# Patient Record
Sex: FEMALE | Race: WHITE | NOT HISPANIC OR LATINO | Employment: UNEMPLOYED | URBAN - METROPOLITAN AREA
[De-identification: names, ages, dates, MRNs, and addresses within clinical notes are randomized per-mention and may not be internally consistent; named-entity substitution may affect disease eponyms.]

---

## 2018-01-15 ENCOUNTER — OFFICE VISIT (OUTPATIENT)
Dept: URGENT CARE | Facility: MEDICAL CENTER | Age: 28
End: 2018-01-15
Payer: COMMERCIAL

## 2018-01-15 PROCEDURE — 99203 OFFICE O/P NEW LOW 30 MIN: CPT

## 2018-01-15 PROCEDURE — 96372 THER/PROPH/DIAG INJ SC/IM: CPT

## 2018-01-16 NOTE — PROGRESS NOTES
Assessment   1  Tension-type headache, not intractable, unspecified chronicity pattern (339 10) (G44 209)    Plan   Tension-type headache, not intractable, unspecified chronicity pattern    · Cyclobenzaprine HCl - 10 MG Oral Tablet; TAKE 1 TABLET AT BEDTIME AS    NEEDED   · Ketorolac Tromethamine 30 MG/ML Injection Solution    Discussion/Summary   Discussion Summary: You were given a Toradol injection 60 mg IM in the office today Flexeril 1/2 to 1 tablet at bedtime as needed  versus heat to the neck area as needed  Mucinex over-the-counter for ear cold symptoms  with Tylenol versus Motrin for headache control as needed  up with your family doctor symptoms persist or worsen  Medication Side Effects Reviewed: Possible side effects of new medications were reviewed with the patient/guardian today  Understands and agrees with treatment plan: The treatment plan was reviewed with the patient/guardian  The patient/guardian understands and agrees with the treatment plan      Chief Complaint   1  Headache  Chief Complaint Free Text Note Form: c/o occipital pain/headache (occurred spontaneously) x 1 week  History of Present Illness   HPI: Patient presents today with complaints of a posterior occipital headache  She states symptom on for approximately a week  She has been using Aleve Advil Tylenol with only slight relief of her symptoms  She denies any head trauma  She denies any associated nausea or vomiting  She also has some slight cold symptoms with nasal congestion  She denies any fever or chills  She rates her pain as 6 to 7/10 on the pain scale  She denies any history of migraines  Denies any radiating pain  patient also complains of cold symptoms with slight nasal discharge  She denies any significant cough fever chills    Hospital Based Practices Required Assessment:      Pain Assessment      the patient states they have pain  The pain is located in the headache   The patient describes the pain as comes and goes  (on a scale of 0 to 10, the patient rates the pain at 8 )      Abuse And Domestic Violence Screen       Yes, the patient is safe at home  -- The patient states no one is hurting them  Depression And Suicide Screen  No, the patient has not had thoughts of hurting themself  No, the patient has not felt depressed in the past 7 days  Review of Systems   Focused-Female:      Constitutional: No fever, no chills, feels well, no tiredness, no recent weight gain or loss  ENT: nasal discharge  Cardiovascular: no complaints of slow or fast heart rate, no chest pain, no palpitations, no leg claudication or lower extremity edema  Respiratory: no complaints of shortness of breath, no wheezing, no dyspnea on exertion, no orthopnea or PND  Neurological: headache, but-- no numbness-- and-- no dizziness  Active Problems   1  Acute bronchitis (466 0) (J20 9)   2  Acute upper respiratory infection (465 9) (J06 9)   3  Breech presentation (652 20) (O32 1XX0)   4  Costochondritis (733 6) (M94 0)   5  Encounter for fetal anatomic survey (V28 81) (Z36 89)   6  Encounter for observation of suspected problem with fetal growth not found (V89 04)     (Z03 74)   7  Episodic tension-type headache (339 11) (G44 219)   8  Intrauterine growth restriction affecting antepartum care of mother (656 53) (O36 5990)   9  Pregnancy (V22 2) (Z34 90)   10  Pregnancy resulting from in vitro fertilization (V23 85) (O09 819)   11  Stye, right (373 11) (H00 013)    Past Medical History   1  History of acute sinusitis (V12 69) (Z87 09)   2  History of acute sinusitis (V12 69) (Z87 09)  Active Problems And Past Medical History Reviewed: The active problems and past medical history were reviewed and updated today  Family History   Mother    1  No pertinent family history  Father    2  No pertinent family history  Family History Reviewed: The family history was reviewed and updated today         Social History    · Never A Smoker  Social History Reviewed: The social history was reviewed and updated today  Surgical History   Surgical History Reviewed: The surgical history was reviewed and updated today  Current Meds   Medication List Reviewed: The medication list was reviewed and updated today  Allergies   1  No Known Drug Allergies  2  Seasonal    Vitals   Signs   Recorded: [id number] 02:36PM   Temperature: 99 4 F  Heart Rate: 98  Respiration: 18  Systolic: 145  Diastolic: 81  BP Cuff Size: Large  Height: 5 ft 8 in  Weight: 185 lb   BMI Calculated: 28 13  BSA Calculated: 1 98  O2 Saturation: 98  Pain Scale: 8    Physical Exam        Constitutional      General appearance: No acute distress, well appearing and well nourished  Eyes      Conjunctiva and lids: No swelling, erythema or discharge  Pupils and irises: Equal, round and reactive to light  Ears, Nose, Mouth, and Throat      External inspection of ears and nose: Normal        Otoscopic examination: Tympanic membranes translucent with normal light reflex  Canals patent without erythema  Nasal mucosa, septum, and turbinates: Abnormal  -- mild nasal congestion  Pulmonary      Respiratory effort: No increased work of breathing or signs of respiratory distress  Auscultation of lungs: Clear to auscultation  Cardiovascular      Palpation of heart: Normal PMI, no thrills  Auscultation of heart: Normal rate and rhythm, normal S1 and S2, without murmurs  Musculoskeletal      Inspection/palpation of joints, bones, and muscles: Abnormal  -- Tenderness along the posterior occipital muscles  Full range of motion of the cervical spine  No palpable deformity  Neurologic      Cranial nerves: Cranial nerves 2-12 intact  Reflexes: 2+ and symmetric         Signatures    Electronically signed by : Cosme Mcconnell DO; Bud 15 2018  3:09PM EST                       (Author)

## 2018-01-23 VITALS
HEIGHT: 68 IN | RESPIRATION RATE: 18 BRPM | OXYGEN SATURATION: 98 % | BODY MASS INDEX: 28.04 KG/M2 | SYSTOLIC BLOOD PRESSURE: 110 MMHG | TEMPERATURE: 99.4 F | DIASTOLIC BLOOD PRESSURE: 81 MMHG | HEART RATE: 98 BPM | WEIGHT: 185 LBS

## 2018-03-06 ENCOUNTER — OFFICE VISIT (OUTPATIENT)
Dept: FAMILY MEDICINE CLINIC | Facility: CLINIC | Age: 28
End: 2018-03-06
Payer: COMMERCIAL

## 2018-03-06 VITALS
DIASTOLIC BLOOD PRESSURE: 78 MMHG | BODY MASS INDEX: 29.19 KG/M2 | WEIGHT: 192 LBS | SYSTOLIC BLOOD PRESSURE: 122 MMHG | TEMPERATURE: 98.1 F

## 2018-03-06 DIAGNOSIS — L20.9 ATOPIC DERMATITIS, UNSPECIFIED TYPE: ICD-10-CM

## 2018-03-06 DIAGNOSIS — J01.00 ACUTE NON-RECURRENT MAXILLARY SINUSITIS: Primary | ICD-10-CM

## 2018-03-06 PROCEDURE — 99213 OFFICE O/P EST LOW 20 MIN: CPT | Performed by: FAMILY MEDICINE

## 2018-03-06 RX ORDER — AZITHROMYCIN 250 MG/1
TABLET, FILM COATED ORAL
Qty: 6 TABLET | Refills: 0 | Status: SHIPPED | OUTPATIENT
Start: 2018-03-06 | End: 2018-03-13

## 2018-03-06 RX ORDER — MOMETASONE FUROATE 1 MG/G
CREAM TOPICAL DAILY
Qty: 45 G | Refills: 0 | Status: SHIPPED | OUTPATIENT
Start: 2018-03-06 | End: 2018-07-10 | Stop reason: ALTCHOICE

## 2018-03-07 NOTE — PROGRESS NOTES
Assessment/Plan:  Side effect profile medication discussed  Recommend return to office for re-evaluation if no improvement or worsening symptoms  No problem-specific Assessment & Plan notes found for this encounter  Diagnoses and all orders for this visit:    Acute non-recurrent maxillary sinusitis  -     azithromycin (ZITHROMAX) 250 mg tablet; Take 2 tablets today then 1 tablet daily x 4 days    Atopic dermatitis, unspecified type  -     mometasone (ELOCON) 0 1 % cream; Apply topically daily          Subjective:      Patient ID: Joanna Garrett is a 32 y o  female  Patient with sinus pressure and congestion over the last 1-2 weeks  She has been using over-the-counter decongestants with limited improvement  Occasional dry cough  No fever sweats or chills  Denies any GI complaints  Sore Throat    Associated symptoms include coughing and ear pain  Cough   Associated symptoms include ear pain and a sore throat  Earache    Associated symptoms include coughing and a sore throat  The following portions of the patient's history were reviewed and updated as appropriate: allergies, current medications, past family history, past medical history, past social history, past surgical history and problem list     Review of Systems   Constitutional: Negative  HENT: Positive for ear pain and sore throat  Eyes: Negative  Respiratory: Positive for cough  Cardiovascular: Negative  Gastrointestinal: Negative  Endocrine: Negative  Genitourinary: Negative  Musculoskeletal: Negative  Skin: Negative  Allergic/Immunologic: Negative  Neurological: Negative  Hematological: Negative  Psychiatric/Behavioral: Negative  Objective:      /78   Temp 98 1 °F (36 7 °C)   Wt 87 1 kg (192 lb)   BMI 29 19 kg/m²          Physical Exam   Constitutional: She is oriented to person, place, and time  She appears well-developed and well-nourished     HENT:   Head: Normocephalic and atraumatic  Right Ear: External ear normal  Tympanic membrane is not erythematous and not bulging  Left Ear: External ear normal  Tympanic membrane is not erythematous and not bulging  Nose: Nose normal    Mouth/Throat: Oropharynx is clear and moist and mucous membranes are normal  No oral lesions  No oropharyngeal exudate  Eyes: Conjunctivae and EOM are normal  Right eye exhibits no discharge  Left eye exhibits no discharge  No scleral icterus  Neck: Normal range of motion  Neck supple  No thyromegaly present  Cardiovascular: Normal rate, regular rhythm and normal heart sounds  Exam reveals no gallop and no friction rub  No murmur heard  Pulmonary/Chest: Effort normal  No respiratory distress  She has no wheezes  She has no rales  She exhibits no tenderness  Abdominal: Soft  Bowel sounds are normal  She exhibits no distension and no mass  There is no tenderness  There is no rebound and no guarding  Musculoskeletal: Normal range of motion  She exhibits no edema, tenderness or deformity  Lymphadenopathy:     She has no cervical adenopathy  Neurological: She is alert and oriented to person, place, and time  She has normal reflexes  No cranial nerve deficit  She exhibits normal muscle tone  Coordination normal    Skin: Skin is warm and dry  No rash noted  No erythema  No pallor  Psychiatric: She has a normal mood and affect  Her behavior is normal    Vitals reviewed

## 2018-05-07 ENCOUNTER — OFFICE VISIT (OUTPATIENT)
Dept: FAMILY MEDICINE CLINIC | Facility: CLINIC | Age: 28
End: 2018-05-07
Payer: COMMERCIAL

## 2018-05-07 VITALS
SYSTOLIC BLOOD PRESSURE: 110 MMHG | DIASTOLIC BLOOD PRESSURE: 70 MMHG | TEMPERATURE: 98.4 F | HEIGHT: 69 IN | OXYGEN SATURATION: 98 % | HEART RATE: 107 BPM | BODY MASS INDEX: 27.99 KG/M2 | WEIGHT: 189 LBS

## 2018-05-07 DIAGNOSIS — S39.012A STRAIN OF LUMBAR REGION, INITIAL ENCOUNTER: Primary | ICD-10-CM

## 2018-05-07 PROCEDURE — 99213 OFFICE O/P EST LOW 20 MIN: CPT | Performed by: FAMILY MEDICINE

## 2018-05-07 RX ORDER — MELOXICAM 15 MG/1
15 TABLET ORAL DAILY
Qty: 30 TABLET | Refills: 0 | Status: SHIPPED | OUTPATIENT
Start: 2018-05-07 | End: 2018-07-10 | Stop reason: ALTCHOICE

## 2018-05-07 NOTE — PROGRESS NOTES
Assessment/Plan:  No significant findings on physical exam today  Recommend physical therapy  Side effect profile medication reviewed  Return to office for recheck in 1 year  Sooner if needed  No problem-specific Assessment & Plan notes found for this encounter  Diagnoses and all orders for this visit:    Strain of lumbar region, initial encounter  -     Ambulatory referral to Physical Therapy; Future  -     meloxicam (MOBIC) 15 mg tablet; Take 1 tablet (15 mg total) by mouth daily          Subjective:      Patient ID: Alton Rivera is a 32 y o  female  Patient with bilateral SI joint pain over the last 1-2 weeks  Denies any trauma  No radiculopathy symptoms  No bowel or bladder incontinence symptoms  No dysuria  The following portions of the patient's history were reviewed and updated as appropriate: allergies, current medications, past family history, past medical history, past social history, past surgical history and problem list     Review of Systems   Constitutional: Negative  HENT: Negative  Eyes: Negative  Respiratory: Negative  Cardiovascular: Negative  Gastrointestinal: Negative  Endocrine: Negative  Genitourinary: Negative  Musculoskeletal: Positive for back pain  Skin: Negative  Allergic/Immunologic: Negative  Neurological: Negative  Hematological: Negative  Psychiatric/Behavioral: Negative  Objective:      /70 (BP Location: Left arm, Patient Position: Sitting, Cuff Size: Adult)   Pulse (!) 107   Temp 98 4 °F (36 9 °C)   Ht 5' 8 5" (1 74 m)   Wt 85 7 kg (189 lb)   SpO2 98%   BMI 28 32 kg/m²          Physical Exam   Constitutional: She is oriented to person, place, and time  She appears well-developed and well-nourished  HENT:   Head: Normocephalic and atraumatic  Right Ear: External ear normal  Tympanic membrane is not erythematous and not bulging     Left Ear: External ear normal  Tympanic membrane is not erythematous and not bulging  Nose: Nose normal    Mouth/Throat: Oropharynx is clear and moist and mucous membranes are normal  No oral lesions  No oropharyngeal exudate  Eyes: Conjunctivae and EOM are normal  Right eye exhibits no discharge  Left eye exhibits no discharge  No scleral icterus  Neck: Normal range of motion  Neck supple  No thyromegaly present  Cardiovascular: Normal rate, regular rhythm and normal heart sounds  Exam reveals no gallop and no friction rub  No murmur heard  Pulmonary/Chest: Effort normal  No respiratory distress  She has no wheezes  She has no rales  She exhibits no tenderness  Abdominal: Soft  Bowel sounds are normal  She exhibits no distension and no mass  There is no tenderness  There is no rebound and no guarding  Musculoskeletal: Normal range of motion  She exhibits no edema, tenderness or deformity  Lymphadenopathy:     She has no cervical adenopathy  Neurological: She is alert and oriented to person, place, and time  She has normal reflexes  No cranial nerve deficit  She exhibits normal muscle tone  Coordination normal    Skin: Skin is warm and dry  No rash noted  No erythema  No pallor  Psychiatric: She has a normal mood and affect  Her behavior is normal    Vitals reviewed

## 2018-05-07 NOTE — PROGRESS NOTES
Assessment/Plan:    No problem-specific Assessment & Plan notes found for this encounter  {Assess/PlanSmartLinks:02402}      Subjective:      Patient ID: Manas Hull is a 32 y o  female      HPI    {Common ambulatory SmartLinks:56832}    Review of Systems      Objective:      /70 (BP Location: Left arm, Patient Position: Sitting, Cuff Size: Adult)   Pulse (!) 107   Temp 98 4 °F (36 9 °C)   Ht 5' 8 5" (1 74 m)   Wt 85 7 kg (189 lb)   SpO2 98%   BMI 28 32 kg/m²          Physical Exam

## 2018-05-14 ENCOUNTER — EVALUATION (OUTPATIENT)
Dept: PHYSICAL THERAPY | Facility: CLINIC | Age: 28
End: 2018-05-14
Payer: COMMERCIAL

## 2018-05-14 DIAGNOSIS — S39.012S LUMBAR STRAIN, SEQUELA: Primary | ICD-10-CM

## 2018-05-14 PROCEDURE — G8991 OTHER PT/OT GOAL STATUS: HCPCS | Performed by: PHYSICAL THERAPIST

## 2018-05-14 PROCEDURE — 97161 PT EVAL LOW COMPLEX 20 MIN: CPT | Performed by: PHYSICAL THERAPIST

## 2018-05-14 PROCEDURE — G8990 OTHER PT/OT CURRENT STATUS: HCPCS | Performed by: PHYSICAL THERAPIST

## 2018-05-14 NOTE — PROGRESS NOTES
PT Evaluation     Today's date: 2018  Patient name: Katy Emerson  : 1990  MRN: [id number]  Referring provider: Kevin Ley DO  Dx:   Encounter Diagnosis     ICD-10-CM    1  Lumbar strain, sequela S39 012S                   Assessment  Impairments: abnormal muscle firing, activity intolerance, impaired physical strength, lacks appropriate home exercise program and pain with function    Assessment details: Pt is a 32year old female presenting to PT with 3 month history of central lumbar pain  Pt presents with deficits in posture, bilateral hip strength, and core stabilization/strength  Pt with mild right rotation at L4-L5 and sacrum which appears to be contributing to inhibition of multifidus activation  Pt is currently limited in her ability to perform dynamic and functional activities at this time secondary to pain and weakness  Pt is a good candidate for skilled PT intervention and will benefit from treatment in order to address their limitations and to restore maximal function  Understanding of Dx/Px/POC: good   Prognosis: good    Goals  Short Term Goals: To be achieved by Discharge    1) FOTO equal to or greater than 71   2) Patient to be independent with comprehensive HEP  3) Decrease pain to 4/10 at worst  for improved quality of life  4) Increase strength to 5/5 MMT grade in all deficient planes to improve a/iadls  Plan  Patient would benefit from: skilled PT  Planned therapy interventions: therapeutic exercise, strengthening, neuromuscular re-education and manual therapy  Frequency: 1x week  Duration in visits: 4  Duration in weeks: 4  Treatment plan discussed with: patient        Subjective Evaluation    History of Present Illness  Onset date: several months  Mechanism of injury: Pt is a 32year old female presenting to PT with 3 month history of central lumbar pain  Pt denies an injury, trauma or preceding event   She states the only activity that has changed was that she moved into a new home in March and was lifting boxes and moving furniture  She noted gradual onset and increase in her pain beginning in March  Pt reports the pain is intermittent in nature  Pt denies numbness/tingling and bowel/bladder changes  She went to PCP last week for further evaluation - no imaging performed - pt prescribed Meloxicam, however, states she hasn't taken it yet  Pt referred to OPPT  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  Pt reports increased pain and/or difficulty with: bending, heavy lifting, rotating, after a strenuous day  Pt denies sleep disturbance secondary to pain  Pt reports decreased pain with: Advil, rest, changing position  Not a recurrent problem   Quality of life: good    Pain  Current pain ratin  At best pain ratin  At worst pain ratin  Quality: pressure, dull ache and burning      Diagnostic Tests  No diagnostic tests performed  Patient Goals  Patient goals for therapy: decreased pain, increased motion, return to sport/leisure activities, independence with ADLs/IADLs and increased strength          Objective     Postural Observations    Additional Postural Observation Details  Pt with mild forward head, rounded shoulders and increased lumbar lordosis  Tenderness     Lumbar Spine  Tenderness in the spinous process, facet joint, left transverse process and right transverse process       Additional Tenderness Details  TTP and mild right rotation of  L4-L5 and right sacrum region    Neurological Testing     Sensation     Lumbar   Left   Intact: light touch    Right   Intact: light touch    Active Range of Motion     Lumbar   Flexion: WFL and with pain  Extension: WFL and with pain  Left lateral flexion: WFL  Right lateral flexion: WFL  Left rotation: WFL  Right rotation: [facility]    Strength/Myotome Testing     Left Hip   Planes of Motion   Flexion: 4+  Extension: 4+  Abduction: 4+  Adduction: 4+  External rotation: 4  Internal rotation: 4    Right Hip   Planes of Motion Flexion: 4+  Extension: 4+  Abduction: 5  Adduction: 5  External rotation: 4+  Internal rotation: 4+    Left Knee   Flexion: 5  Extension: 5    Right Knee   Flexion: 5  Extension: 5    Left Ankle/Foot   Dorsiflexion: 5  Plantar flexion: 5  Eversion: 5    Right Ankle/Foot   Dorsiflexion: 5  Plantar flexion: 5  Inversion: 5  Eversion: 5    Muscle Activation   Patient able to activate left multifidus and right multifidus  Additional Muscle Activation Details  Core Strength/Stabilization: Fair -    Tests       Thoracic   Negative slump  Lumbar   Negative slump  Left   Negative passive SLR  Left Pelvic Girdle/Sacrum   Negative: sacrum compression and gapping  Right Pelvic Girdle/Sacrum   Negative: sacrum compression and gapping  Left Hip   Negative Gillet's  Right Hip   Negative Gillet's       Additional Tests Details  Neural tension and SIJ testing negative       Precautions: none    Daily Treatment Diary       Manuals 5/14            L4-L5 and sacrum  R rotation correction MD            Psoas STM                                       Exercise Diary              PPT             PPT w/iso ADD             PPT w/iso hip flexion             PPT w/low bridge             Clamshells             PPT + reach             Side planks: knees bent             Front planks: knees bent             Prone over pillows iso hip extension             SLR ABD + EXT w/iso hold                                                                                                                                                                         Modalities             MHP L-spine 90/90  Pre-tx

## 2018-05-22 ENCOUNTER — OFFICE VISIT (OUTPATIENT)
Dept: PHYSICAL THERAPY | Facility: CLINIC | Age: 28
End: 2018-05-22
Payer: COMMERCIAL

## 2018-05-22 DIAGNOSIS — S39.012S LUMBAR STRAIN, SEQUELA: Primary | ICD-10-CM

## 2018-05-22 PROCEDURE — 97140 MANUAL THERAPY 1/> REGIONS: CPT | Performed by: PHYSICAL THERAPIST

## 2018-05-22 PROCEDURE — 97110 THERAPEUTIC EXERCISES: CPT | Performed by: PHYSICAL THERAPIST

## 2018-05-22 NOTE — PROGRESS NOTES
Daily Note     Today's date: 2018  Patient name: Vesta Chavez  : 1990  MRN: [id number]  Referring provider: Freya Dunn DO  Dx:   Encounter Diagnosis     ICD-10-CM    1  Lumbar strain, sequela S39 012S                   Subjective: Pt reports 1/10 central lumbar "tightness" prior to session  She notes mild soreness with performance of HEP; states "I may be pushing too much with my low back "       Objective: See treatment diary below      Manuals            L4-L5 and sacrum  R rotation correction MD 10'           Psoas STM  5'                                     Exercise Diary              PPT HEP 10"x10           PPT w/iso ADD HEP 10"x10           PPT w/iso hip flexion  10"x10           PPT w/low bridge HEP 10"x10           Clamshells w/ TB  b/l No TB  HEP Green  10"x10             PPT + reach  10"x10           Side planks: knees bent  NV           Front planks: knees bent  NV           Prone over pillows iso hip extension             SLR ABD + EXT w/iso hold             Modalities             MHP L-spine 90/90  Pre-tx 10'  10'                              Assessment: Pt demonstrated good tolerance to progression of dynamic stabilization/strengthening program reporting no increase in pain  She required minimal verbal and manual cues to ensure proper performance of all DLS exercises  She noted mild fatigue with completion of session  Pt will benefit from continued skilled PT intervention in order to address their remaining limitations and to restore maximal function  Plan: Continue per plan of care  Progress treatment as tolerated

## 2018-05-29 ENCOUNTER — OFFICE VISIT (OUTPATIENT)
Dept: PHYSICAL THERAPY | Facility: CLINIC | Age: 28
End: 2018-05-29
Payer: COMMERCIAL

## 2018-05-29 DIAGNOSIS — S39.012S LUMBAR STRAIN, SEQUELA: Primary | ICD-10-CM

## 2018-05-29 PROCEDURE — 97110 THERAPEUTIC EXERCISES: CPT | Performed by: PHYSICAL THERAPIST

## 2018-05-29 PROCEDURE — 97140 MANUAL THERAPY 1/> REGIONS: CPT | Performed by: PHYSICAL THERAPIST

## 2018-05-29 NOTE — PROGRESS NOTES
Daily Note     Today's date: 2018  Patient name: Jose Vang  : 1990  MRN: [id number]  Referring provider: Carolyn Shea DO  Dx:   Encounter Diagnosis     ICD-10-CM    1  Lumbar strain, sequela S39 012S                   Subjective: Pt reports "I feel pretty good, barely any pain " She rates her pain at 0-1/10 prior to session  She reports compliance with HEP, and is without questions/concerns  Objective: See treatment diary below      Manuals           L4-L5 and sacrum  R rotation correction MD 10' '          Psoas STM-  b/l  5' 10                                    Exercise Diary              PPT HEP 10"x10 10"x10          PPT w/iso ADD HEP 10"x10 10"x10          PPT w/iso hip flexion  10"x10 10"x10          PPT w/low bridge HEP 10"x10 10"x10          Clamshells w/ TB  b/l No TB  HEP Green  10"x10   Blue  10"x10          PPT + reach  10"x10 5"x20          Side planks: knees bent  NV 10"x5  b/l          Front planks: knees bent  NV 10"x5          Prone over pillows iso hip extension             SLR ABD + EXT w/iso hold             Modalities             MHP L-spine 90/90  Pre-tx 10'  10'                              Assessment: Pt demonstrated good overall knowledge, tolerance and performance with current program as well as progressions of dynamic stabilization/strengthening program reporting no increase in pain  She required minimal verbal and manual cues to ensure proper performance of planks  Pt with minimal psoas myofascial restriction and sacral rotation today, both improved following manual techniques  Plan: Pt requests transition to  HEP at this time and is without questions/concerns  Pt advised to contact PT staff with questions/concerns  Updated HEP issued

## 2018-07-02 ENCOUNTER — OFFICE VISIT (OUTPATIENT)
Dept: URGENT CARE | Facility: MEDICAL CENTER | Age: 28
End: 2018-07-02
Payer: COMMERCIAL

## 2018-07-02 ENCOUNTER — APPOINTMENT (OUTPATIENT)
Dept: RADIOLOGY | Facility: MEDICAL CENTER | Age: 28
End: 2018-07-02
Payer: COMMERCIAL

## 2018-07-02 VITALS
OXYGEN SATURATION: 99 % | RESPIRATION RATE: 16 BRPM | HEIGHT: 69 IN | WEIGHT: 189 LBS | DIASTOLIC BLOOD PRESSURE: 64 MMHG | TEMPERATURE: 98.7 F | HEART RATE: 85 BPM | BODY MASS INDEX: 27.99 KG/M2 | SYSTOLIC BLOOD PRESSURE: 108 MMHG

## 2018-07-02 DIAGNOSIS — R10.9 ACUTE RIGHT FLANK PAIN: Primary | ICD-10-CM

## 2018-07-02 DIAGNOSIS — R30.0 DYSURIA: ICD-10-CM

## 2018-07-02 DIAGNOSIS — R10.9 ACUTE RIGHT FLANK PAIN: ICD-10-CM

## 2018-07-02 LAB
SL AMB  POCT GLUCOSE, UA: ABNORMAL
SL AMB LEUKOCYTE ESTERASE,UA: ABNORMAL
SL AMB POCT BILIRUBIN,UA: ABNORMAL
SL AMB POCT BLOOD,UA: ABNORMAL
SL AMB POCT CLARITY,UA: ABNORMAL
SL AMB POCT COLOR,UA: ABNORMAL
SL AMB POCT KETONES,UA: ABNORMAL
SL AMB POCT NITRITE,UA: ABNORMAL
SL AMB POCT PH,UA: 6
SL AMB POCT SPECIFIC GRAVITY,UA: 1.01
SL AMB POCT URINE PROTEIN: ABNORMAL
SL AMB POCT UROBILINOGEN: 0.2

## 2018-07-02 PROCEDURE — 81002 URINALYSIS NONAUTO W/O SCOPE: CPT | Performed by: NURSE PRACTITIONER

## 2018-07-02 PROCEDURE — 99213 OFFICE O/P EST LOW 20 MIN: CPT | Performed by: NURSE PRACTITIONER

## 2018-07-02 PROCEDURE — 74018 RADEX ABDOMEN 1 VIEW: CPT

## 2018-07-02 NOTE — PATIENT INSTRUCTIONS
Flank Pain, Ambulatory Care   GENERAL INFORMATION:   Flank pain  is felt in the area below your ribcage and above your hip bones, often in the lower back  Your pain may be dull or so severe that you cannot get comfortable  The pain may stay in one area or radiate to another area  It may worsen and lighten in waves  Flank pain is often a sign of problems with your urinary tract, such as a kidney stone or infection  Seek immediate care for the following symptoms:   · Fever    · Fluttering or jumping heart    · Blood in your urine    · Pain that radiates into your lower abdomen and genital area    · Severe pain in your low back next to your spine    · Fatigue and no desire to eat    · A headache and muscle spasms  Treatment for flank pain  may include medicine to decrease pain or treat a bacterial infection  Follow up with your healthcare provider as directed:  Write down your questions so you remember to ask them during your visits  CARE AGREEMENT:   You have the right to help plan your care  Learn about your health condition and how it may be treated  Discuss treatment options with your caregivers to decide what care you want to receive  You always have the right to refuse treatment  The above information is an  only  It is not intended as medical advice for individual conditions or treatments  Talk to your doctor, nurse or pharmacist before following any medical regimen to see if it is safe and effective for you  © 2014 [address] is for End User's use only and may not be sold, redistributed or otherwise used for commercial purposes  All illustrations and images included in CareNotes® are the copyrighted property of [employer]  or Karan [employer]

## 2018-07-02 NOTE — PROGRESS NOTES
[address] [facility]        NAME: Jose Vang is a 25 y o  female  : 1990    MRN: [id number]  DATE: 2018  TIME: 2:14 PM    Assessment and Plan   Acute right flank pain [R10 9]  1  Acute right flank pain  XR abdomen 1 view kub   2  Dysuria  POCT urine dip         Patient Instructions       Follow up with PCP in 3-5 days  Proceed to  ER if symptoms worsen  Chief Complaint     Chief Complaint   Patient presents with    Flank Pain     started last pm; Right-sided; urine dark         History of Present Illness       17-year-old female presenting today with c/o right flank pain starting yesterday, improving today  However, today she started with dark colored urine  No f/c  No dysuria, increased urinary frequency, or increased urinary urgency  No personal history of kidney stones, but + family history  Father's stones examined were calcium  She has been taking ibuprofen for discomfort which has been effective  LMP 6 15 18  Flank Pain   Pertinent negatives include no abdominal pain, chest pain, dysuria, fever or pelvic pain  Review of Systems   Review of Systems   Constitutional: Negative for chills, fatigue and fever  Respiratory: Negative for shortness of breath  Cardiovascular: Negative for chest pain  Gastrointestinal: Negative for abdominal pain, constipation, diarrhea and nausea  Genitourinary: Positive for flank pain and hematuria  Negative for dysuria, frequency, pelvic pain, urgency and vaginal bleeding  Neurological: Negative for dizziness           Current Medications       Current Outpatient Prescriptions:     meloxicam (MOBIC) 15 mg tablet, Take 1 tablet (15 mg total) by mouth daily, Disp: 30 tablet, Rfl: 0    mometasone (ELOCON) 0 1 % cream, Apply topically daily, Disp: 45 g, Rfl: 0    Current Allergies     Allergies as of 2018 - Reviewed 2018   Allergen Reaction Noted    Pollen extract  2015            The following portions of the patient's history were reviewed and updated as appropriate: allergies, current medications, past family history, past medical history, past social history, past surgical history and problem list      No past medical history on file  No past surgical history on file  No family history on file  Medications have been verified  Objective   /64   Pulse 85   Temp 98 7 °F (37 1 °C)   Resp 16   Ht 5' 9" (1 753 m)   Wt 85 7 kg (189 lb)   SpO2 99%   BMI 27 91 kg/m²        Physical Exam     Physical Exam   Constitutional: She is oriented to person, place, and time  She appears well-developed and well-nourished  Cardiovascular: Normal rate, regular rhythm and normal heart sounds  Pulmonary/Chest: Effort normal and breath sounds normal    Abdominal: Soft  Bowel sounds are normal  There is no tenderness  There is no CVA tenderness  Musculoskeletal: Normal range of motion  Neurological: She is alert and oriented to person, place, and time  Skin: Skin is warm and dry

## 2018-07-06 ENCOUNTER — OFFICE VISIT (OUTPATIENT)
Dept: FAMILY MEDICINE CLINIC | Facility: CLINIC | Age: 28
End: 2018-07-06
Payer: COMMERCIAL

## 2018-07-06 ENCOUNTER — HOSPITAL ENCOUNTER (OUTPATIENT)
Dept: CT IMAGING | Facility: HOSPITAL | Age: 28
Discharge: HOME | End: 2018-07-06
Payer: COMMERCIAL

## 2018-07-06 VITALS
HEART RATE: 80 BPM | TEMPERATURE: 98.6 F | DIASTOLIC BLOOD PRESSURE: 80 MMHG | WEIGHT: 192 LBS | SYSTOLIC BLOOD PRESSURE: 120 MMHG | HEIGHT: 68 IN | BODY MASS INDEX: 29.1 KG/M2

## 2018-07-06 DIAGNOSIS — R31.9 HEMATURIA, UNSPECIFIED TYPE: Primary | ICD-10-CM

## 2018-07-06 DIAGNOSIS — R31.9 HEMATURIA, UNSPECIFIED TYPE: ICD-10-CM

## 2018-07-06 PROBLEM — Z87.42 HISTORY OF FEMALE INFERTILITY: Status: ACTIVE | Noted: 2018-07-06

## 2018-07-06 LAB
SL AMB  POCT GLUCOSE, UA: NORMAL
SL AMB LEUKOCYTE ESTERASE,UA: ABNORMAL
SL AMB POCT BILIRUBIN,UA: NEGATIVE
SL AMB POCT BLOOD,UA: 250
SL AMB POCT CLARITY,UA: ABNORMAL
SL AMB POCT COLOR,UA: YELLOW
SL AMB POCT KETONES,UA: NEGATIVE
SL AMB POCT NITRITE,UA: NEGATIVE
SL AMB POCT PH,UA: 5
SL AMB POCT SPECIFIC GRAVITY,UA: 1.01
SL AMB POCT URINE PROTEIN: NEGATIVE
SL AMB POCT UROBILINOGEN: NORMAL

## 2018-07-06 PROCEDURE — 87086 URINE CULTURE/COLONY COUNT: CPT | Performed by: FAMILY MEDICINE

## 2018-07-06 PROCEDURE — 74176 CT ABD & PELVIS W/O CONTRAST: CPT

## 2018-07-06 PROCEDURE — 81002 URINALYSIS NONAUTO W/O SCOPE: CPT | Performed by: FAMILY MEDICINE

## 2018-07-06 PROCEDURE — 99213 OFFICE O/P EST LOW 20 MIN: CPT | Performed by: FAMILY MEDICINE

## 2018-07-06 PROCEDURE — 3008F BODY MASS INDEX DOCD: CPT | Performed by: FAMILY MEDICINE

## 2018-07-06 NOTE — PROGRESS NOTES
Assessment/Plan:  No significant findings on physical exam today  Recommended urine culture and CT to rule out urinary calculi  Await results  ER evaluation if symptoms persist or worsen  No problem-specific Assessment & Plan notes found for this encounter  Diagnoses and all orders for this visit:    Hematuria, unspecified type  -     POCT urine dip  -     Urine culture; Future  -     CT renal stone study abdomen pelvis wo contrast; Future  -     CBC and differential  -     Comprehensive metabolic panel  -     Lipid Panel with Direct LDL reflex    Other orders  -     progesterone (ENDOMETRIN) 100 MG vaginal insert; progesterone          Subjective:      Patient ID: Guilherme Mancia is a 25 y o  female  Patient with recent history of gross hematuria over the last several days  She has a family history of kidney stones  She has some mild to moderate right flank pain with occasional radiation to the right groin  Onset 2-3 days ago  No bowel symptoms  Blood in Urine         The following portions of the patient's history were reviewed and updated as appropriate: allergies, current medications, past medical history, past social history, past surgical history and problem list     Review of Systems   Constitutional: Negative  HENT: Negative  Eyes: Negative  Respiratory: Negative  Cardiovascular: Negative  Gastrointestinal: Negative  Endocrine: Negative  Genitourinary: Positive for hematuria  Musculoskeletal: Negative  Skin: Negative  Allergic/Immunologic: Negative  Neurological: Negative  Hematological: Negative  Psychiatric/Behavioral: Negative  Objective:      /80 (BP Location: Left arm, Patient Position: Sitting, Cuff Size: Adult)   Pulse 80   Temp 98 6 °F (37 °C)   Ht 5' 8 11" (1 73 m)   Wt 87 1 kg (192 lb)   BMI 29 10 kg/m²          Physical Exam   Constitutional: She is oriented to person, place, and time   She appears well-developed and well-nourished  HENT:   Head: Normocephalic and atraumatic  Right Ear: External ear normal  Tympanic membrane is not erythematous and not bulging  Left Ear: External ear normal  Tympanic membrane is not erythematous and not bulging  Nose: Nose normal    Mouth/Throat: Oropharynx is clear and moist and mucous membranes are normal  No oral lesions  No oropharyngeal exudate  Eyes: Conjunctivae and EOM are normal  Right eye exhibits no discharge  Left eye exhibits no discharge  No scleral icterus  Neck: Normal range of motion  Neck supple  No thyromegaly present  Cardiovascular: Normal rate, regular rhythm and normal heart sounds  Exam reveals no gallop and no friction rub  No murmur heard  Pulmonary/Chest: Effort normal  No respiratory distress  She has no wheezes  She has no rales  She exhibits no tenderness  Abdominal: Soft  Bowel sounds are normal  She exhibits no distension and no mass  There is no tenderness  There is no rebound and no guarding  Musculoskeletal: Normal range of motion  She exhibits no edema, tenderness or deformity  Lymphadenopathy:     She has no cervical adenopathy  Neurological: She is alert and oriented to person, place, and time  She has normal reflexes  No cranial nerve deficit  She exhibits normal muscle tone  Coordination normal    Skin: Skin is warm and dry  No rash noted  No erythema  No pallor  Psychiatric: She has a normal mood and affect  Her behavior is normal    Vitals reviewed

## 2018-07-07 LAB — BACTERIA UR CULT: NORMAL

## 2018-07-09 ENCOUNTER — HOSPITAL ENCOUNTER (EMERGENCY)
Facility: HOSPITAL | Age: 28
Discharge: HOME | End: 2018-07-09
Attending: EMERGENCY MEDICINE | Admitting: EMERGENCY MEDICINE
Payer: COMMERCIAL

## 2018-07-09 VITALS
OXYGEN SATURATION: 99 % | RESPIRATION RATE: 18 BRPM | SYSTOLIC BLOOD PRESSURE: 136 MMHG | DIASTOLIC BLOOD PRESSURE: 76 MMHG | HEART RATE: 75 BPM | TEMPERATURE: 98 F

## 2018-07-09 DIAGNOSIS — R11.2 NAUSEA & VOMITING: ICD-10-CM

## 2018-07-09 DIAGNOSIS — N23 RENAL COLIC ON RIGHT SIDE: Primary | ICD-10-CM

## 2018-07-09 LAB
AMORPH URATE CRY URNS QL MICRO: ABNORMAL /HPF
BACTERIA UR QL AUTO: ABNORMAL /HPF
BILIRUB UR QL STRIP: NEGATIVE
CLARITY UR: CLEAR
COLOR UR: YELLOW
EXT PREG TEST URINE: NEGATIVE
GLUCOSE UR STRIP-MCNC: NEGATIVE MG/DL
HGB UR QL STRIP.AUTO: ABNORMAL
KETONES UR STRIP-MCNC: ABNORMAL MG/DL
LEUKOCYTE ESTERASE UR QL STRIP: NEGATIVE
NITRITE UR QL STRIP: NEGATIVE
NON-SQ EPI CELLS URNS QL MICRO: ABNORMAL /HPF
PH UR STRIP.AUTO: 7 [PH] (ref 4.5–8)
PROT UR STRIP-MCNC: ABNORMAL MG/DL
RBC #/AREA URNS AUTO: ABNORMAL /HPF
SP GR UR STRIP.AUTO: 1.01 (ref 1–1.03)
UROBILINOGEN UR QL STRIP.AUTO: 0.2 E.U./DL
WBC #/AREA URNS AUTO: ABNORMAL /HPF

## 2018-07-09 PROCEDURE — 99284 EMERGENCY DEPT VISIT MOD MDM: CPT

## 2018-07-09 PROCEDURE — 81025 URINE PREGNANCY TEST: CPT | Performed by: EMERGENCY MEDICINE

## 2018-07-09 PROCEDURE — 96372 THER/PROPH/DIAG INJ SC/IM: CPT

## 2018-07-09 PROCEDURE — 81001 URINALYSIS AUTO W/SCOPE: CPT

## 2018-07-09 RX ORDER — KETOROLAC TROMETHAMINE 30 MG/ML
30 INJECTION, SOLUTION INTRAMUSCULAR; INTRAVENOUS ONCE
Status: COMPLETED | OUTPATIENT
Start: 2018-07-09 | End: 2018-07-09

## 2018-07-09 RX ORDER — OXYCODONE HYDROCHLORIDE AND ACETAMINOPHEN 5; 325 MG/1; MG/1
1 TABLET ORAL EVERY 8 HOURS PRN
Qty: 10 TABLET | Refills: 0 | Status: SHIPPED | OUTPATIENT
Start: 2018-07-09 | End: 2018-07-14

## 2018-07-09 RX ORDER — IBUPROFEN 800 MG/1
800 TABLET ORAL EVERY 8 HOURS PRN
Qty: 30 TABLET | Refills: 0 | Status: SHIPPED | OUTPATIENT
Start: 2018-07-09 | End: 2018-07-10 | Stop reason: ALTCHOICE

## 2018-07-09 RX ORDER — ONDANSETRON 4 MG/1
4 TABLET, ORALLY DISINTEGRATING ORAL EVERY 8 HOURS PRN
Qty: 20 TABLET | Refills: 0 | Status: SHIPPED | OUTPATIENT
Start: 2018-07-09 | End: 2018-12-13

## 2018-07-09 RX ORDER — ONDANSETRON 4 MG/1
4 TABLET, ORALLY DISINTEGRATING ORAL ONCE
Status: COMPLETED | OUTPATIENT
Start: 2018-07-09 | End: 2018-07-09

## 2018-07-09 RX ADMIN — KETOROLAC TROMETHAMINE 30 MG: 30 INJECTION, SOLUTION INTRAMUSCULAR at 21:14

## 2018-07-09 RX ADMIN — ONDANSETRON 4 MG: 4 TABLET, ORALLY DISINTEGRATING ORAL at 21:14

## 2018-07-10 ENCOUNTER — ANESTHESIA EVENT (OUTPATIENT)
Dept: PERIOP | Facility: HOSPITAL | Age: 28
End: 2018-07-10
Payer: COMMERCIAL

## 2018-07-10 ENCOUNTER — HOSPITAL ENCOUNTER (OUTPATIENT)
Facility: HOSPITAL | Age: 28
Setting detail: OBSERVATION
Discharge: HOME | End: 2018-07-11
Attending: EMERGENCY MEDICINE | Admitting: INTERNAL MEDICINE
Payer: COMMERCIAL

## 2018-07-10 DIAGNOSIS — N23 URETERIC COLIC: ICD-10-CM

## 2018-07-10 DIAGNOSIS — N20.0 NEPHROLITHIASIS: ICD-10-CM

## 2018-07-10 DIAGNOSIS — R10.9 RIGHT FLANK PAIN: Primary | ICD-10-CM

## 2018-07-10 DIAGNOSIS — R10.9 FLANK PAIN: ICD-10-CM

## 2018-07-10 LAB
ALBUMIN SERPL BCP-MCNC: 4.4 G/DL (ref 3.5–5)
ALP SERPL-CCNC: 64 U/L (ref 46–116)
ALT SERPL W P-5'-P-CCNC: 36 U/L (ref 12–78)
ANION GAP SERPL CALCULATED.3IONS-SCNC: 9 MMOL/L (ref 4–13)
AST SERPL W P-5'-P-CCNC: 24 U/L (ref 5–45)
BACTERIA UR QL AUTO: ABNORMAL /HPF
BASOPHILS # BLD AUTO: 0.02 THOUSANDS/ΜL (ref 0–0.1)
BASOPHILS NFR BLD AUTO: 0 % (ref 0–1)
BILIRUB SERPL-MCNC: 0.43 MG/DL (ref 0.2–1)
BILIRUB UR QL STRIP: ABNORMAL
BUN SERPL-MCNC: 15 MG/DL (ref 5–25)
CALCIUM SERPL-MCNC: 9 MG/DL (ref 8.3–10.1)
CHLORIDE SERPL-SCNC: 101 MMOL/L (ref 100–108)
CLARITY UR: ABNORMAL
CO2 SERPL-SCNC: 27 MMOL/L (ref 21–32)
COLOR UR: YELLOW
CREAT SERPL-MCNC: 1.08 MG/DL (ref 0.6–1.3)
EOSINOPHIL # BLD AUTO: 0.01 THOUSAND/ΜL (ref 0–0.61)
EOSINOPHIL NFR BLD AUTO: 0 % (ref 0–6)
ERYTHROCYTE [DISTWIDTH] IN BLOOD BY AUTOMATED COUNT: 13.7 % (ref 11.6–15.1)
EXT PREG TEST URINE: NORMAL
GFR SERPL CREATININE-BSD FRML MDRD: 69 ML/MIN/1.73SQ M
GLUCOSE SERPL-MCNC: 101 MG/DL (ref 65–140)
GLUCOSE UR STRIP-MCNC: NEGATIVE MG/DL
HCT VFR BLD AUTO: 39.8 % (ref 34.8–46.1)
HGB BLD-MCNC: 12.8 G/DL (ref 11.5–15.4)
HGB UR QL STRIP.AUTO: ABNORMAL
KETONES UR STRIP-MCNC: ABNORMAL MG/DL
LEUKOCYTE ESTERASE UR QL STRIP: ABNORMAL
LYMPHOCYTES # BLD AUTO: 1.48 THOUSANDS/ΜL (ref 0.6–4.47)
LYMPHOCYTES NFR BLD AUTO: 14 % (ref 14–44)
MCH RBC QN AUTO: 28.9 PG (ref 26.8–34.3)
MCHC RBC AUTO-ENTMCNC: 32.2 G/DL (ref 31.4–37.4)
MCV RBC AUTO: 90 FL (ref 82–98)
MONOCYTES # BLD AUTO: 0.7 THOUSAND/ΜL (ref 0.17–1.22)
MONOCYTES NFR BLD AUTO: 6 % (ref 4–12)
MUCOUS THREADS UR QL AUTO: ABNORMAL
NEUTROPHILS # BLD AUTO: 8.71 THOUSANDS/ΜL (ref 1.85–7.62)
NEUTS SEG NFR BLD AUTO: 80 % (ref 43–75)
NITRITE UR QL STRIP: NEGATIVE
NON-SQ EPI CELLS URNS QL MICRO: ABNORMAL /HPF
NRBC BLD AUTO-RTO: 0 /100 WBCS
PH UR STRIP.AUTO: 7.5 [PH] (ref 4.5–8)
PLATELET # BLD AUTO: 206 THOUSANDS/UL (ref 149–390)
PMV BLD AUTO: 11.3 FL (ref 8.9–12.7)
POTASSIUM SERPL-SCNC: 3.7 MMOL/L (ref 3.5–5.3)
PROT SERPL-MCNC: 8.1 G/DL (ref 6.4–8.2)
PROT UR STRIP-MCNC: ABNORMAL MG/DL
RBC # BLD AUTO: 4.43 MILLION/UL (ref 3.81–5.12)
RBC #/AREA URNS AUTO: ABNORMAL /HPF
SODIUM SERPL-SCNC: 137 MMOL/L (ref 136–145)
SP GR UR STRIP.AUTO: 1.02 (ref 1–1.03)
UROBILINOGEN UR QL STRIP.AUTO: 0.2 E.U./DL
WBC # BLD AUTO: 10.92 THOUSAND/UL (ref 4.31–10.16)
WBC #/AREA URNS AUTO: ABNORMAL /HPF

## 2018-07-10 PROCEDURE — 99220 PR INITIAL OBSERVATION CARE/DAY 70 MINUTES: CPT | Performed by: INTERNAL MEDICINE

## 2018-07-10 PROCEDURE — 96361 HYDRATE IV INFUSION ADD-ON: CPT

## 2018-07-10 PROCEDURE — 99285 EMERGENCY DEPT VISIT HI MDM: CPT

## 2018-07-10 PROCEDURE — 80053 COMPREHEN METABOLIC PANEL: CPT | Performed by: EMERGENCY MEDICINE

## 2018-07-10 PROCEDURE — 96374 THER/PROPH/DIAG INJ IV PUSH: CPT

## 2018-07-10 PROCEDURE — 85025 COMPLETE CBC W/AUTO DIFF WBC: CPT | Performed by: EMERGENCY MEDICINE

## 2018-07-10 PROCEDURE — 99244 OFF/OP CNSLTJ NEW/EST MOD 40: CPT | Performed by: UROLOGY

## 2018-07-10 PROCEDURE — 81025 URINE PREGNANCY TEST: CPT | Performed by: EMERGENCY MEDICINE

## 2018-07-10 PROCEDURE — 96376 TX/PRO/DX INJ SAME DRUG ADON: CPT

## 2018-07-10 PROCEDURE — 36415 COLL VENOUS BLD VENIPUNCTURE: CPT | Performed by: EMERGENCY MEDICINE

## 2018-07-10 PROCEDURE — 81001 URINALYSIS AUTO W/SCOPE: CPT

## 2018-07-10 RX ORDER — ONDANSETRON 2 MG/ML
4 INJECTION INTRAMUSCULAR; INTRAVENOUS EVERY 6 HOURS PRN
Status: DISCONTINUED | OUTPATIENT
Start: 2018-07-10 | End: 2018-07-11 | Stop reason: HOSPADM

## 2018-07-10 RX ORDER — OXYCODONE HYDROCHLORIDE AND ACETAMINOPHEN 5; 325 MG/1; MG/1
1 TABLET ORAL EVERY 8 HOURS PRN
Status: DISCONTINUED | OUTPATIENT
Start: 2018-07-10 | End: 2018-07-10

## 2018-07-10 RX ORDER — OXYCODONE HYDROCHLORIDE AND ACETAMINOPHEN 5; 325 MG/1; MG/1
2 TABLET ORAL EVERY 6 HOURS PRN
Status: DISCONTINUED | OUTPATIENT
Start: 2018-07-10 | End: 2018-07-11 | Stop reason: HOSPADM

## 2018-07-10 RX ORDER — ACETAMINOPHEN 325 MG/1
650 TABLET ORAL EVERY 6 HOURS PRN
Status: DISCONTINUED | OUTPATIENT
Start: 2018-07-10 | End: 2018-07-11 | Stop reason: HOSPADM

## 2018-07-10 RX ORDER — TAMSULOSIN HYDROCHLORIDE 0.4 MG/1
0.4 CAPSULE ORAL ONCE
Status: COMPLETED | OUTPATIENT
Start: 2018-07-10 | End: 2018-07-10

## 2018-07-10 RX ORDER — SODIUM CHLORIDE 9 MG/ML
125 INJECTION, SOLUTION INTRAVENOUS CONTINUOUS
Status: DISCONTINUED | OUTPATIENT
Start: 2018-07-10 | End: 2018-07-10 | Stop reason: SDUPTHER

## 2018-07-10 RX ORDER — SODIUM CHLORIDE 9 MG/ML
125 INJECTION, SOLUTION INTRAVENOUS CONTINUOUS
Status: DISCONTINUED | OUTPATIENT
Start: 2018-07-10 | End: 2018-07-11 | Stop reason: HOSPADM

## 2018-07-10 RX ORDER — ONDANSETRON 4 MG/1
4 TABLET, ORALLY DISINTEGRATING ORAL EVERY 8 HOURS PRN
Status: DISCONTINUED | OUTPATIENT
Start: 2018-07-10 | End: 2018-07-10

## 2018-07-10 RX ADMIN — OXYCODONE HYDROCHLORIDE AND ACETAMINOPHEN 2 TABLET: 5; 325 TABLET ORAL at 14:12

## 2018-07-10 RX ADMIN — OXYCODONE HYDROCHLORIDE AND ACETAMINOPHEN 2 TABLET: 5; 325 TABLET ORAL at 20:24

## 2018-07-10 RX ADMIN — TAMSULOSIN HYDROCHLORIDE 0.4 MG: 0.4 CAPSULE ORAL at 13:14

## 2018-07-10 RX ADMIN — HYDROMORPHONE HYDROCHLORIDE 1 MG: 1 INJECTION, SOLUTION INTRAMUSCULAR; INTRAVENOUS; SUBCUTANEOUS at 10:23

## 2018-07-10 RX ADMIN — HYDROMORPHONE HYDROCHLORIDE 0.5 MG: 1 INJECTION, SOLUTION INTRAMUSCULAR; INTRAVENOUS; SUBCUTANEOUS at 15:54

## 2018-07-10 RX ADMIN — SODIUM CHLORIDE 125 ML/HR: 0.9 INJECTION, SOLUTION INTRAVENOUS at 22:07

## 2018-07-10 RX ADMIN — ONDANSETRON 4 MG: 2 INJECTION INTRAMUSCULAR; INTRAVENOUS at 14:13

## 2018-07-10 RX ADMIN — SODIUM CHLORIDE 125 ML/HR: 0.9 INJECTION, SOLUTION INTRAVENOUS at 14:00

## 2018-07-10 RX ADMIN — HYDROMORPHONE HYDROCHLORIDE 1 MG: 1 INJECTION, SOLUTION INTRAMUSCULAR; INTRAVENOUS; SUBCUTANEOUS at 11:25

## 2018-07-10 RX ADMIN — SODIUM CHLORIDE 1000 ML: 0.9 INJECTION, SOLUTION INTRAVENOUS at 10:16

## 2018-07-10 RX ADMIN — ONDANSETRON 4 MG: 2 INJECTION INTRAMUSCULAR; INTRAVENOUS at 20:25

## 2018-07-10 NOTE — ED PROVIDER NOTES
History  Chief Complaint   Patient presents with    Flank Pain     Pt states she was dx with a kidney stoned on friday via out pt kidney stone  per pt pain increased in the past two hours and has caused her to vomit  Pt deneis fevers/dizzines/blood in urine     26 yo healthy female with hx of kidney stones who began last week with gross hematuria and R flank pain  Seen by PCP Friday and had urine dipped - large blood, sent for CT which showed mild right-sided hydronephrosis  Small right 2 mm distal ureteral calculus in the region of the ureterovesical junction  Pt has been doing well until about 1915 when she had sudden onset of severe R flank pain, sweating, nausea and vomiting  Pain and nausea have improved but she remains with mild R flank "aching" and mild nausea  History provided by:  Patient   used: No    Flank Pain   Pain location:  R flank  Pain quality: aching    Pain radiates to:  R flank  Pain severity:  Severe  Onset quality:  Sudden  Duration:  1 hour  Timing:  Constant  Progression:  Improving  Chronicity:  New  Relieved by:  Nothing  Worsened by:  Nothing  Ineffective treatments:  None tried  Associated symptoms: anorexia, hematuria, nausea and vomiting    Associated symptoms: no chest pain, no chills, no diarrhea, no dysuria, no fatigue, no fever, no shortness of breath, no sore throat, no vaginal bleeding and no vaginal discharge        Prior to Admission Medications   Prescriptions Last Dose Informant Patient Reported? Taking?   meloxicam (MOBIC) 15 mg tablet   No No   Sig: Take 1 tablet (15 mg total) by mouth daily   mometasone (ELOCON) 0 1 % cream   No No   Sig: Apply topically daily   progesterone (ENDOMETRIN) 100 MG vaginal insert   Yes No   Sig: progesterone      Facility-Administered Medications: None       History reviewed  No pertinent past medical history  History reviewed  No pertinent surgical history  History reviewed   No pertinent family history  I have reviewed and agree with the history as documented  Social History   Substance Use Topics    Smoking status: Never Smoker    Smokeless tobacco: Never Used    Alcohol use No        Review of Systems   Constitutional: Negative for appetite change, chills, fatigue and fever  HENT: Negative for congestion and sore throat  Eyes: Negative for visual disturbance  Respiratory: Negative for shortness of breath  Cardiovascular: Negative for chest pain  Gastrointestinal: Positive for anorexia, nausea and vomiting  Negative for diarrhea  Genitourinary: Positive for flank pain (R sided) and hematuria  Negative for dysuria, frequency, vaginal bleeding and vaginal discharge  Musculoskeletal: Negative for back pain, neck pain and neck stiffness  Skin: Negative for pallor and rash  Allergic/Immunologic: Negative for immunocompromised state  Neurological: Negative for light-headedness and headaches  Psychiatric/Behavioral: Negative for confusion  All other systems reviewed and are negative  Physical Exam  Physical Exam   Constitutional: She is oriented to person, place, and time  She appears well-developed and well-nourished  No distress  HENT:   Head: Normocephalic and atraumatic  Mouth/Throat: Oropharynx is clear and moist    Eyes: EOM are normal  Pupils are equal, round, and reactive to light  Neck: Normal range of motion  Neck supple  Cardiovascular: Normal rate and regular rhythm  No murmur heard  Pulmonary/Chest: Effort normal and breath sounds normal  No respiratory distress  Abdominal: Soft  Bowel sounds are normal  There is no tenderness  Musculoskeletal: Normal range of motion  Mild R CVA tenderness   Neurological: She is alert and oriented to person, place, and time  Skin: Skin is warm  Capillary refill takes less than 2 seconds  No rash noted  No pallor  Psychiatric: She has a normal mood and affect   Her behavior is normal    Nursing note and vitals reviewed        Vital Signs  ED Triage Vitals [07/09/18 2019]   Temperature Pulse Respirations Blood Pressure SpO2   98 °F (36 7 °C) 75 18 136/76 99 %      Temp src Heart Rate Source Patient Position - Orthostatic VS BP Location FiO2 (%)   -- Monitor Sitting Right arm --      Pain Score       Worst Possible Pain           Vitals:    07/09/18 2019   BP: 136/76   Pulse: 75   Patient Position - Orthostatic VS: Sitting       Visual Acuity      ED Medications  Medications   ondansetron (ZOFRAN-ODT) dispersible tablet 4 mg (4 mg Oral Given 7/9/18 2114)   ketorolac (TORADOL) injection 30 mg (30 mg Intramuscular Given 7/9/18 2114)       Diagnostic Studies  Results Reviewed     Procedure Component Value Units Date/Time    Urine Microscopic [[id number]]  (Abnormal) Collected:  07/09/18 2048    Lab Status:  Final result Specimen:  Urine from Urine, Clean Catch Updated:  07/09/18 2141     RBC, UA 4-10 (A) /hpf      WBC, UA None Seen /hpf      Epithelial Cells Occasional /hpf      Bacteria, UA Occasional /hpf      AMORPH URATES Moderate /hpf     POCT pregnancy, urine [[id number]]  (Normal) Resulted:  07/09/18 2044    Lab Status:  Final result Updated:  07/09/18 2044     EXT PREG TEST UR (Ref: Negative) negative    POCT urinalysis dipstick [[id number]]  (Abnormal) Resulted:  07/09/18 2044    Lab Status:  Final result Updated:  07/09/18 2044    ED Urine Macroscopic [[id number]]  (Abnormal) Collected:  07/09/18 2048    Lab Status:  Final result Specimen:  Urine Updated:  07/09/18 2042     Color, UA Yellow     Clarity, UA Clear     pH, UA 7 0     Leukocytes, UA Negative     Nitrite, UA Negative     Protein, UA Trace (A) mg/dl      Glucose, UA Negative mg/dl      Ketones, UA 15 (1+) (A) mg/dl      Urobilinogen, UA 0 2 E U /dl      Bilirubin, UA Negative     Blood, UA Moderate (A)     Specific [place], UA 1 015    Narrative:       CLINITEK RESULT                 No orders to display              Procedures  Procedures       Phone Contacts  ED Phone Contact    ED Course  ED Course as of Jul 10 0014   Mon Jul 09, 2018   2102 Pt seen and examined   Pt is a 26 yo healthy female with hx of kidney stones who began last week with gross hematuria and R flank pain  Seen by PCP Friday and had urine dipped - large blood, sent for CT which showed mild right-sided hydronephrosis  Small right 2 mm distal ureteral calculus in the region of the ureterovesical junction  Pt has been doing well until about 1915 when she had sudden onset of severe R flank pain, sweating, nausea and vomiting  Pain and nausea have improved but she remains with mild R flank "aching" and mild nausea  Will give ODT zofran, toradol IM and d/c home on NSAIDS, zofran prn and percocet prn and f/u with urology  Pt to continue straining urine  MDM  CritCare Time    Disposition  Final diagnoses:   Renal colic on right side   Nausea & vomiting     Time reflects when diagnosis was documented in both MDM as applicable and the Disposition within this note     Time User Action Codes Description Comment    7/9/2018  9:09 PM Madeline KENNEDY Add [G59] Renal colic on right side     7/9/2018  9:10 PM Madeline KENNEDY Add [R11 2] Nausea & vomiting       ED Disposition     ED Disposition Condition Comment    Discharge  Dock Halt discharge to home/self care      Condition at discharge: Good        Follow-up Information     Follow up With Specialties Details Why Kimber[facility]  51  For Urology [facility] Schedule an appointment as soon as possible for a visit  [address] [place] [address]  [phone]  [phone]          Discharge Medication List as of 7/9/2018  9:10 PM      START taking these medications    Details   ibuprofen (MOTRIN) 800 mg tablet Take 1 tablet (800 mg total) by mouth every 8 (eight) hours as needed for mild pain or moderate pain for up to 10 days, Starting Mon 7/9/2018, Until Thu 7/19/2018, Print      ondansetron (ZOFRAN-ODT) 4 mg disintegrating tablet Take 1 tablet (4 mg total) by mouth every 8 (eight) hours as needed for nausea or vomiting for up to 7 days, Starting Mon 7/9/2018, Until Mon 7/16/2018, Print      oxyCODONE-acetaminophen (PERCOCET) 5-325 mg per tablet Take 1 tablet by mouth every 8 (eight) hours as needed for moderate pain or severe pain for up to 5 days Max Daily Amount: 3 tablets, Starting Mon 7/9/2018, Until Sat 7/14/2018, Print         CONTINUE these medications which have NOT CHANGED    Details   meloxicam (MOBIC) 15 mg tablet Take 1 tablet (15 mg total) by mouth daily, Starting Mon 5/7/2018, Normal      mometasone (ELOCON) 0 1 % cream Apply topically daily, Starting Tue 3/6/2018, Normal      progesterone (ENDOMETRIN) 100 MG vaginal insert progesterone, Historical Med           No discharge procedures on file      ED Provider  Electronically Signed by           Nano Momin DO  07/10/18 7538

## 2018-07-10 NOTE — H&P
History and Physical - [facility] Internal Medicine    Patient Information: Petrona Aguilar 25 y o  female MRN: [id number]  Unit/Bed#: [facility] 2 [facility] 87 [id number] Encounter: [id number]  Admitting Physician: Alejandrina Carlson MD  PCP: Katlyn Bates DO  Date of Admission:  07/10/18    Assessment/Plan:    Hospital Problem List:     Principal Problem:    Nephrolithiasis  Active Problems:    Flank pain    Renal colic    Ureteric colic    Right flank pain      Plan for the Primary Problem(s):  1  Nephrolithiasis  Ongoing severe pain  Patient's in mild distress due to flank pain  Unusually high amount of pain for small stone at this time  Continue supportive measures  Opiates for pain control  IV fluids  Urology consultation  Tamsulosin/strain urine        VTE Prophylaxis: Heparin  / sequential compression device   Code Status: Full code  POLST: There is no POLST form on file for this patient (pre-hospital)    Anticipated Length of Stay:  Patient will be admitted on an Observation basis with an anticipated length of stay of  Greater than 2 midnights  Justification for Hospital Stay: Renal colic    Total Time for Visit, including Counseling / Coordination of Care: 45 minutes  Greater than 50% of this total time spent on direct patient counseling and coordination of care  Chief Complaint:   Flank pain    History of Present Illness:    Petrona Aguilar is a 25 y o  female who presents with Right-sided flank pain ×2 weeks  Patient also reports she was seen in emergency department due to 2 mm stone and was given Percocet for pain relief and was discharged  Patient reports pain is ongoing for approximately 3-4 days now "I need something done about this "denies fever chills denies nausea vomiting denies any other symptoms At this time  Denies any other symptoms was given Dilaudid which is helping substantially  Urology was notified about the patient Formal consultation patient has been placed           Review of Systems:    Review of Systems  As per HPI  Past Medical and Surgical History:     History reviewed  No pertinent past medical history  History reviewed  No pertinent surgical history  Meds/Allergies:    Prior to Admission medications    Medication Sig Start Date End Date Taking? Authorizing Provider   ondansetron (ZOFRAN-ODT) 4 mg disintegrating tablet Take 1 tablet (4 mg total) by mouth every 8 (eight) hours as needed for nausea or vomiting for up to 7 days 7/9/18 7/16/18 Yes Nadia Arnett,    oxyCODONE-acetaminophen (PERCOCET) 5-325 mg per tablet Take 1 tablet by mouth every 8 (eight) hours as needed for moderate pain or severe pain for up to 5 days Max Daily Amount: 3 tablets 7/9/18 7/14/18 Yes Nadia Arnett DO   ibuprofen (MOTRIN) 800 mg tablet Take 1 tablet (800 mg total) by mouth every 8 (eight) hours as needed for mild pain or moderate pain for up to 10 days 7/9/18 7/10/18  Nadia Arnett DO   meloxicam (MOBIC) 15 mg tablet Take 1 tablet (15 mg total) by mouth daily 5/7/18 7/10/18  Lise Sandhu DO   mometasone (ELOCON) 0 1 % cream Apply topically daily 3/6/18 7/10/18  Lise Sandhu, DO   progesterone (ENDOMETRIN) 100 MG vaginal insert progesterone  7/10/18  Historical Provider, MD OLIVAS have reviewed home medications with patient personally  Allergies:    Allergies   Allergen Reactions    Other     Pollen Extract        Social History:     Marital Status: /Civil Union     History   Alcohol Use No     History   Smoking Status    Never Smoker   Smokeless Tobacco    Never Used     History   Drug Use No       Family History:    non-contributory    Physical Exam:     Vitals:   Blood Pressure: 122/78 (07/10/18 1324)  Pulse: 71 (07/10/18 1324)  Temperature: (!) 97 4 °F (36 3 °C) (07/10/18 1324)  Temp Source: Tympanic (07/10/18 1324)  Respirations: 16 (07/10/18 1324)  Weight - Scale: 86 1 kg (189 lb 13 1 oz) (07/10/18 0957)  SpO2: 99 % (07/10/18 1324)    Physical Exam    GENERAL APPEARANCE: WD/WN in NAD pleasant  SKIN: no rash  HEENT: NC/AT, PERRLA (B), moist MM, no epistaxis  NECK: Supple, no JVD    LUNGS: CTA (B) mildly prolonged expiratory phase,   no use of accessory muscles  HEART:          S1S 2, RRR  , PMI is not displaced  ABDOMEN: Soft, nontender, nondistended, +BS  Rectal exam:  EXTREMITIES: no edema , Right flank CVA tenderness noted and endorsed by patient  PERIPHERAL VASCULAR: palpable pulses   NEURO:  AAO x 3, CN 2-12: non focal  MUSCLE STRENGHT: 5/5 (B), SENSATION: nonfocal  DTR: ++, CEREBELLAR: non focal    Additional Data:     Lab Results: I have personally reviewed pertinent reports  Results from last 7 days  Lab Units 07/10/18  1016   WBC Thousand/uL 10 92*   HEMOGLOBIN g/dL 12 8   HEMATOCRIT % 39 8   PLATELETS Thousands/uL 206   NEUTROS PCT % 80*   LYMPHS PCT % 14   MONOS PCT % 6   EOS PCT % 0       Results from last 7 days  Lab Units 07/10/18  1016   SODIUM mmol/L 137   POTASSIUM mmol/L 3 7   CHLORIDE mmol/L 101   CO2 mmol/L 27   BUN mg/dL 15   CREATININE mg/dL 1 08   CALCIUM mg/dL 9 0   TOTAL PROTEIN g/dL 8 1   BILIRUBIN TOTAL mg/dL 0 43   ALK PHOS U/L 64   ALT U/L 36   AST U/L 24   GLUCOSE RANDOM mg/dL 101           Imaging: I have personally reviewed pertinent reports  Xr Abdomen 1 View Kub    Result Date: 7/2/2018  Narrative: ABDOMEN INDICATION:   R10 9: Unspecified abdominal pain    right abdomen pain, hematuria since yesterday, family hx  of kidney stones COMPARISON:  6/2/2008 VIEWS:  AP supine FINDINGS: No radiopaque renal calculi seen  No radiopaque ureteral calculi identified  Nonobstructive bowel gas pattern  Moderate amount of retained colonic stool in the right hemicolon  No acute osseous abnormality is seen  Impression: No radiopaque urinary tract calculi   Workstation performed: [id number]     Ct Renal Stone Study Abdomen Pelvis Wo Contrast    Result Date: 7/6/2018  Narrative: CT ABDOMEN AND PELVIS WITHOUT IV CONTRAST - LOW DOSE RENAL STONE INDICATION:   R31 9: Hematuria, unspecified  Mild to moderate right pain  COMPARISON:  June 2, 2008 TECHNIQUE:  Low dose thin section CT examination of the abdomen and pelvis was performed without intravenous or oral contrast according to a protocol specifically designed to evaluate for urinary tract calculus  Axial, sagittal, and coronal 2D reformatted images were created from the source data and submitted for interpretation  Evaluation for pathology in the abdomen and pelvis that is unrelated to urinary tract calculi is limited  Radiation dose length product (DLP) for this visit:  245 mGy-cm   This examination, like all CT scans performed in the [facility], was performed utilizing techniques to minimize radiation dose exposure, including the use of iterative reconstruction and automated exposure control  FINDINGS: RIGHT KIDNEY AND URETER: There is a 2 mm in the right hemipelvis in the region of the distal right ureter just above the ureterovesical junction on image 137 of series 2  There is mild right-sided hydronephrosis  No other right-sided urinary tract calculi  LEFT KIDNEY AND URETER: No urinary tract calculi  No hydronephrosis or hydroureter  URINARY BLADDER: Unremarkable  No significant abnormality in the visualized lung bases  Limited low radiation dose noncontrast CT evaluation demonstrates no clinically significant abnormality of liver, spleen, pancreas, or adrenal glands  No calcified gallstones or gallbladder wall thickening noted  Small volume of free pelvic fluid is likely physiologic in a patient of this age and gender  No bulky lymphadenopathy on this limited noncontrast study  Bowel loops appear unremarkable  The appendix is well seen and there is no evidence of acute appendicitis  No acute fracture or destructive osseous lesion is identified  Left adnexal cyst measures 4 6 cm  Impression: Mild right-sided hydronephrosis    Small right 2 mm distal ureteral calculus in the region of the ureterovesical junction  Left adnexal cyst measuring 4 6 cm  Workstation performed: [id number]       EKG, Pathology, and Other Studies Reviewed on Admission:   · EKG: Noncontributory    Allscripts / Epic Records Reviewed: No     ** Please Note: This note has been constructed using a voice recognition system   **

## 2018-07-10 NOTE — PLAN OF CARE
DISCHARGE PLANNING     Discharge to home or other facility with appropriate resources Progressing        GASTROINTESTINAL - ADULT     Minimal or absence of nausea and/or vomiting Progressing     Maintains adequate nutritional intake Progressing        GENITOURINARY - ADULT     Maintains or returns to baseline urinary function Progressing     Absence of urinary retention Progressing     Urinary catheter remains patent Progressing        INFECTION - ADULT     Absence or prevention of progression during hospitalization Progressing        Knowledge Deficit     Patient/family/caregiver demonstrates understanding of disease process, treatment plan, medications, and discharge instructions Progressing        METABOLIC, FLUID AND ELECTROLYTES - ADULT     Fluid balance maintained Progressing        MUSCULOSKELETAL - ADULT     Maintain or return mobility to safest level of function Progressing     Maintain proper alignment of affected body part Progressing        PAIN - ADULT     Verbalizes/displays adequate comfort level or baseline comfort level Progressing        SAFETY ADULT     Patient will remain free of falls Progressing     Maintain or return to baseline ADL function Progressing     Maintain or return mobility status to optimal level Progressing

## 2018-07-10 NOTE — DISCHARGE INSTRUCTIONS
Renal Colic   WHAT YOU NEED TO KNOW:   Renal colic is severe pain in your lower back or sides  The pain is usually on one side, but may be on both sides of your lower back  Renal colic may start quickly, come and go, and become worse over time  Renal colic is caused by a blockage in your urinary tract  The most common cause of a blockage is a kidney stone  Blood clots, ureter spasms, and dead tissue may also block your urinary tract  DISCHARGE INSTRUCTIONS:   Return to the emergency department if:   · You cannot stop vomiting  · You see new or increased bleeding when you urinate  · You are urinating less than usual, or not at all  · Your pain is not getting better even after treatment  Contact your healthcare provider if:   · You have fever  · You need to urinate more often than usual, or right away  · You see a stone in your urine strainer after you urinate  · You have questions or concerns about your condition or care  Medicines:   · Medicines  may help decrease pain and muscle spasms  You may also need medicine to calm your stomach and stop vomiting  · Take your medicine as directed  Contact your healthcare provider if you think your medicine is not helping or if you have side effects  Tell him of her if you are allergic to any medicine  Keep a list of the medicines, vitamins, and herbs you take  Include the amounts, and when and why you take them  Bring the list or the pill bottles to follow-up visits  Carry your medicine list with you in case of an emergency  Manage your symptoms:   · Drink liquids as directed  to help decrease pain and flush blockages from your urinary tract  Ask how much liquid to drink each day and which liquids are best for you  You may need to drink about 3 liters (12 glasses) of liquids each day  Half of your total daily liquids should be water  Limit coffee, tea, and soda to 2 cups daily  Your urine should be pale and clear      · Strain your urine every time you urinate  Urinate into a strainer (funnel with a fine mesh on the bottom) or glass jar to collect kidney stones  Give the kidney stones to your healthcare provider at your next visit  · Eat a variety of healthy foods  Healthy foods include fruits, vegetables, whole-grain breads, low-fat dairy products, beans, lean meats, and fish  You may need to increase the amount of citrus fruit you eat, such as oranges  Ask your healthcare provider how much salt, calcium, and protein you should eat  · Avoid activity in hot temperatures  Heat may cause you to become dehydrated and urinate less  Follow up with your healthcare provider as directed: You may need to return for tests to check if your blockage has cleared  Write down your questions so you remember to ask them during your visits  © 2017 [employer] Information is for End User's use only and may not be sold, redistributed or otherwise used for commercial purposes  All illustrations and images included in CareNotes® are the copyrighted property of A [employer]  or [employer]an [employer]  The above information is an  only  It is not intended as medical advice for individual conditions or treatments  Talk to your doctor, nurse or pharmacist before following any medical regimen to see if it is safe and effective for you

## 2018-07-10 NOTE — ED PROVIDER NOTES
History  Chief Complaint   Patient presents with    Flank Pain     R flank pain around 2 weeks, came to ER last night, stone found  Pain has been worsening since return to home w/percocet not helping w/pain relief  History provided by:  Patient   used: No    Flank Pain   Pain location:  R flank  Pain quality: aching    Pain radiates to:  Groin  Pain severity:  Moderate  Onset quality:  Gradual  Duration:  4 hours  Timing:  Intermittent  Progression:  Waxing and waning  Chronicity:  Recurrent  Context: recent illness    Context comment:  Diagnosed with Ureteric colic, 2 mm right UVJ sotne, was seen yesterday, sent home on Motrin and Percocet, took both today morning around 6 am, still with persisiting pain  Relieved by:  Nothing  Worsened by:  Nothing  Ineffective treatments:  None tried  Associated symptoms: no chest pain, no chills, no cough, no diarrhea, no dysuria, no fever, no nausea, no shortness of breath, no sore throat and no vomiting    Risk factors: has not had multiple surgeries        Prior to Admission Medications   Prescriptions Last Dose Informant Patient Reported? Taking?   ondansetron (ZOFRAN-ODT) 4 mg disintegrating tablet 7/10/2018 at Unknown time  No Yes   Sig: Take 1 tablet (4 mg total) by mouth every 8 (eight) hours as needed for nausea or vomiting for up to 7 days   oxyCODONE-acetaminophen (PERCOCET) 5-325 mg per tablet 7/10/2018 at Unknown time  No Yes   Sig: Take 1 tablet by mouth every 8 (eight) hours as needed for moderate pain or severe pain for up to 5 days Max Daily Amount: 3 tablets      Facility-Administered Medications: None       History reviewed  No pertinent past medical history  History reviewed  No pertinent surgical history  History reviewed  No pertinent family history  I have reviewed and agree with the history as documented      Social History   Substance Use Topics    Smoking status: Never Smoker    Smokeless tobacco: Never Used   [facility] Alcohol use No        Review of Systems   Constitutional: Negative for activity change, chills and fever  HENT: Negative for facial swelling, sore throat and trouble swallowing  Eyes: Negative for pain and visual disturbance  Respiratory: Negative for cough, chest tightness and shortness of breath  Cardiovascular: Negative for chest pain and leg swelling  Gastrointestinal: Negative for abdominal pain, blood in stool, diarrhea, nausea and vomiting  Genitourinary: Positive for flank pain  Negative for dysuria  Musculoskeletal: Negative for back pain, neck pain and neck stiffness  Skin: Negative for pallor and rash  Allergic/Immunologic: Negative for environmental allergies and immunocompromised state  Neurological: Negative for dizziness and headaches  Hematological: Negative for adenopathy  Does not bruise/bleed easily  Psychiatric/Behavioral: Negative for agitation and behavioral problems  All other systems reviewed and are negative  Physical Exam  Physical Exam   Constitutional: She is oriented to person, place, and time  She appears well-developed and well-nourished  No distress  HENT:   Head: Normocephalic and atraumatic  Eyes: EOM are normal    Neck: Normal range of motion  Neck supple  Cardiovascular: Normal rate, regular rhythm, normal heart sounds and intact distal pulses  Pulmonary/Chest: Effort normal and breath sounds normal    Abdominal: Soft  Bowel sounds are normal  There is no tenderness  There is no rebound and no guarding  Musculoskeletal: Normal range of motion  Neurological: She is alert and oriented to person, place, and time  Skin: Skin is warm and dry  Psychiatric: She has a normal mood and affect  Nursing note and vitals reviewed        Vital Signs  ED Triage Vitals   Temperature Pulse Respirations Blood Pressure SpO2   07/10/18 0957 07/10/18 0957 07/10/18 0957 07/10/18 0957 07/10/18 0957   97 5 °F (36 4 °C) 64 18 117/83 100 %      Temp Source Heart Rate Source Patient Position - Orthostatic VS BP Location FiO2 (%)   07/10/18 0957 07/10/18 1028 07/10/18 1028 07/10/18 1028 --   Oral Monitor Sitting Right arm       Pain Score       07/10/18 0957       Worst Possible Pain           Vitals:    07/10/18 1145 07/10/18 1206 07/10/18 1317 07/10/18 1324   BP: 107/69 111/75 118/73 122/78   Pulse: 70 76 78 71   Patient Position - Orthostatic VS: Lying Lying Lying Lying       Visual Acuity      ED Medications  Medications   sodium chloride 0 9 % infusion (125 mL/hr Intravenous New Bag 7/10/18 1400)   enoxaparin (LOVENOX) subcutaneous injection 40 mg (40 mg Subcutaneous Not Given 7/10/18 1421)   acetaminophen (TYLENOL) tablet 650 mg (not administered)   HYDROmorphone (DILAUDID) injection 0 5 mg (0 5 mg Intravenous Given 7/10/18 1554)   oxyCODONE-acetaminophen (PERCOCET) 5-325 mg per tablet 2 tablet (2 tablets Oral Given 7/10/18 1412)   ondansetron (ZOFRAN) injection 4 mg (4 mg Intravenous Given 7/10/18 1413)   sodium chloride 0 9 % bolus 1,000 mL (0 mL Intravenous Stopped 7/10/18 1118)   HYDROmorphone (DILAUDID) injection 1 mg (1 mg Intravenous Given 7/10/18 1023)   HYDROmorphone (DILAUDID) injection 1 mg (1 mg Intravenous Given 7/10/18 1125)   tamsulosin (FLOMAX) capsule 0 4 mg (0 4 mg Oral Given 7/10/18 1314)       Diagnostic Studies  Results Reviewed     Procedure Component Value Units Date/Time    Comprehensive metabolic panel [[id number]] Collected:  07/10/18 1016    Lab Status:  Final result Specimen:  Blood from Arm, Left Updated:  07/10/18 1034     Sodium 137 mmol/L      Potassium 3 7 mmol/L      Chloride 101 mmol/L      CO2 27 mmol/L      Anion Gap 9 mmol/L      BUN 15 mg/dL      Creatinine 1 08 mg/dL      Glucose 101 mg/dL      Calcium 9 0 mg/dL      AST 24 U/L      ALT 36 U/L      Alkaline Phosphatase 64 U/L      Total Protein 8 1 g/dL      Albumin 4 4 g/dL      Total Bilirubin 0 43 mg/dL      eGFR 69 ml/min/1 73sq m     Narrative:         National Kidney Disease Education Program recommendations are as follows:  GFR calculation is accurate only with a steady state creatinine  Chronic Kidney disease less than 60 ml/min/1 73 sq  meters  Kidney failure less than 15 ml/min/1 73 sq  meters      Urine Microscopic [[id number]]  (Abnormal) Collected:  07/10/18 1014    Lab Status:  Final result Specimen:  Urine from Urine, Clean Catch Updated:  07/10/18 1024     RBC, UA 4-10 (A) /hpf      WBC, UA 4-10 (A) /hpf      Epithelial Cells Occasional /hpf      Bacteria, UA Occasional /hpf      MUCOUS THREADS Occasional (A)    CBC and differential [[id number]]  (Abnormal) Collected:  07/10/18 1016    Lab Status:  Final result Specimen:  Blood from Arm, Left Updated:  07/10/18 1021     WBC 10 92 (H) Thousand/uL      RBC 4 43 Million/uL      Hemoglobin 12 8 g/dL      Hematocrit 39 8 %      MCV 90 fL      MCH 28 9 pg      MCHC 32 2 g/dL      RDW 13 7 %      MPV 11 3 fL      Platelets 715 Thousands/uL      nRBC 0 /100 WBCs      Neutrophils Relative 80 (H) %      Lymphocytes Relative 14 %      Monocytes Relative 6 %      Eosinophils Relative 0 %      Basophils Relative 0 %      Neutrophils Absolute 8 71 (H) Thousands/µL      Lymphocytes Absolute 1 48 Thousands/µL      Monocytes Absolute 0 70 Thousand/µL      Eosinophils Absolute 0 01 Thousand/µL      Basophils Absolute 0 02 Thousands/µL     POCT urinalysis dipstick [[id number]]  (Abnormal) Resulted:  07/10/18 1009    Lab Status:  Final result Specimen:  Urine Updated:  07/10/18 1009    POCT pregnancy, urine [[id number]]  (Normal) Resulted:  07/10/18 1009    Lab Status:  Final result Updated:  07/10/18 1009     EXT PREG TEST UR (Ref: Negative) HCG = neg (-)    ED Urine Macroscopic [[id number]]  (Abnormal) Collected:  07/10/18 1014    Lab Status:  Final result Specimen:  Urine Updated:  07/10/18 1008     Color, UA Yellow     Clarity, UA Cloudy     pH, UA 7 5     Leukocytes, UA Trace (A)     Nitrite, UA Negative     Protein,  (2+) (A) mg/dl Glucose, UA Negative mg/dl      Ketones, UA 80 (3+) (A) mg/dl      Urobilinogen, UA 0 2 E U /dl      Bilirubin, UA Interference- unable to analyze (A)     Blood, UA Small (A)     Specific [place], UA 1 020    Narrative:       CLINITEK RESULT                 No orders to display              Procedures  Procedures       Phone Contacts  ED Phone Contact    ED Course  ED Course as of Jul 10 1834   Tue Jul 10, 2018   1121 Labs wnl, patient developed recurrent pain after Dilaudid, we will repeat Dilaudid as patient got Motrin 800 in morning  Urology paged to discuss  1201 Case discussed with Carl Donnelly, Urology, labs, CT scan results reviewed, small size stone, he advised symptomatic treatment pain control, admission as needed for pain management  1203 Patient re-evaluated, feels better after 2nd Dilaudid, will have p o  trial, ambulate, and re-evaluate  MDM  Number of Diagnoses or Management Options  Right flank pain: new and requires workup  Ureteric colic: new and requires workup  Diagnosis management comments: Patient with c/o right flank pain, diagnosed with Ureteric colic, 2 mm right UVJ sotne, was seen yesterday, sent home on Motrin and Percocet, took both today morning around 6 am, still with persisiting pain  On exam, patient appears uncomfortable due to pain, VSS, afebrile, mild right flank tenderness, no RLQ tenderness  Impression: Persisting recurrent right flank pain from known ureteric stone  We will give pain meds, check labs, urine to r/o UTI         Amount and/or Complexity of Data Reviewed  Clinical lab tests: ordered and reviewed  Tests in the medicine section of CPT®: ordered and reviewed  Review and summarize past medical records: yes (Recent ER visit, CT, Labs, Urine results )  Discuss the patient with other providers: yes      CritCare Time    Disposition  Final diagnoses:   Right flank pain   Ureteric colic     Time reflects when diagnosis was documented in both MDM as applicable and the Disposition within this note     Time User Action Codes Description Comment    7/10/2018 10:21 AM Tamiko Asp Add [R10 9] Right flank pain     7/10/2018 12:47 PM Tamiko Asp Add [Z59] Ureteric colic     5/74/6285  6:80 PM Annabel Hamilton Add [R10 9] Flank pain     7/10/2018  4:49 PM Annabel Hamilton Add [N20 0] Nephrolithiasis       ED Disposition     ED Disposition Condition Comment    Admit  Case was discussed with Dr Tate De La Garza and the patient's admission status was agreed to be Admission Status: observation status to the service of Dr Tate De La Garza  Follow-up Information    None         Current Discharge Medication List      CONTINUE these medications which have NOT CHANGED    Details   ondansetron (ZOFRAN-ODT) 4 mg disintegrating tablet Take 1 tablet (4 mg total) by mouth every 8 (eight) hours as needed for nausea or vomiting for up to 7 days  Qty: 20 tablet, Refills: 0    Associated Diagnoses: Nausea & vomiting      oxyCODONE-acetaminophen (PERCOCET) 5-325 mg per tablet Take 1 tablet by mouth every 8 (eight) hours as needed for moderate pain or severe pain for up to 5 days Max Daily Amount: 3 tablets  Qty: 10 tablet, Refills: 0    Associated Diagnoses: Renal colic on right side           No discharge procedures on file      ED Provider  Electronically Signed by           Megan Lopez MD  07/10/18 9272       Megan Lopez MD  07/10/18 7657

## 2018-07-11 ENCOUNTER — ANESTHESIA (OUTPATIENT)
Dept: PERIOP | Facility: HOSPITAL | Age: 28
End: 2018-07-11
Payer: COMMERCIAL

## 2018-07-11 ENCOUNTER — APPOINTMENT (OUTPATIENT)
Dept: RADIOLOGY | Facility: HOSPITAL | Age: 28
End: 2018-07-11
Payer: COMMERCIAL

## 2018-07-11 ENCOUNTER — APPOINTMENT (OUTPATIENT)
Dept: CT IMAGING | Facility: HOSPITAL | Age: 28
End: 2018-07-11
Payer: COMMERCIAL

## 2018-07-11 VITALS
OXYGEN SATURATION: 96 % | WEIGHT: 189.82 LBS | BODY MASS INDEX: 28.77 KG/M2 | HEART RATE: 74 BPM | TEMPERATURE: 98 F | DIASTOLIC BLOOD PRESSURE: 63 MMHG | RESPIRATION RATE: 17 BRPM | SYSTOLIC BLOOD PRESSURE: 104 MMHG

## 2018-07-11 LAB
ALBUMIN SERPL BCP-MCNC: 3.7 G/DL (ref 3.5–5)
ALP SERPL-CCNC: 59 U/L (ref 46–116)
ALT SERPL W P-5'-P-CCNC: 27 U/L (ref 12–78)
ANION GAP SERPL CALCULATED.3IONS-SCNC: 8 MMOL/L (ref 4–13)
AST SERPL W P-5'-P-CCNC: 21 U/L (ref 5–45)
BILIRUB SERPL-MCNC: 0.68 MG/DL (ref 0.2–1)
BUN SERPL-MCNC: 9 MG/DL (ref 5–25)
CALCIUM SERPL-MCNC: 8.6 MG/DL (ref 8.3–10.1)
CHLORIDE SERPL-SCNC: 103 MMOL/L (ref 100–108)
CO2 SERPL-SCNC: 26 MMOL/L (ref 21–32)
CREAT SERPL-MCNC: 0.78 MG/DL (ref 0.6–1.3)
ERYTHROCYTE [DISTWIDTH] IN BLOOD BY AUTOMATED COUNT: 14 % (ref 11.6–15.1)
GFR SERPL CREATININE-BSD FRML MDRD: 102 ML/MIN/1.73SQ M
GLUCOSE P FAST SERPL-MCNC: 79 MG/DL (ref 65–99)
GLUCOSE SERPL-MCNC: 79 MG/DL (ref 65–140)
HCT VFR BLD AUTO: 37.8 % (ref 34.8–46.1)
HGB BLD-MCNC: 11.9 G/DL (ref 11.5–15.4)
MCH RBC QN AUTO: 28.9 PG (ref 26.8–34.3)
MCHC RBC AUTO-ENTMCNC: 31.5 G/DL (ref 31.4–37.4)
MCV RBC AUTO: 92 FL (ref 82–98)
PLATELET # BLD AUTO: 185 THOUSANDS/UL (ref 149–390)
PMV BLD AUTO: 11.7 FL (ref 8.9–12.7)
POTASSIUM SERPL-SCNC: 3.6 MMOL/L (ref 3.5–5.3)
PROT SERPL-MCNC: 7.4 G/DL (ref 6.4–8.2)
RBC # BLD AUTO: 4.12 MILLION/UL (ref 3.81–5.12)
SODIUM SERPL-SCNC: 137 MMOL/L (ref 136–145)
WBC # BLD AUTO: 8.33 THOUSAND/UL (ref 4.31–10.16)

## 2018-07-11 PROCEDURE — 99217 PR OBSERVATION CARE DISCHARGE MANAGEMENT: CPT | Performed by: INTERNAL MEDICINE

## 2018-07-11 PROCEDURE — 85027 COMPLETE CBC AUTOMATED: CPT | Performed by: INTERNAL MEDICINE

## 2018-07-11 PROCEDURE — 99225 PR SBSQ OBSERVATION CARE/DAY 25 MINUTES: CPT | Performed by: UROLOGY

## 2018-07-11 PROCEDURE — C1758 CATHETER, URETERAL: HCPCS | Performed by: UROLOGY

## 2018-07-11 PROCEDURE — 80053 COMPREHEN METABOLIC PANEL: CPT | Performed by: INTERNAL MEDICINE

## 2018-07-11 PROCEDURE — 72192 CT PELVIS W/O DYE: CPT

## 2018-07-11 PROCEDURE — C2617 STENT, NON-COR, TEM W/O DEL: HCPCS | Performed by: UROLOGY

## 2018-07-11 PROCEDURE — 52332 CYSTOSCOPY AND TREATMENT: CPT | Performed by: UROLOGY

## 2018-07-11 PROCEDURE — 74450 X-RAY URETHRA/BLADDER: CPT

## 2018-07-11 PROCEDURE — 82360 CALCULUS ASSAY QUANT: CPT | Performed by: UROLOGY

## 2018-07-11 PROCEDURE — C1769 GUIDE WIRE: HCPCS | Performed by: UROLOGY

## 2018-07-11 PROCEDURE — 52352 CYSTOURETERO W/STONE REMOVE: CPT | Performed by: UROLOGY

## 2018-07-11 DEVICE — STENT URETERAL 6 FR 26CM INLAY OPTIMA: Type: IMPLANTABLE DEVICE | Site: URETER | Status: FUNCTIONAL

## 2018-07-11 RX ORDER — PHENAZOPYRIDINE HYDROCHLORIDE 100 MG/1
100 TABLET, FILM COATED ORAL
Status: DISCONTINUED | OUTPATIENT
Start: 2018-07-11 | End: 2018-07-11 | Stop reason: HOSPADM

## 2018-07-11 RX ORDER — ONDANSETRON 2 MG/ML
4 INJECTION INTRAMUSCULAR; INTRAVENOUS ONCE AS NEEDED
Status: DISCONTINUED | OUTPATIENT
Start: 2018-07-11 | End: 2018-07-11 | Stop reason: HOSPADM

## 2018-07-11 RX ORDER — FENTANYL CITRATE 50 UG/ML
INJECTION, SOLUTION INTRAMUSCULAR; INTRAVENOUS AS NEEDED
Status: DISCONTINUED | OUTPATIENT
Start: 2018-07-11 | End: 2018-07-11 | Stop reason: SURG

## 2018-07-11 RX ORDER — CEFAZOLIN SODIUM 1 G/3ML
INJECTION, POWDER, FOR SOLUTION INTRAMUSCULAR; INTRAVENOUS AS NEEDED
Status: DISCONTINUED | OUTPATIENT
Start: 2018-07-11 | End: 2018-07-11 | Stop reason: SURG

## 2018-07-11 RX ORDER — LIDOCAINE HYDROCHLORIDE 20 MG/ML
JELLY TOPICAL AS NEEDED
Status: DISCONTINUED | OUTPATIENT
Start: 2018-07-11 | End: 2018-07-11 | Stop reason: HOSPADM

## 2018-07-11 RX ORDER — PROPOFOL 10 MG/ML
INJECTION, EMULSION INTRAVENOUS AS NEEDED
Status: DISCONTINUED | OUTPATIENT
Start: 2018-07-11 | End: 2018-07-11 | Stop reason: SURG

## 2018-07-11 RX ORDER — MAGNESIUM HYDROXIDE 1200 MG/15ML
LIQUID ORAL AS NEEDED
Status: DISCONTINUED | OUTPATIENT
Start: 2018-07-11 | End: 2018-07-11 | Stop reason: HOSPADM

## 2018-07-11 RX ORDER — SODIUM CHLORIDE 9 MG/ML
INJECTION, SOLUTION INTRAVENOUS AS NEEDED
Status: DISCONTINUED | OUTPATIENT
Start: 2018-07-11 | End: 2018-07-11 | Stop reason: HOSPADM

## 2018-07-11 RX ORDER — MIDAZOLAM HYDROCHLORIDE 1 MG/ML
INJECTION INTRAMUSCULAR; INTRAVENOUS AS NEEDED
Status: DISCONTINUED | OUTPATIENT
Start: 2018-07-11 | End: 2018-07-11 | Stop reason: SURG

## 2018-07-11 RX ORDER — KETOROLAC TROMETHAMINE 30 MG/ML
INJECTION, SOLUTION INTRAMUSCULAR; INTRAVENOUS AS NEEDED
Status: DISCONTINUED | OUTPATIENT
Start: 2018-07-11 | End: 2018-07-11 | Stop reason: SURG

## 2018-07-11 RX ORDER — TAMSULOSIN HYDROCHLORIDE 0.4 MG/1
0.4 CAPSULE ORAL
Qty: 30 CAPSULE | Refills: 0 | Status: SHIPPED | OUTPATIENT
Start: 2018-07-11 | End: 2018-12-13

## 2018-07-11 RX ORDER — FENTANYL CITRATE/PF 50 MCG/ML
25 SYRINGE (ML) INJECTION
Status: DISCONTINUED | OUTPATIENT
Start: 2018-07-11 | End: 2018-07-11 | Stop reason: HOSPADM

## 2018-07-11 RX ORDER — LIDOCAINE HYDROCHLORIDE 10 MG/ML
INJECTION, SOLUTION INFILTRATION; PERINEURAL AS NEEDED
Status: DISCONTINUED | OUTPATIENT
Start: 2018-07-11 | End: 2018-07-11 | Stop reason: SURG

## 2018-07-11 RX ORDER — TAMSULOSIN HYDROCHLORIDE 0.4 MG/1
0.4 CAPSULE ORAL
Status: DISCONTINUED | OUTPATIENT
Start: 2018-07-11 | End: 2018-07-11 | Stop reason: HOSPADM

## 2018-07-11 RX ADMIN — MIDAZOLAM 2 MG: 1 INJECTION INTRAMUSCULAR; INTRAVENOUS at 15:19

## 2018-07-11 RX ADMIN — KETOROLAC TROMETHAMINE 30 MG: 30 INJECTION, SOLUTION INTRAMUSCULAR at 15:44

## 2018-07-11 RX ADMIN — FENTANYL CITRATE 50 MCG: 50 INJECTION, SOLUTION INTRAMUSCULAR; INTRAVENOUS at 15:26

## 2018-07-11 RX ADMIN — SODIUM CHLORIDE 125 ML/HR: 0.9 INJECTION, SOLUTION INTRAVENOUS at 07:27

## 2018-07-11 RX ADMIN — LIDOCAINE HYDROCHLORIDE 60 MG: 10 INJECTION, SOLUTION INFILTRATION; PERINEURAL at 15:26

## 2018-07-11 RX ADMIN — TAMSULOSIN HYDROCHLORIDE 0.4 MG: 0.4 CAPSULE ORAL at 17:30

## 2018-07-11 RX ADMIN — ONDANSETRON 8 MG: 2 INJECTION INTRAMUSCULAR; INTRAVENOUS at 15:36

## 2018-07-11 RX ADMIN — FENTANYL CITRATE 25 MCG: 50 INJECTION INTRAMUSCULAR; INTRAVENOUS at 16:27

## 2018-07-11 RX ADMIN — PHENAZOPYRIDINE HYDROCHLORIDE 100 MG: 100 TABLET ORAL at 17:30

## 2018-07-11 RX ADMIN — SODIUM CHLORIDE 125 ML/HR: 0.9 INJECTION, SOLUTION INTRAVENOUS at 14:49

## 2018-07-11 RX ADMIN — CEFAZOLIN 2000 MG: 1 INJECTION, POWDER, FOR SOLUTION INTRAVENOUS at 15:30

## 2018-07-11 RX ADMIN — FENTANYL CITRATE 50 MCG: 50 INJECTION, SOLUTION INTRAMUSCULAR; INTRAVENOUS at 15:44

## 2018-07-11 RX ADMIN — PROPOFOL 200 MG: 10 INJECTION, EMULSION INTRAVENOUS at 15:26

## 2018-07-11 RX ADMIN — FENTANYL CITRATE 25 MCG: 50 INJECTION INTRAMUSCULAR; INTRAVENOUS at 16:21

## 2018-07-11 RX ADMIN — DEXAMETHASONE SODIUM PHOSPHATE 8 MG: 10 INJECTION INTRAMUSCULAR; INTRAVENOUS at 15:36

## 2018-07-11 NOTE — OP NOTE
OPERATIVE REPORT  PATIENT NAME: Leny Shah    :  1990  MRN: [id number]  Pt Location: [facility] OR ROOM 02    SURGERY DATE: 2018    Surgeon(s) and Role:     * Stephany Nation MD - Primary    Preop Diagnosis:  Nephrolithiasis [Z00 1]  Ureteric colic [L37]  Flank pain [R10 9]  Right flank pain [R10 9]    Post-Op Diagnosis Codes:     * Nephrolithiasis [N20 0]     * Ureteric colic [W22]     * Flank pain [R10 9]     * Right flank pain [R10 9]    Procedure(s) (LRB):  CYSTOSCOPY URETEROSCOPY; RETROGRADE PYELOGRAM AND INSERTION STENT URETERAL (Right)    Specimen(s):  ID Type Source Tests Collected by Time Destination   A : distal Calculus Ureter, Right STONE ANALYSIS Stephany Nation MD 2018 1541        Estimated Blood Loss:   Minimal    Drains:  Ureteral Drain/Stent Right ureter 6 Fr  (Active)   Number of days: 0       Anesthesia Type:   Choice    Operative Indications:  Nephrolithiasis [N33 0]  Ureteric colic [X51]  Flank pain [R10 9]  Right flank pain [R10 9]      Operative Findings:  1  Periureteral inflammation around RIGHT ureteral orifice  2  Distal ureteral stone, easily basketed  3  Stent placed in good position, string left    Complications:   None    Procedure and Technique:  Leny Shah is a 25y o -year-old female  with a history of distal right ureteral stone  Unable to pass stone spontaneously after admission  Risk and benefits of ureteroscopy were discussed and reviewed  Informed consent was obtained  The patient was brought to the operating room on 2018  After the smooth induction of general/LMA anesthesia, the patient was placed in the dorsal lithotomy position  Her genitalia was prepped and draped in a sterile fashion  Intravenous antibiotics were administered in the form of ancef  A timeout was performed with all members of the operative team confirmed the patient's identity, procedure to be performed, and laterality of the case      A 22 Welsh rigid cystoscope with 30° lens was inserted  The bladder was thoroughly inspected  It was no evidence of mucosal abnormalities or lesions  There were no calculi identified  Attention was focused on the right ureteral orifice   flouroscopy demonstrated a nonradioopaque stone  A Bard Solo Plus wire was passed proximal to the stone and secured as a safety  A dual lumen catheter was then advanced over the wire and used to dilate the very distal ureter under direct vision  The dual-lumen was removed leaving the safety wire in place  A short semirigid ureteroscope was then inserted adjacent to the wire  The stone was easily grasped with an Escape basket and removed intact  The ureteroscope was then repassed multiple times to ensure that the ureter was free and clear of any residual stones  Additional contrast was injected as a roadmap for stent insertion  The ureteroscope was then removed  A 6 Indonesian 26 double-J ureteral stent was then placed over the previously banked safety wire without difficulty  The proximal coil was appreciated in the RIGHT renal pelvis and the distal coil was visualized within the bladder  The bladder was emptied and the cystoscope was removed  A string was left in place and secured to the right thigh with Tegaderm  2% viscous lidocaine was placed per urethra  Overall the patient tolerated the procedure well and were no complications  The patient was extubated in the operating room and transferred to the PACU in stable condition at the conclusion of the case      PLAN-stent with string removal in 3-5 days     I was present for the entire procedure    Patient Disposition:  PACU     SIGNATURE: Radha Rockwell MD  DATE: July 11, 2018  TIME: 3:53 PM

## 2018-07-11 NOTE — PROGRESS NOTES
Progress Note - Urology Progress  Yonny Ness 25 y o  female MRN: [id number]  Unit/Bed#: [facility] -01 Encounter: [id number]    Assessment:  Right distal ureteral stone    Plan:  Patient is on the add on list for ureteroscopy today  She wishes to proceed with surgery  She will be kept NPO  The stone is small and very distal   Continue to strain urine in hopes of spontaneous passage prior to her procedure  Subjective/Objective       Subjective:  Patient notes less pain  She still has mild right discomfort  She also notes some irritative voiding symptoms and mild dysuria  Overall she is feeling better  No fever or nausea    Objective:     Blood pressure 107/60, pulse 74, temperature 98 5 °F (36 9 °C), temperature source Temporal, resp  rate 18, weight 86 1 kg (189 lb 13 1 oz), last menstrual period 06/15/2018, SpO2 96 %  ,Body mass index is 28 77 kg/m²  Intake/Output Summary (Last 24 hours) at 07/11/18 0753  Last data filed at 07/11/18 9901   Gross per 24 hour   Intake             3380 ml   Output              300 ml   Net             3080 ml       Invasive Devices     Peripheral Intravenous Line            Peripheral IV 07/10/18 Left Antecubital less than 1 day                Physical Exam: General appearance: alert and oriented, in no acute distress  Head: Normocephalic, without obvious abnormality, atraumatic  Neck: supple, symmetrical, trachea midline  Back: symmetric, no curvature  ROM normal  No CVA tenderness  Lungs: Normal respiratory effort  Abdomen: soft, non-tender; bowel sounds normal; no masses,  no organomegaly    Lab, Imaging and other studies:  I have personally reviewed pertinent lab results    , CBC:   Lab Results   Component Value Date    WBC 8 33 07/11/2018    HGB 11 9 07/11/2018    HCT 37 8 07/11/2018    MCV 92 07/11/2018     07/11/2018    MCH 28 9 07/11/2018    MCHC 31 5 07/11/2018    RDW 14 0 07/11/2018    MPV 11 7 07/11/2018    NRBC 0 07/10/2018   , CMP:   Lab Results Component Value Date     07/11/2018    K 3 6 07/11/2018     07/11/2018    CO2 26 07/11/2018    ANIONGAP 8 07/11/2018    BUN 9 07/11/2018    CREATININE 0 78 07/11/2018    GLUCOSE 79 07/11/2018    CALCIUM 8 6 07/11/2018    AST 21 07/11/2018    ALT 27 07/11/2018    ALKPHOS 59 07/11/2018    PROT 7 4 07/11/2018    BILITOT 0 68 07/11/2018    EGFR 102 07/11/2018       CT scan of pelvis was just performed- the right distal ureteral stone is still present  It is very distal and is likely at the ureterovesical junction

## 2018-07-11 NOTE — INTERVAL H&P NOTE
H&P reviewed  After examining the patient I find no changes in the patients condition since the H&P had been written  I did meet the patient in the prep and hold area  I reviewed with her her CT scan of the pelvis from this morning  I reviewed with her the plan for surgery which include cystoscopy, right retrograde pyelogram, ureteroscopy, laser lithotripsy, basket extraction of stone, and stent placement  We discussed risks and benefits and the patient signed informed consent  Urine culture negative from the 6th of this month

## 2018-07-11 NOTE — DISCHARGE INSTRUCTIONS
You have stent on a string, which will be removed in 3-5 days  Please take care not to remove with dressing or undressing  Our office staff will contact you to arrange an appointment for removal     Ureteroscopy   WHAT YOU NEED TO KNOW:   A ureteroscopy is a procedure to examine in the inside of your urinary tract, which includes your urethra, bladder, ureters, and kidneys  A ureteroscope is a small, thin tube with a light and camera on the end  Ureteroscopy can help your healthcare provider diagnose and treat problems in your urinary tract, such as kidney stones  DISCHARGE INSTRUCTIONS:   Medicine:   · Antibiotics  may be given to treat or prevent an infection  · Take your medicine as directed  Contact your healthcare provider if you think your medicine is not helping or if you have side effects  Tell him or her if you are allergic to any medicine  Keep a list of the medicines, vitamins, and herbs you take  Include the amounts, and when and why you take them  Bring the list or the pill bottles to follow-up visits  Carry your medicine list with you in case of an emergency  Follow up with your healthcare provider as directed:  Write down your questions so you remember to ask them during your visits  Drink liquids as directed  Liquids can help prevent kidney stones and urinary tract infections  Drink water and limit the amount of caffeine you drink  Caffeine may be found in coffee, tea, soda, sports drinks, and foods  Ask your healthcare provider how much liquid to drink each day  Contact your healthcare provider if:   · You have a fever  · You cannot urinate  · You have blood in your urine  · You are vomiting  · You have pain in your abdomen or side  · You have questions or concerns about your condition or care  © 2017 [facility] Information is for End User's use only and may not be sold, redistributed or otherwise used for commercial purposes   All illustrations and images included in CareNotes® are the copyrighted property of A D [employer] M [employer] Inc  or Karan Carey  The above information is an  only  It is not intended as medical advice for individual conditions or treatments  Talk to your doctor, nurse or pharmacist before following any medical regimen to see if it is safe and effective for you

## 2018-07-11 NOTE — PROGRESS NOTES
UROLOGY PROGRESS NOTE   Patient Identifiers: Alton Rivera (MRN [id number])  Date of Service: 7/11/2018    Subjective:     24 HR EVENTS:   no significant events  Patient reports she had no had pain since 8:15 last evening and has not required any pain medication  Patient has  no complaints  Objective:     VITALS:    Vitals:    07/11/18 0024   BP: 107/60   Pulse: 74   Resp: 18   Temp: 98 5 °F (36 9 °C)   SpO2: 96%       INS & OUTS:  I/O last 24 hours: In: 2400 [P O :420;  I V :980; IV Piggyback:1000]  Out: 300 [Urine:300]    LABS:  Lab Results   Component Value Date    HGB 11 9 07/11/2018    HCT 37 8 07/11/2018    WBC 8 33 07/11/2018     07/11/2018   ]    Lab Results   Component Value Date     07/10/2018    K 3 7 07/10/2018     07/10/2018    CO2 27 07/10/2018    BUN 15 07/10/2018    CREATININE 1 08 07/10/2018    CALCIUM 9 0 07/10/2018    GLUCOSE 101 07/10/2018   ]    INPATIENT MEDS:    Current Facility-Administered Medications:     acetaminophen (TYLENOL) tablet 650 mg, 650 mg, Oral, Q6H PRN, Nils Harley MD    enoxaparin (LOVENOX) subcutaneous injection 40 mg, 40 mg, Subcutaneous, Daily, Nils Harley MD    HYDROmorphone (DILAUDID) injection 0 5 mg, 0 5 mg, Intravenous, Q4H PRN, Nils Harley MD, 0 5 mg at 07/10/18 1554    ondansetron (ZOFRAN) injection 4 mg, 4 mg, Intravenous, Q6H PRN, Nils Harley MD, 4 mg at 07/10/18 2025    oxyCODONE-acetaminophen (PERCOCET) 5-325 mg per tablet 2 tablet, 2 tablet, Oral, Q6H PRN, Nils Harley MD, 2 tablet at 07/10/18 2024    sodium chloride 0 9 % infusion, 125 mL/hr, Intravenous, Continuous, Nils Harley MD, Last Rate: 125 mL/hr at 07/10/18 2207, 125 mL/hr at 07/10/18 2207      Physical Exam:     GEN: no acute distress    RESP: breathing comfortably with no accessory muscle use    ABD: soft, appropriately tender to palpation, non-distended   INCISION: none   EXT: no significant peripheral edema   DRAINS: none  ROBBINS: none    RADIOLOGY:   CT 7/6/18  Small right 2 mm distal ureteral calculus in the region of the ureterovesical junction  Assessment:     Small right 2 mm distal ureteral calculus in the region of the ureterovesical junction    Plan:     - patients pain has resolved, as she is schedule for ureteroscopy will obtain a CT scan without contrast of the pelvis to ensure her stone has not passed  - if stone has passed with cancel OR and FU outpatient  - if stone present will proceed to the OR for cystoscopy and right ureteroscopy  - maintain NPO until CT scan is final       RN participated in rounds with AP  Plan of care discussed with patient and RN

## 2018-07-11 NOTE — ANESTHESIA PREPROCEDURE EVALUATION
Review of Systems/Medical History  Patient summary reviewed        Cardiovascular  Negative cardio ROS    Pulmonary  Negative pulmonary ROS        GI/Hepatic  Negative GI/hepatic ROS          Kidney stones,        Endo/Other  Negative endo/other ROS      GYN  Negative gynecology ROS          Hematology  Negative hematology ROS      Musculoskeletal  Negative musculoskeletal ROS        Neurology  Negative neurology ROS      Psychology   Negative psychology ROS              Physical Exam    Airway    Mallampati score: II  TM Distance: >3 FB  Neck ROM: full     Dental   No notable dental hx     Cardiovascular  Comment: Negative ROS, Cardiovascular exam normal    Pulmonary  Pulmonary exam normal     Other Findings        Anesthesia Plan  ASA Score- 1     Anesthesia Type- general with ASA Monitors  Additional Monitors:   Airway Plan: LMA  Plan Factors-    Induction- intravenous  Postoperative Plan-     Informed Consent- Anesthetic plan and risks discussed with patient

## 2018-07-11 NOTE — CONSULTS
UROLOGY CONSULTATION NOTE     Patient Identifiers: Guilherme Mancia (MRN [id number])  Service Requesting Consultation:  [facility]  Service Providing Consultation:  Urology, Gregg Becerra MD    Date of Service: 7/10/2018  Consults    Reason for Consultation:  Intractable right flank pain, nephrolithiasis    History of Present Illness:     Guilherme Mancia is a 25 y o  old with a history of nephrolithiasis, new onset with her 1st episode with pain since this past Friday  She came to the emergency room and was diagnosed with a distal, small stone and given medical expulsive therapy  She states that she did have some pain relief when she was here and got Toradol, however this pain returned and no oral medication was controlling this pain  She wishes to undergo intervention to treat her kidney stones she cannot tolerate the extreme discomfort that she is having  She has had poor p o  intake as an associated symptom and she has had some nausea as well  Apart from the above medications she denies aggravating alleviating factors and her previous work-up includes laboratory workup as well as imaging workup  Her urinalysis shows blood and some protein consistent with an obstructing stone but no signs of superimposed infection  The patient has not previously seen a urologist for this problem  The patient otherwise denies a history of urologic malignancy or malady  The patient endorses a family history of extensive stone disease in multiple members of her family  In fact, her father had hyperparathyroidism and required parathyroid gland removal to help treat his stone disease  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  Past Medical, Past Surgical History:   History reviewed  No pertinent past medical history :    History reviewed   No pertinent surgical history :    Medications, Allergies:     Current Facility-Administered Medications   Medication Dose Route Frequency    acetaminophen (TYLENOL) tablet 650 mg  650 mg Oral Q6H PRN    enoxaparin (LOVENOX) subcutaneous injection 40 mg  40 mg Subcutaneous Daily    HYDROmorphone (DILAUDID) injection 0 5 mg  0 5 mg Intravenous Q4H PRN    ondansetron (ZOFRAN) injection 4 mg  4 mg Intravenous Q6H PRN    oxyCODONE-acetaminophen (PERCOCET) 5-325 mg per tablet 2 tablet  2 tablet Oral Q6H PRN    sodium chloride 0 9 % infusion  125 mL/hr Intravenous Continuous       Allergies: Allergies   Allergen Reactions    Other     Pollen Extract    :    Social and Family History:   Social History:   Social History   Substance Use Topics    Smoking status: Never Smoker    Smokeless tobacco: Never Used    Alcohol use No        History   Smoking Status    Never Smoker   Smokeless Tobacco    Never Used       Family History:  History reviewed  No pertinent family history :     Review of Systems:     General: Fever, chills, or night sweats: negative  Cardiac: Negative for chest pain  Pulmonary: Negative for shortness of breath  Gastrointestinal: Abdominal pain positive  Nausea, vomiting, or diarrhea positive,  Genitourinary: See HPI above  Patient does not have hematuria  All other systems were queried and were negative except as listed above in HPI and here in the ROS  Physical Exam:   /78 (BP Location: Right arm)   Pulse 71   Temp (!) 97 4 °F (36 3 °C) (Tympanic)   Resp 16   Wt 86 1 kg (189 lb 13 1 oz)   LMP 06/15/2018   SpO2 99%   BMI 28 77 kg/m² Temp (24hrs), Av 5 °F (36 4 °C), Min:97 4 °F (36 3 °C), Max:97 5 °F (36 4 °C)  current; Temperature: (!) 97 4 °F (36 3 °C)  I/O last 24 hours: In: 3818 [P O :420; IV Piggyback:1000]  Out: 300 [Urine:300]  General: Patient is pleasant and in mild distress   Awake and alert, appears nontoxic    Cardiac: Peripheral edema: negative, peripheral pulses are present and indicated a regular rate and rhythm    Pulmonary: Non-labored breathing, speaking in full sentences, no wheezing, no coughing    Abdomen: Soft, non-tender, non-distended  No surgical scars  No masses, tenderness, hernias noted  Genitourinary: Positive CVA tenderness, negative suprapubic tenderness  Neurological: Cranial nerves II-XII are intact, no sensory deficits, no neurological deficits    Musculoskeletal: Extremities are warm and symmetrical, ROM is not assessed    Psychiatric: The patient's train of thought is linear and logical, mood and affect are normal,     Lymphatic: There is not adenopathy in the inguinal or abdominal or supraclavicular region  Skin:  Warm, no lesions    ROBIBNS: none        Labs:     Lab Results   Component Value Date    HGB 12 8 07/10/2018    HCT 39 8 07/10/2018    WBC 10 92 (H) 07/10/2018     07/10/2018   ]    Lab Results   Component Value Date     07/10/2018    K 3 7 07/10/2018     07/10/2018    CO2 27 07/10/2018    BUN 15 07/10/2018    CREATININE 1 08 07/10/2018    CALCIUM 9 0 07/10/2018    GLUCOSE 101 07/10/2018   ]    Imaging:   I personally reviewed the images and report of the following studies, and reviewed them with the patient:    CT Abdomen/Pelvis: CT scan the abdomen pelvis was reviewed by me personally, it shows a distal right stone by the ureterovesical junction with associated hydronephrosis on the right      ASSESSMENT:     25 y o  old female with  nephrolithiasis, intractable right flank pain, and desire for surgical intervention  I spoke with her about the epidemiology of stone disease, stone treatment options for a distal stone including cystoscopy, ureteral stent placement, and ureteroscopy with laser lithotripsy  She wishes to undergo ureteroscopy with laser lithotripsy  We did talk about the risks of ureteral stricture, need for additional procedures and risk of recurrence of stone disease      We also discussed the risks of infection, bleeding, pain, damage to surrounding structures, need for additional procedures, risk of failure of the procedure, risk of anesthesia, risk of positioning complications including neurapraxia, chronic pain, and paralysis as well as risk of rhabdomyolysis and compartment syndrome  Risk of deep venous thrombosis and venous thromboembolism as well as pneumonia and other potential unpredictable complications were also discussed with the patient  She wishes to proceed with surgery and informed consent will be obtained in the holding area tomorrow  PLAN:     Patient should be NPO after midnight in preparation for right ureteroscopy and laser lithotripsy of her obstructing distal ureteral stone  She will be marked and informed consent will be signed in the holding area tomorrow      Thank you for allowing me to participate in this patients care  Please do not hesitate to call with any additional questions    Nimesh Edge MD

## 2018-07-11 NOTE — H&P (VIEW-ONLY)
UROLOGY CONSULTATION NOTE     Patient Identifiers: Mick Coughlin (MRN [id number])  Service Requesting Consultation:  [facility]  Service Providing Consultation:  Urology, Mally Larson MD    Date of Service: 7/10/2018  Consults    Reason for Consultation:  Intractable right flank pain, nephrolithiasis    History of Present Illness:     Mick Coughlin is a 25 y o  old with a history of nephrolithiasis, new onset with her 1st episode with pain since this past Friday  She came to the emergency room and was diagnosed with a distal, small stone and given medical expulsive therapy  She states that she did have some pain relief when she was here and got Toradol, however this pain returned and no oral medication was controlling this pain  She wishes to undergo intervention to treat her kidney stones she cannot tolerate the extreme discomfort that she is having  She has had poor p o  intake as an associated symptom and she has had some nausea as well  Apart from the above medications she denies aggravating alleviating factors and her previous work-up includes laboratory workup as well as imaging workup  Her urinalysis shows blood and some protein consistent with an obstructing stone but no signs of superimposed infection  The patient has not previously seen a urologist for this problem  The patient otherwise denies a history of urologic malignancy or malady  The patient endorses a family history of extensive stone disease in multiple members of her family  In fact, her father had hyperparathyroidism and required parathyroid gland removal to help treat his stone disease  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  Past Medical, Past Surgical History:   History reviewed  No pertinent past medical history :    History reviewed   No pertinent surgical history :    Medications, Allergies:     Current Facility-Administered Medications   Medication Dose Route Frequency    acetaminophen (TYLENOL) tablet 650 mg  650 mg Oral Q6H PRN    enoxaparin (LOVENOX) subcutaneous injection 40 mg  40 mg Subcutaneous Daily    HYDROmorphone (DILAUDID) injection 0 5 mg  0 5 mg Intravenous Q4H PRN    ondansetron (ZOFRAN) injection 4 mg  4 mg Intravenous Q6H PRN    oxyCODONE-acetaminophen (PERCOCET) 5-325 mg per tablet 2 tablet  2 tablet Oral Q6H PRN    sodium chloride 0 9 % infusion  125 mL/hr Intravenous Continuous       Allergies: Allergies   Allergen Reactions    Other     Pollen Extract    :    Social and Family History:   Social History:   Social History   Substance Use Topics    Smoking status: Never Smoker    Smokeless tobacco: Never Used    Alcohol use No        History   Smoking Status    Never Smoker   Smokeless Tobacco    Never Used       Family History:  History reviewed  No pertinent family history :     Review of Systems:     General: Fever, chills, or night sweats: negative  Cardiac: Negative for chest pain  Pulmonary: Negative for shortness of breath  Gastrointestinal: Abdominal pain positive  Nausea, vomiting, or diarrhea positive,  Genitourinary: See HPI above  Patient does not have hematuria  All other systems were queried and were negative except as listed above in HPI and here in the ROS  Physical Exam:   /78 (BP Location: Right arm)   Pulse 71   Temp (!) 97 4 °F (36 3 °C) (Tympanic)   Resp 16   Wt 86 1 kg (189 lb 13 1 oz)   LMP 06/15/2018   SpO2 99%   BMI 28 77 kg/m² Temp (24hrs), Av 5 °F (36 4 °C), Min:97 4 °F (36 3 °C), Max:97 5 °F (36 4 °C)  current; Temperature: (!) 97 4 °F (36 3 °C)  I/O last 24 hours: In: 8647 [P O :420; IV Piggyback:1000]  Out: 300 [Urine:300]  General: Patient is pleasant and in mild distress   Awake and alert, appears nontoxic    Cardiac: Peripheral edema: negative, peripheral pulses are present and indicated a regular rate and rhythm    Pulmonary: Non-labored breathing, speaking in full sentences, no wheezing, no coughing    Abdomen: Soft, non-tender, non-distended  No surgical scars  No masses, tenderness, hernias noted  Genitourinary: Positive CVA tenderness, negative suprapubic tenderness  Neurological: Cranial nerves II-XII are intact, no sensory deficits, no neurological deficits    Musculoskeletal: Extremities are warm and symmetrical, ROM is not assessed    Psychiatric: The patient's train of thought is linear and logical, mood and affect are normal,     Lymphatic: There is not adenopathy in the inguinal or abdominal or supraclavicular region  Skin:  Warm, no lesions    ROBBINS: none        Labs:     Lab Results   Component Value Date    HGB 12 8 07/10/2018    HCT 39 8 07/10/2018    WBC 10 92 (H) 07/10/2018     07/10/2018   ]    Lab Results   Component Value Date     07/10/2018    K 3 7 07/10/2018     07/10/2018    CO2 27 07/10/2018    BUN 15 07/10/2018    CREATININE 1 08 07/10/2018    CALCIUM 9 0 07/10/2018    GLUCOSE 101 07/10/2018   ]    Imaging:   I personally reviewed the images and report of the following studies, and reviewed them with the patient:    CT Abdomen/Pelvis: CT scan the abdomen pelvis was reviewed by me personally, it shows a distal right stone by the ureterovesical junction with associated hydronephrosis on the right      ASSESSMENT:     25 y o  old female with  nephrolithiasis, intractable right flank pain, and desire for surgical intervention  I spoke with her about the epidemiology of stone disease, stone treatment options for a distal stone including cystoscopy, ureteral stent placement, and ureteroscopy with laser lithotripsy  She wishes to undergo ureteroscopy with laser lithotripsy  We did talk about the risks of ureteral stricture, need for additional procedures and risk of recurrence of stone disease      We also discussed the risks of infection, bleeding, pain, damage to surrounding structures, need for additional procedures, risk of failure of the procedure, risk of anesthesia, risk of positioning complications including neurapraxia, chronic pain, and paralysis as well as risk of rhabdomyolysis and compartment syndrome  Risk of deep venous thrombosis and venous thromboembolism as well as pneumonia and other potential unpredictable complications were also discussed with the patient  She wishes to proceed with surgery and informed consent will be obtained in the holding area tomorrow  PLAN:     Patient should be NPO after midnight in preparation for right ureteroscopy and laser lithotripsy of her obstructing distal ureteral stone  She will be marked and informed consent will be signed in the holding area tomorrow      Thank you for allowing me to participate in this patients care  Please do not hesitate to call with any additional questions    Uma Cox MD

## 2018-07-11 NOTE — CASE MANAGEMENT
Initial Clinical Review    Admission: Date/Time/Statement: 07/10/184@ 1249 Observation Written     Orders Placed This Encounter   Procedures    Place in Observation (expected length of stay for this patient is less than two midnights)     Standing Status:   Standing     Number of Occurrences:   1     Order Specific Question:   Admitting Physician     Answer:   Gloria Johnson [[id number]]     Order Specific Question:   Level of Care     Answer:   Med Surg [16]         ED: Date/Time/Mode of Arrival:   ED Arrival Information     Expected Arrival Acuity Means of Arrival Escorted By Service Admission Type    - 7/10/2018 09:51 Urgent Walk-In Family Member General Medicine Urgent    Arrival Complaint    Flank pain          Chief Complaint:   Chief Complaint   Patient presents with    Flank Pain     R flank pain around 2 weeks, came to ER last night, stone found  Pain has been worsening since return to home w/percocet not helping w/pain relief  History of Illness: Ingrid Dennis is a 25 y o  old with a history of nephrolithiasis, new onset with her 1st episode with pain since this past Friday  She came to the emergency room and was diagnosed with a distal, small stone and given medical expulsive therapy  She states that she did have some pain relief when she was here and got Toradol, however this pain returned and no oral medication was controlling this pain      She wishes to undergo intervention to treat her kidney stones she cannot tolerate the extreme discomfort that she is having  She has had poor p o  intake as an associated symptom and she has had some nausea as well       ED Vital Signs:   ED Triage Vitals   Temperature Pulse Respirations Blood Pressure SpO2   07/10/18 0957 07/10/18 0957 07/10/18 0957 07/10/18 0957 07/10/18 0957   97 5 °F (36 4 °C) 64 18 117/83 100 %      Temp Source Heart Rate Source Patient Position - Orthostatic VS BP Location FiO2 (%)   07/10/18 0957 07/10/18 1028 07/10/18 1028 07/10/18 1028 --   Oral Monitor Sitting Right arm       Pain Score       07/10/18 0957       Worst Possible Pain        Wt Readings from Last 1 Encounters:   07/10/18 86 1 kg (189 lb 13 1 oz)       Vital Signs (abnormal): temp 97 4    Abnormal Labs/Diagnostic Test Results:   WBC 10 92 (H)     Leukocytes, UA Trace     Protein,  (2+)     Ketones, UA 80 (3+)     Bilirubin, UA Interference- unable to analyze     Blood, UA Small       RBC, UA 4-10     WBC, UA 4-10     MUCOUS THREADS Occasional       CT Abdomen/Pelvis: shows a distal right stone by the ureterovesical junction with associated hydronephrosis on the right    ED Treatment:   Medication Administration from 07/10/2018 0951 to 07/10/2018 1325       Date/Time Order Dose Route Action     07/10/2018 1016 sodium chloride 0 9 % bolus 1,000 mL 1,000 mL Intravenous New Bag     07/10/2018 1023 HYDROmorphone (DILAUDID) injection 1 mg 1 mg Intravenous Given     07/10/2018 1125 HYDROmorphone (DILAUDID) injection 1 mg 1 mg Intravenous Given     07/10/2018 1314 tamsulosin (FLOMAX) capsule 0 4 mg 0 4 mg Oral Given          Past Medical/Surgical History: Active Ambulatory Problems     Diagnosis Date Noted    History of female infertility 07/06/2018     Resolved Ambulatory Problems     Diagnosis Date Noted    No Resolved Ambulatory Problems     No Additional Past Medical History       Admitting Diagnosis: Ureteric colic [Q04]  Flank pain [R10 9]  Right flank pain [R10 9]    Age/Sex: 25 y o  female    ASSESSMENT:      25 y o  old female with  nephrolithiasis, intractable right flank pain, and desire for surgical intervention      She wishes to proceed with surgery and informed consent will be obtained in the holding area tomorrow        PLAN:      Patient should be NPO after midnight in preparation for right ureteroscopy and laser lithotripsy of her obstructing distal ureteral stone      Admission Orders:  NPO  OR for CYSTOSCOPY URETEROSCOPY WITH LITHOTRIPSY HOLMIUM LASER, RETROGRADE PYELOGRAM AND INSERTION STENT URETERAL   CT pelvis  XR retrograde pyelogram  Foot pumps strain all urine  Consult urology    Scheduled Meds:   Current Facility-Administered Medications:  acetaminophen 650 mg Oral Q6H PRN   enoxaparin 40 mg Subcutaneous Daily   HYDROmorphone 0 5 mg Intravenous Q4H PRN   ondansetron 4 mg Intravenous Q6H PRN   oxyCODONE-acetaminophen 2 tablet Oral Q6H PRN   sodium chloride 125 mL/hr Intravenous Continuous     Continuous Infusions:   sodium chloride 125 mL/hr Last Rate: 125 mL/hr (07/11/18 0727)     PRN Meds:   acetaminophen    HYDROmorphone 0 5mg IV x1 thus far    [id number] IV x2 thus far    oxyCODONE-acetaminophen 2 tabs x2 thus far

## 2018-07-12 ENCOUNTER — TELEPHONE (OUTPATIENT)
Dept: UROLOGY | Facility: CLINIC | Age: 28
End: 2018-07-12

## 2018-07-12 DIAGNOSIS — N39.0 URINARY TRACT INFECTION WITHOUT HEMATURIA, SITE UNSPECIFIED: Primary | ICD-10-CM

## 2018-07-12 DIAGNOSIS — N20.0 KIDNEY STONE: Primary | ICD-10-CM

## 2018-07-12 RX ORDER — CIPROFLOXACIN 500 MG/1
500 TABLET, FILM COATED ORAL EVERY 12 HOURS SCHEDULED
Qty: 10 TABLET | Refills: 0 | Status: SHIPPED | OUTPATIENT
Start: 2018-07-12 | End: 2018-07-17

## 2018-07-12 NOTE — TELEPHONE ENCOUNTER
Received a page stating that patient was in severe pain  She states she felt as though she tugged on her stent and might have dislodged it  Not relieved by ibuprofen 800mg  Denies urinary incontinence or worsening hematuria  Discussed she should take flomax and if needed percocet although the flomax would benefit her more  Patient states she would try this but questioned removing the stent  Discussed this would not be ideal until Saturday but if need be her stent could be removed and educated patient on how to remove  Will call office in the AM to update

## 2018-07-12 NOTE — TELEPHONE ENCOUNTER
Pt s/p right ureteroscopic stone extraction, right stent insertion 07/11/2018 by Dr Poli Coffey  Pt called re follow up care  Spoke with pt and per Dr Poli Coffey, stent may be removed in 3-5 days  Pt  feels she can remove her own stent on Saturday  Warned pt she may experience right flank pain after stent removal, may take ibuprofen 600mg every 6 hours  Advised pt to hydrate well  Expect hematuria, urinary frequency, urgency and right flank discomfort until stent is removed  Discussed follow up appointment with pt  Pt scheduled for October 2018 with Jackson VILLATORO  Pt will obtain USK/KUB prior to visit  Orders mailed to pt  Pt has Cipro for 5 days

## 2018-07-12 NOTE — DISCHARGE SUMMARY
Discharge Summary - Primo Ramírez 25 y o  female MRN: [id number]    Unit/Bed#: [place] [facility] 87 210-01 Encounter: [id number]    Admission Date:   Admission Orders     Ordered        07/10/18 1249  Place in Observation (expected length of stay for this patient is less than two midnights)  Once               Admitting Diagnosis: Ureteric colic [J77]  Flank pain [R10 9]  Right flank pain [R10 9]          Procedures Performed: No orders of the defined types were placed in this encounter  Summary of Hospital Course: Primo Ramírez is a 25 y o  old with a history of nephrolithiasis, new onset with her 1st episode with pain since this past Friday  She came to the emergency room and was diagnosed with a distal, small stone and given medical expulsive therapy  She states that she did have some pain relief when she was here and got Toradol, however this pain returned and no oral medication was controlling this pain      She wishes to undergo intervention to treat her kidney stones she cannot tolerate the extreme discomfort that she is having  She has had poor p o  intake as an associated symptom and she has had some nausea as well  Apart from the above medications she denies aggravating alleviating factors and her previous work-up includes laboratory workup as well as imaging workup  Her urinalysis shows blood and some protein consistent with an obstructing stone but no signs of superimposed infection         The patient has not previously seen a urologist for this problem  The patient otherwise denies a history of urologic malignancy or malady  The patient endorses a family history of extensive stone disease in multiple members of her family  In fact, her father had hyperparathyroidism and required parathyroid gland removal to help treat his stone disease  Patient subsequently underwent cystoscopy urethroscopy with retrograde pyelogram and right ureteral stent insertion/stone extraction   Patient was told to follow-up with PCP in 4 days with urology in 3-5 days for stent extraction  Patient and  expressed well understanding there were also instructed to return to ED if any changes in bowel or urinary habits increasing abdominal flank pain or any other concerns including fever or chills generalized malaise  Patient tolerated procedure very well exam after procedure revealed following:    GENERAL APPEARANCE: WD/WN in NAD pleasant  SKIN: no rash  HEENT: NC/AT, PERRLA (B), moist MM, no epistaxis  NECK: Supple, no JVD    LUNGS: CTA (B) mildly prolonged expiratory phase,   no use of accessory muscles  HEART:          S1S 2, RRR  , PMI is not displaced  ABDOMEN: Soft, nontender, nondistended, +BS  Rectal exam:  EXTREMITIES: no edema   PERIPHERAL VASCULAR: palpable pulses   NEURO:  AAO x 3, CN 2-12: non focal  MUSCLE STRENGHT: 5/5 (B), SENSATION: nonfocal  DTR: ++, CEREBELLAR: non focal    Significant Findings, Care, Treatment and Services Provided: As per description    Complications: none    Discharge Diagnosis:      Post-Op Diagnosis Codes:     * Nephrolithiasis [N20 0]     * Ureteric colic [K97]     * Flank pain [R10 9]     * Right flank pain [R10 9]       Resolved Problems  Date Reviewed: 7/6/2018    None          Condition at Discharge: good         Discharge instructions/Information to patient and family:   See after visit summary for information provided to patient and family  Provisions for Follow-Up Care:  See after visit summary for information related to follow-up care and any pertinent home health orders  PCP: Dom Augustin DO    Disposition: Home    Planned Readmission: No    Discharge Statement   I spent 45 minutes discharging the patient  This time was spent on the day of discharge  I had direct contact with the patient on the day of discharge  Additional documentation is required if more than 30 minutes were spent on discharge       Discharge Medications:  See after visit summary for reconciled discharge medications provided to patient and family

## 2018-07-12 NOTE — TELEPHONE ENCOUNTER
Pt s/p right ureteroscopic stone extraction, right stent insertion with string 07/11/2018 by Dr Bernadette Powers  Pt called  asking about antibiotic following surgery  Pt was informed antibiotic was needed after surgery but none called in to her pharmacy  Sanjuana  Pt NKA  Pharmacy in chart    Thanks

## 2018-07-13 ENCOUNTER — TELEPHONE (OUTPATIENT)
Dept: UROLOGY | Facility: CLINIC | Age: 28
End: 2018-07-13

## 2018-07-13 NOTE — TELEPHONE ENCOUNTER
I called patient to inquire how she is doing  She stated that she removed her stent last evening and she feels 100% better

## 2018-07-13 NOTE — TELEPHONE ENCOUNTER
Pt s/p right ureteroscopic stone extraction, right stent insertion 07/11/2018 by Dr Deepti Chang  Pt called today to inform office she pulled out her stent last evening  Pt experiencing right flank pain, taking Percocet for relief  Pt requesting another prescription for Percocet for the weekend  Recommended to pt to try Ibuprofen 600mg every 6 hours for pain  Hydrate well with water  Follow up instructions mailed to pt  Pt verbalized understanding of instructions

## 2018-07-20 LAB
CA PHOS MFR STONE: 10 %
CALCIUM OXALATE DIHYDRATE MFR STONE IR: 5 %
COLOR STONE: NORMAL
COM MFR STONE: 85 %
COMMENT-STONE3: NORMAL
COMPOSITION: NORMAL
LABORATORY COMMENT REPORT: NORMAL
NIDUS STONE QL: NORMAL
PHOTO: NORMAL
SIZE STONE: NORMAL MM
STONE ANALYSIS-IMP: NORMAL
SURFACE CRYSTALS: NORMAL
WT STONE: 8.3 MG

## 2018-07-23 ENCOUNTER — TELEPHONE (OUTPATIENT)
Dept: UROLOGY | Facility: CLINIC | Age: 28
End: 2018-07-23

## 2018-07-23 DIAGNOSIS — N32.89 BLADDER SPASM: Primary | ICD-10-CM

## 2018-07-23 RX ORDER — OXYBUTYNIN CHLORIDE 5 MG/1
5 TABLET ORAL 3 TIMES DAILY PRN
Qty: 30 TABLET | Refills: 2 | Status: SHIPPED | OUTPATIENT
Start: 2018-07-23 | End: 2018-12-13

## 2018-07-23 NOTE — TELEPHONE ENCOUNTER
Pt s/p right ureteroscopic stone extraction, right stent insertion 07/11/2018 by Dr David Russell  Pt removed own stent the evening after her surgery due to stent being dislodged and causing incontinence  Pt called today stating she is experiencing bladder spasms during the day since stent removal  Spasms are improving each day but pt concerned because she leaves for [place] on Friday  [place]  Any thoughts?   Thanks

## 2018-07-23 NOTE — TELEPHONE ENCOUNTER
Order for oxybutynin sent to patient's [facility] pharmacy  Patient can take TID as needed for bladder spasms

## 2018-09-18 ENCOUNTER — TELEPHONE (OUTPATIENT)
Dept: UROLOGY | Facility: CLINIC | Age: 28
End: 2018-09-18

## 2018-09-18 NOTE — TELEPHONE ENCOUNTER
Patient managed by Dr Marilin Forbes, seen in [facility] office  Patient calling today, is scheduled for routine kidney stone follow up on 10/18/18  Patient concerned, reports she thinks she will have her period at time of appointment  Patient questions if appointment will need to be moved due to possibility of having her period  Instructed patient, appointment is to review imaging for kidney stones, appointment does not need to be moved  Patient verbalized understanding

## 2018-10-12 ENCOUNTER — HOSPITAL ENCOUNTER (OUTPATIENT)
Dept: ULTRASOUND IMAGING | Facility: MEDICAL CENTER | Age: 28
Discharge: HOME | End: 2018-10-12
Payer: COMMERCIAL

## 2018-10-12 DIAGNOSIS — N20.0 KIDNEY STONE: ICD-10-CM

## 2018-10-12 PROCEDURE — 76770 US EXAM ABDO BACK WALL COMP: CPT

## 2018-10-15 ENCOUNTER — APPOINTMENT (OUTPATIENT)
Dept: RADIOLOGY | Facility: MEDICAL CENTER | Age: 28
End: 2018-10-15
Payer: COMMERCIAL

## 2018-10-15 DIAGNOSIS — N20.0 KIDNEY STONE: ICD-10-CM

## 2018-10-15 PROCEDURE — 74018 RADEX ABDOMEN 1 VIEW: CPT

## 2018-10-17 NOTE — PROGRESS NOTES
10/18/2018      Chief Complaint   Patient presents with    Nephrolithiasis     follow       Assessment and Plan    25 y o  female managed by Dr Cruz Sis    1  Nephrolithiasis  - KUB 10/15/18 with no calculi   - U/S 10/12/18 with no calculi    - stone analysis 7/11/2018 revealed a predominantly calcium oxalate nephrolithiasis  - [facility] kidney stone education handout provided to the patient  - nephrology consultation offered to the patient, she is agreeable to proceeding  - will follow up with us in 1 year with a KUB and ultrasound prior      History of Present Illness  Jatin Huggins is a 25 y o  female here for follow up evaluation of nephrolithiasis  The patient was seen in the hospital 7/11/2018 and was found to have a 2 mm distal right ureteral calculi at the UVJ  She was unable to pass this and underwent ureteroscopy 7/11/2018  Stone analysis from the time of surgery revealed predominantly calcium oxalate nephrolithiasis  The patient has had no other kidney stones prior to this  She states that she does have a strong family history of kidney stones  Her father had thyroid issues which caused his  Review of Systems   Constitutional: Negative for activity change, chills and fever  Gastrointestinal: Negative for abdominal distention and abdominal pain  Musculoskeletal: Negative for back pain and gait problem  Psychiatric/Behavioral: Negative for behavioral problems and confusion  Urinary Incontinence Screening      Most Recent Value   Urinary Incontinence   Urinary Incontinence? No   Incomplete emptying? No   Urinary frequency? No   Urinary urgency? No   Urinary hesitancy? No   Dysuria (painful difficult urination)? No   Nocturia (waking up to use the bathroom)? No   Straining (having to push to go)? No   Weak stream?  No   Intermittent stream?  No   Post void dribbling? No          Past Medical History  No past medical history on file      Past Social History  Past Surgical History: Procedure Laterality Date    NE CYSTO/URETERO W/LITHOTRIPSY &INDWELL STENT INSRT Right 7/11/2018    Procedure: CYSTOSCOPY URETEROSCOPY; RETROGRADE PYELOGRAM AND INSERTION STENT URETERAL;  Surgeon: Jose Girard MD;  Location: [facility] OR;  Service: Urology     History   Smoking Status    Never Smoker   Smokeless Tobacco    Never Used       Past Family History  No family history on file  Past Social history  Social History     Social History    Marital status: /Civil Union     Spouse name: N/A    Number of children: N/A    Years of education: N/A     Occupational History    Not on file  Social History Main Topics    Smoking status: Never Smoker    Smokeless tobacco: Never Used    Alcohol use No    Drug use: No    Sexual activity: Not on file     Other Topics Concern    Not on file     Social History Narrative    No narrative on file       Current Medications  Current Outpatient Prescriptions   Medication Sig Dispense Refill    multivitamin (THERAGRAN) TABS Take 1 tablet by mouth daily      Probiotic Product (SOLUBLE FIBER/PROBIOTICS) CHEW Chew      ondansetron (ZOFRAN-ODT) 4 mg disintegrating tablet Take 1 tablet (4 mg total) by mouth every 8 (eight) hours as needed for nausea or vomiting for up to 7 days 20 tablet 0    oxybutynin (DITROPAN) 5 mg tablet Take 1 tablet (5 mg total) by mouth 3 (three) times a day as needed (PRN) (Patient not taking: Reported on 10/18/2018 ) 30 tablet 2    tamsulosin (FLOMAX) 0 4 mg Take 1 capsule (0 4 mg total) by mouth daily with dinner (Patient not taking: Reported on 10/18/2018 ) 30 capsule 0     No current facility-administered medications for this visit          Allergies  Allergies   Allergen Reactions    Other     Pollen Extract          The following portions of the patient's history were reviewed and updated as appropriate: allergies, current medications, past medical history, past social history, past surgical history and problem list       Vitals  Vitals:    10/18/18 0929   BP: 110/80   BP Location: Left arm   Patient Position: Sitting   Pulse: 98   Weight: 86 6 kg (191 lb)   Height: 5' 9" (1 753 m)           Physical Exam  Constitutional   General appearance: Patient is seated and in no acute distress, well appearing and well nourished  Head and Face   Head and face: Normal     Eyes   Conjunctiva and lids: No erythema, swelling or discharge  Ears, Nose, Mouth, and Throat   Hearing: Normal     Pulmonary   Respiratory effort: No increased work of breathing or signs of respiratory distress  Cardiovascular   Examination of extremities for edema and/or varicosities: Normal     Abdomen   Abdomen: Non-tender, no masses  Musculoskeletal   Gait and station: Normal     Skin   Skin and subcutaneous tissue: Warm, dry, and intact  No visible lesions or rashes  Psychiatric   Judgment and insight: Normal  Recent and remote memory:  Normal  Mood and affect: Normal      Results  No results found for this or any previous visit (from the past 1 hour(s))  ]  No results found for: PSA  Lab Results   Component Value Date    CALCIUM 8 6 07/11/2018     07/11/2018    K 3 6 07/11/2018    CO2 26 07/11/2018     07/11/2018    BUN 9 07/11/2018    CREATININE 0 78 07/11/2018     Lab Results   Component Value Date    WBC 8 33 07/11/2018    HGB 11 9 07/11/2018    HCT 37 8 07/11/2018    MCV 92 07/11/2018     07/11/2018       RENAL ULTRASOUND  INDICATION:   N20 0: Calculus of kidney  COMPARISON: CT scan dated July 6, 2018  TECHNIQUE:   Ultrasound of the retroperitoneum was performed with a curvilinear transducer utilizing volumetric sweeps and still imaging techniques       FINDINGS:     KIDNEYS:  Symmetric and normal size  Right kidney:  12 6 cm  Left kidney:  11 8 cm      Right kidney  Normal echogenicity and contour  No suspicious masses detected  No hydronephrosis  No shadowing calculi    No perinephric fluid collections      Left kidney  Normal echogenicity and contour  No suspicious masses detected  No hydronephrosis  No shadowing calculi  No perinephric fluid collections      URETERS:  Nonvisualized      BLADDER:   Normally distended  No focal thickening or mass lesions  Bilateral ureteral jets detected         IMPRESSION:  1  Normal         ABDOMEN XRAY   INDICATION:   N20 0: Calculus of kidney  COMPARISON:  Ultrasound 10/12/2018  VIEWS:  AP supine     FINDINGS:     No radiopaque renal calculi seen      No radiopaque ureteral calculi identified      Nonobstructive bowel gas pattern      No acute osseous abnormality is seen      IMPRESSION:  1  No radiopaque urinary tract calculi  Orders  Orders Placed This Encounter   Procedures    XR abdomen 1 view kub     Standing Status:   Future     Standing Expiration Date:   10/18/2022     Scheduling Instructions:      Bring along any outside films relating to this procedure  Order Specific Question:   Reason for Exam:     Answer:   calc     Order Specific Question:   Is the patient pregnant? Answer:   Unknown    US kidney and bladder     Standing Status:   Future     Standing Expiration Date:   10/18/2022     Scheduling Instructions:      "Prep required if being scheduled in conjunction with other studies, refer to those examination's Preps first before scheduling  All patients for US Kidney and Bladder they must drink 24 oz of water 60 minutes before your scheduled appointment time  This test requires you to have a FULL bladder  Please do not urinate before your test             Please bring your physician order, insurance cards, a form of photo ID and a list of your medications with you  Arrive 15 minutes prior to your appointment time in order to register  If you need to have lab work or a urinalysis, please do this AFTER your ultrasound  "            To schedule this appointment, please contact Central Scheduling at [phone]       Order Specific Question:   Reason for Exam:     Answer:   calc     Order Specific Question:   Is the patient pregnant?      Answer:   Unknown    Ambulatory referral to Nephrology     Standing Status:   Future     Standing Expiration Date:   4/18/2019     Referral Priority:   Routine     Referral Type:   Consult - AMB     Referral Reason:   Specialty Services Required     Requested Specialty:   Nephrology     Number of Visits Requested:   1     Expiration Date:   10/18/2019

## 2018-10-18 ENCOUNTER — OFFICE VISIT (OUTPATIENT)
Dept: UROLOGY | Facility: CLINIC | Age: 28
End: 2018-10-18
Payer: COMMERCIAL

## 2018-10-18 VITALS
SYSTOLIC BLOOD PRESSURE: 110 MMHG | WEIGHT: 191 LBS | BODY MASS INDEX: 28.29 KG/M2 | DIASTOLIC BLOOD PRESSURE: 80 MMHG | HEART RATE: 98 BPM | HEIGHT: 69 IN

## 2018-10-18 DIAGNOSIS — N20.0 KIDNEY CALCULI: Primary | ICD-10-CM

## 2018-10-18 PROCEDURE — 99213 OFFICE O/P EST LOW 20 MIN: CPT | Performed by: PHYSICIAN ASSISTANT

## 2018-10-18 RX ORDER — DIPHENOXYLATE HYDROCHLORIDE AND ATROPINE SULFATE 2.5; .025 MG/1; MG/1
1 TABLET ORAL DAILY
COMMUNITY

## 2018-11-06 ENCOUNTER — OFFICE VISIT (OUTPATIENT)
Dept: NEPHROLOGY | Facility: CLINIC | Age: 28
End: 2018-11-06
Payer: COMMERCIAL

## 2018-11-06 VITALS
WEIGHT: 192.6 LBS | DIASTOLIC BLOOD PRESSURE: 80 MMHG | HEIGHT: 69 IN | HEART RATE: 80 BPM | SYSTOLIC BLOOD PRESSURE: 118 MMHG | BODY MASS INDEX: 28.53 KG/M2

## 2018-11-06 DIAGNOSIS — N20.0 KIDNEY CALCULI: ICD-10-CM

## 2018-11-06 DIAGNOSIS — N20.0 NEPHROLITHIASIS: Primary | ICD-10-CM

## 2018-11-06 LAB
SL AMB  POCT GLUCOSE, UA: NORMAL
SL AMB LEUKOCYTE ESTERASE,UA: NORMAL
SL AMB POCT BILIRUBIN,UA: NORMAL
SL AMB POCT BLOOD,UA: NORMAL
SL AMB POCT CLARITY,UA: CLEAR
SL AMB POCT COLOR,UA: YELLOW
SL AMB POCT KETONES,UA: NORMAL
SL AMB POCT NITRITE,UA: NORMAL
SL AMB POCT PH,UA: 6.5
SL AMB POCT SPECIFIC GRAVITY,UA: 1.02
SL AMB POCT URINE PROTEIN: NORMAL
SL AMB POCT UROBILINOGEN: 0.2

## 2018-11-06 PROCEDURE — 81002 URINALYSIS NONAUTO W/O SCOPE: CPT | Performed by: INTERNAL MEDICINE

## 2018-11-06 PROCEDURE — 99214 OFFICE O/P EST MOD 30 MIN: CPT | Performed by: INTERNAL MEDICINE

## 2018-11-06 NOTE — PATIENT INSTRUCTIONS
YOU ARE HERE FOR YOUR INITIAL VISIT TO HELP PREVENT KIDNEY STONES   YOU EXPERIENCED HER 1ST AND ONLY EVENT THIS SUMMER  YOUR RECENT IMAGING STUDIES SHOWED NO ACTIVE STONE DISEASE  WE ARE GOING TO START WITH A 24 HOUR URINE COLLECTION TO ASSESS COULD THE RISK FOR STONE FORMATION IN THE FUTURE AND AFTER THAT WE CAN DISCUSS TREATMENT OPTIONS

## 2018-11-06 NOTE — PROGRESS NOTES
Consultation - Nephrology   Lance Walker 25 y o  female MRN: [id number]  Unit/Bed#:  Encounter: [id number]      Assessment/Plan     Assessment / Plan:  1  Nephrolithiasis    The patient has had 1 kidney stone event clinically when she passed a stone this summer  She has no medical problems but has a strong family history of kidney stone disease  Her renal function is normal and her kidney stone was analyzed to be calcium oxalate  We discussed the risk factors and pathophysiology of kidney stone formation  Were just going to start the initial evaluation with a 24 hour urine stone risk in the meantime I have told her to drink plenty fluids make sure she has over couple L of urine  Her most recent imaging studies show no signs of stone at the present time  I will contact her with results to discuss treatment options and follow-up once the results are back  History of Present Illness   Physician Requesting Consult: No att  providers found  Reason for Consult / Principal Problem:   Nephrolithiasis  Hx and PE limited by:   HPI: Lance Walker is a 25y o  year old female who presents for evaluation  She developed a kidney stone which was actually obstructed required ureteroscopy for removal   She was recommended to be evaluated because there was a very strong family history of kidney stones in her father and sister so she is here for initial evaluation with no complaints today  History obtained from chart review and from patient  Consults    Review of Systems   Constitutional: Negative  HENT: Negative  Eyes: Negative  Respiratory: Negative  Cardiovascular: Negative  Gastrointestinal: Negative  Negative for abdominal distention and diarrhea  Genitourinary: Negative  Negative for dysuria and hematuria  Musculoskeletal: Negative  Neurological: Negative          Historical Information   Patient Active Problem List   Diagnosis    History of female infertility    Flank pain    Nephrolithiasis    Renal colic    Ureteric colic    Right flank pain     Past Medical History:   Diagnosis Date    Kidney stone      Past Surgical History:   Procedure Laterality Date    [place] CYSTO/URETERO W/LITHOTRIPSY &INDWELL STENT INSRT Right 7/11/2018    Procedure: CYSTOSCOPY URETEROSCOPY; RETROGRADE PYELOGRAM AND INSERTION STENT URETERAL;  Surgeon: Rita Douglas MD;  Location: [facility] OR;  Service: Urology     Social History   History   Alcohol Use No     History   Drug Use No     History   Smoking Status    Never Smoker   Smokeless Tobacco    Never Used     Family History   Problem Relation Age of Onset    Nephrolithiasis Father        Meds/Allergies   current meds:   No current facility-administered medications for this visit  Allergies   Allergen Reactions    Other     Pollen Extract        Objective   [unfilled]  Body mass index is 28 44 kg/m²  Invasive Devices:        PHYSICAL EXAM:  /80 (BP Location: Right arm, Patient Position: Sitting, Cuff Size: Standard)   Pulse 80   Ht 5' 9" (1 753 m)   Wt 87 4 kg (192 lb 9 6 oz)   BMI 28 44 kg/m²     Physical Exam   Constitutional: She appears well-nourished  No distress  HENT:   Mouth/Throat: No oropharyngeal exudate  Eyes: Pupils are equal, round, and reactive to light  Conjunctivae are normal  No scleral icterus  Neck: Neck supple  No JVD present  Cardiovascular: Normal rate and regular rhythm  Exam reveals no friction rub  No edema  Pulmonary/Chest: Effort normal and breath sounds normal  No respiratory distress  She has no wheezes  She has no rales  Abdominal: Soft  Bowel sounds are normal  She exhibits no distension  There is no tenderness  There is no rebound  Neurological: She is alert           Current Weight: Weight - Scale: 87 4 kg (192 lb 9 6 oz)  First Weight: Weight - Scale: 87 4 kg (192 lb 9 6 oz)    Lab Results:              Invalid input(s): LABGLOM        Invalid input(s): LABALBU

## 2018-11-06 NOTE — LETTER
November 6, 2018     Sharon Long MD  [address]  [address]    Patient: Yeison De La Rosa   YOB: 1990   Date of Visit: 11/6/2018       Dear Dr Luis Antonio Gorman:    Thank you for referring Yeison De La Rosa to me for evaluation  Below are my notes for this consultation  If you have questions, please do not hesitate to call me  I look forward to following your patient along with you  Sincerely,        Charolett Frankel, MD        CC: No Recipients  Charolett Frankel, MD  11/6/2018  3:06 PM  Sign at close encounter  Consultation - Nephrology   Yeison De La Rosa 25 y o  female MRN: [id number]  Unit/Bed#:  Encounter: [id number]      Assessment/Plan     Assessment / Plan:  1  Nephrolithiasis    The patient has had 1 kidney stone event clinically when she passed a stone this summer  She has no medical problems but has a strong family history of kidney stone disease  Her renal function is normal and her kidney stone was analyzed to be calcium oxalate  We discussed the risk factors and pathophysiology of kidney stone formation  Were just going to start the initial evaluation with a 24 hour urine stone risk in the meantime I have told her to drink plenty fluids make sure she has over couple L of urine  Her most recent imaging studies show no signs of stone at the present time  I will contact her with results to discuss treatment options and follow-up once the results are back  History of Present Illness   Physician Requesting Consult: No att  providers found  Reason for Consult / Principal Problem:   Nephrolithiasis  Hx and PE limited by:   HPI: Yeison De La Rosa is a 25y o  year old female who presents for evaluation    She developed a kidney stone which was actually obstructed required ureteroscopy for removal   She was recommended to be evaluated because there was a very strong family history of kidney stones in her father and sister so she is here for initial evaluation with no complaints today  History obtained from chart review and from patient  Consults    Review of Systems   Constitutional: Negative  HENT: Negative  Eyes: Negative  Respiratory: Negative  Cardiovascular: Negative  Gastrointestinal: Negative  Negative for abdominal distention and diarrhea  Genitourinary: Negative  Negative for dysuria and hematuria  Musculoskeletal: Negative  Neurological: Negative  Historical Information   Patient Active Problem List   Diagnosis    History of female infertility    Flank pain    Nephrolithiasis    Renal colic    Ureteric colic    Right flank pain     Past Medical History:   Diagnosis Date    Kidney stone      Past Surgical History:   Procedure Laterality Date    TX CYSTO/URETERO W/LITHOTRIPSY &INDWELL STENT INSRT Right 7/11/2018    Procedure: CYSTOSCOPY URETEROSCOPY; RETROGRADE PYELOGRAM AND INSERTION STENT URETERAL;  Surgeon: Duke Arnett MD;  Location: [facility];  Service: Urology     Social History   History   Alcohol Use No     History   Drug Use No     History   Smoking Status    Never Smoker   Smokeless Tobacco    Never Used     Family History   Problem Relation Age of Onset    Nephrolithiasis Father        Meds/Allergies   current meds:   No current facility-administered medications for this visit  Allergies   Allergen Reactions    Other     Pollen Extract        Objective   [unfilled]  Body mass index is 28 44 kg/m²  Invasive Devices:        PHYSICAL EXAM:  /80 (BP Location: Right arm, Patient Position: Sitting, Cuff Size: Standard)   Pulse 80   Ht 5' 9" (1 753 m)   Wt 87 4 kg (192 lb 9 6 oz)   BMI 28 44 kg/m²      Physical Exam   Constitutional: She appears well-nourished  No distress  HENT:   Mouth/Throat: No oropharyngeal exudate  Eyes: Pupils are equal, round, and reactive to light  Conjunctivae are normal  No scleral icterus  Neck: Neck supple  No JVD present     Cardiovascular: Normal rate and regular rhythm  Exam reveals no friction rub  No edema  Pulmonary/Chest: Effort normal and breath sounds normal  No respiratory distress  She has no wheezes  She has no rales  Abdominal: Soft  Bowel sounds are normal  She exhibits no distension  There is no tenderness  There is no rebound  Neurological: She is alert           Current Weight: Weight - Scale: 87 4 kg (192 lb 9 6 oz)  First Weight: Weight - Scale: 87 4 kg (192 lb 9 6 oz)    Lab Results:              Invalid input(s): LABGLOM        Invalid input(s): LABALBU

## 2018-12-03 ENCOUNTER — TELEPHONE (OUTPATIENT)
Dept: NEPHROLOGY | Facility: CLINIC | Age: 28
End: 2018-12-03

## 2018-12-13 ENCOUNTER — OFFICE VISIT (OUTPATIENT)
Dept: FAMILY MEDICINE CLINIC | Facility: CLINIC | Age: 28
End: 2018-12-13
Payer: COMMERCIAL

## 2018-12-13 VITALS
TEMPERATURE: 98.3 F | DIASTOLIC BLOOD PRESSURE: 80 MMHG | BODY MASS INDEX: 28.94 KG/M2 | WEIGHT: 196 LBS | SYSTOLIC BLOOD PRESSURE: 122 MMHG

## 2018-12-13 DIAGNOSIS — J01.00 ACUTE NON-RECURRENT MAXILLARY SINUSITIS: Primary | ICD-10-CM

## 2018-12-13 DIAGNOSIS — J02.9 SORE THROAT: ICD-10-CM

## 2018-12-13 LAB — S PYO AG THROAT QL: NEGATIVE

## 2018-12-13 PROCEDURE — 87880 STREP A ASSAY W/OPTIC: CPT | Performed by: FAMILY MEDICINE

## 2018-12-13 PROCEDURE — 99213 OFFICE O/P EST LOW 20 MIN: CPT | Performed by: FAMILY MEDICINE

## 2018-12-13 PROCEDURE — 1036F TOBACCO NON-USER: CPT | Performed by: FAMILY MEDICINE

## 2018-12-13 RX ORDER — AZITHROMYCIN 250 MG/1
TABLET, FILM COATED ORAL
Qty: 6 TABLET | Refills: 0 | Status: SHIPPED | OUTPATIENT
Start: 2018-12-13 | End: 2018-12-20

## 2018-12-13 RX ORDER — IPRATROPIUM BROMIDE 21 UG/1
2 SPRAY, METERED NASAL EVERY 12 HOURS
Qty: 30 ML | Refills: 0 | Status: SHIPPED | OUTPATIENT
Start: 2018-12-13 | End: 2020-06-09

## 2018-12-13 NOTE — PROGRESS NOTES
Assessment/Plan:  Side effect profile medication reviewed with patient  Recommend return to office if no improvement or worsening symptoms  No problem-specific Assessment & Plan notes found for this encounter  Diagnoses and all orders for this visit:    Acute non-recurrent maxillary sinusitis  -     azithromycin (ZITHROMAX) 250 mg tablet; Take 2 tablets today then 1 tablet daily x 4 days  -     ipratropium (ATROVENT) 0 03 % nasal spray; 2 sprays into each nostril every 12 (twelve) hours    Sore throat  -     POCT rapid strepA  -     azithromycin (ZITHROMAX) 250 mg tablet; Take 2 tablets today then 1 tablet daily x 4 days  -     ipratropium (ATROVENT) 0 03 % nasal spray; 2 sprays into each nostril every 12 (twelve) hours          Subjective:      Patient ID: Boby Briggs is a 25 y o  female  Patient with sinus pressure and congestion over the last 1 week  Symptoms are mild-to-moderate  No GI complaints  Denies any coughing  Sore Throat      Fatigue   Associated symptoms include fatigue and a sore throat  The following portions of the patient's history were reviewed and updated as appropriate: allergies, current medications, past family history, past medical history, past social history, past surgical history and problem list     Review of Systems   Constitutional: Positive for fatigue  HENT: Positive for sore throat  Eyes: Negative  Respiratory: Negative  Cardiovascular: Negative  Gastrointestinal: Negative  Endocrine: Negative  Genitourinary: Negative  Musculoskeletal: Negative  Skin: Negative  Allergic/Immunologic: Negative  Neurological: Negative  Hematological: Negative  Psychiatric/Behavioral: Negative  Objective:      /80   Temp 98 3 °F (36 8 °C)   Wt 88 9 kg (196 lb)   BMI 28 94 kg/m²          Physical Exam   Constitutional: She is oriented to person, place, and time  She appears well-developed and well-nourished     HENT:   Head: Normocephalic and atraumatic  Right Ear: External ear normal  Tympanic membrane is not erythematous and not bulging  Left Ear: External ear normal  Tympanic membrane is not erythematous and not bulging  Nose: Nose normal    Mouth/Throat: Oropharynx is clear and moist and mucous membranes are normal  No oral lesions  No oropharyngeal exudate  Eyes: Conjunctivae and EOM are normal  Right eye exhibits no discharge  Left eye exhibits no discharge  No scleral icterus  Neck: Normal range of motion  Neck supple  No thyromegaly present  Cardiovascular: Normal rate, regular rhythm and normal heart sounds  Exam reveals no gallop and no friction rub  No murmur heard  Pulmonary/Chest: Effort normal  No respiratory distress  She has no wheezes  She has no rales  She exhibits no tenderness  Abdominal: Soft  Bowel sounds are normal  She exhibits no distension and no mass  There is no tenderness  There is no rebound and no guarding  Musculoskeletal: Normal range of motion  She exhibits no edema, tenderness or deformity  Lymphadenopathy:     She has no cervical adenopathy  Neurological: She is alert and oriented to person, place, and time  She has normal reflexes  No cranial nerve deficit  She exhibits normal muscle tone  Coordination normal    Skin: Skin is warm and dry  No rash noted  No erythema  No pallor  Psychiatric: She has a normal mood and affect  Her behavior is normal    Vitals reviewed

## 2018-12-17 ENCOUNTER — TELEPHONE (OUTPATIENT)
Dept: FAMILY MEDICINE CLINIC | Facility: CLINIC | Age: 28
End: 2018-12-17

## 2018-12-17 DIAGNOSIS — J40 BRONCHITIS: Primary | ICD-10-CM

## 2018-12-17 RX ORDER — CEFUROXIME AXETIL 250 MG/1
250 TABLET ORAL EVERY 12 HOURS SCHEDULED
Qty: 14 TABLET | Refills: 0 | Status: SHIPPED | OUTPATIENT
Start: 2018-12-17 | End: 2018-12-24

## 2018-12-17 NOTE — TELEPHONE ENCOUNTER
Pt called - she was in for an appt on 12/13, and you prescribed a z-zain and atrovent  Pt finished the last pill of the ABX and states that she is no better, and is asking if you can call something else in, as the z- zain had no effect  Please advise  Thank you

## 2019-01-13 DIAGNOSIS — J01.00 ACUTE NON-RECURRENT MAXILLARY SINUSITIS: ICD-10-CM

## 2019-01-13 DIAGNOSIS — J02.9 SORE THROAT: ICD-10-CM

## 2019-01-15 RX ORDER — IPRATROPIUM BROMIDE 21 UG/1
SPRAY, METERED NASAL
Refills: 0 | OUTPATIENT
Start: 2019-01-15

## 2019-02-10 ENCOUNTER — TELEPHONE (OUTPATIENT)
Dept: OTHER | Facility: OTHER | Age: 29
End: 2019-02-10

## 2019-02-10 NOTE — TELEPHONE ENCOUNTER
Boby Briggs 1990  CONFIDENTIALTY NOTICE: This fax transmission is intended only for the addressee  It contains information that is legally privileged,  confidential or otherwise protected from use or disclosure  If you are not the intended recipient, you are strictly prohibited from reviewing,  disclosing, copying using or disseminating any of this information or taking any action in reliance on or regarding this information  If you have  received this fax in error, please notify us immediately by telephone so that we can arrange for its return to us  Page: 1 of 3  Call Id: [id number]  Health Call  Standard Call Report  Health Call  Patient Name: Boby Briggs  Gender: Female  : 1990  Age: 25 Y 6 M 3 D  Return Phone  Number: [phone] (Home)  Address: [place] [address]  City/State/Zip: [address] [place] [address]  Practice Name: Sarah THOMSON Pastora Schultz Charged:  Physician:  [facility] Name:  Relationship To  Patient: Self  Return Phone Number: [phone] (Home)  Presenting Problem: "My  was just diagnosed with  the flu and I was wondering if I could  get TamiFlu called in "  Service Type: Triage  Charged Service 1: N/A  Pharmacy Name and  Number:  Nurse Assessment  Nurse: Katrin Calles Date/Time: 2/10/2019 10:43:57 AM  Type of assessment required:  ---General (Adult or Child)  Duration of Current S/S  ---Started Saturday  Location/Radiation  ---Respiratory  Temperature (F) and route:  ---Denies Fever  Symptom Specific Meds (Dose/Time):  ---None  Other S/S  ---Runny nose, nasal congestion, headache, dry cough but denies difficulty breathing,  and generalized fatigue   diagnosed with the flu this morning  Pain Scale on scale of 1-10, 10 being the worst:  ---Headache pain 5 out of 10  Symptom progression:  ---worse  Intake and Output  Boby Briggs 1990  CONFIDENTIALTY NOTICE: This fax transmission is intended only for the addressee   It contains information that is legally privileged,  confidential or otherwise protected from use or disclosure  If you are not the intended recipient, you are strictly prohibited from reviewing,  disclosing, copying using or disseminating any of this information or taking any action in reliance on or regarding this information  If you have  received this fax in error, please notify us immediately by telephone so that we can arrange for its return to us  Page: 2 of 3  Call Id: [id number]  Nurse Assessment  ---WNL  LMP/ Pregnancy:  ---Denies pregnancy  Breastfeeding  ---No  Last Exam/Treatment:  ---12/2018 for sinus infection  Protocols  Protocol Title Nurse Date/Time  Influenza - Seasonal Tyler Garcia RN, [facility] Listen 2/10/2019 10:52:17 AM  Question Caller Affirmed  Disp  Time Disposition Final User  2/10/2019 10:53:47 AM Call PCP within 24 Hours Jeffry Tesfaye  2/10/2019 10:56:08 AM RN Triaged Yes Tyler Garcia RN, [facility] Advice Given Per Protocol  CALL PCP WITHIN 24 HOURS: You need to discuss this with your doctor within the next 24 hours  * IF OFFICE WILL BE OPEN:  Call the office when it opens tomorrow morning  PRESCRIPTION ANTIVIRAL DRUGS FOR INFLUENZA: * Antiviral drugs (such  as Tamiflu) must be started within 48 hours of the start of flu symptoms to be helpful  If the flu symptoms or fever started more than  48 hours ago, they are not useful  Adults who are at [address] [place] [address] because of major illnesses may benefit from starting an antiviral drug  more than 48 hours after start of flu symptoms  * Most healthy adults do not need an antiviral drug  The benefits of taking an antiviral  are limited  If started within 48 hours, they may reduce the time you are sick by 1 to 1 5 days  They improve the symptoms, but do not  eliminate them  * Healthy adults who already improving do not need antiviral treatment  INFLUENZA - GENERAL CARE ADVICE  * Cough: Use cough drops  * Feeling dehydrated: Drink extra liquids  If the air in your home is dry, use a humidifier   * Fever: For  fever over 101 F (38 3 C), take acetaminophen every 4-6 hours (Adults 650 mg) OR ibuprofen every 6-8 hours (Adults 400-600 mg)  *  Muscle aches, headache, and other pains: Often this comes and goes with the fever  Take acetaminophen every 4-6 hours (Adults 650  mg) OR ibuprofen every 6-8 hours (Adults 400-600 mg)  * Sore throat: Try throat lozenges, hard candy or warm chicken broth  PAIN  MEDICINES: * For pain relief, take acetaminophen, ibuprofen, or naproxen  * Use the lowest amount that makes your pain feel better  ACETAMINOPHEN (E G , TYLENOL): * Take 650 mg (two 325 mg pills) by mouth every 4-6 hours as needed  Each Regular Strength  Tylenol pill has 325 mg of acetaminophen  The most you should take each day is 3,250 mg (10 Regular Strength pills a day)  * Another  choice is to take 1,000 mg (two 500 mg pills) every 8 hours as needed  Each Extra Strength Tylenol pill has 500 mg of acetaminophen  The most you should take each day is 3,000 mg (6 Extra Strength pills a day)  NO ASPIRIN: Do not use aspirin for treatment of fever  or pain (Reason: there is an association between influenza and Naaman Fillers' Syndrome)  FOR A RUNNY NOSE - BLOW YOUR NOSE: *  Nasal mucus and discharge help wash viruses and bacteria out of the nose and sinuses  * Blowing your nose helps clean out your nose  Use a handkerchief or a paper tissue  * If the skin around your nostrils gets irritated, apply a tiny amount of petroleum ointment to the  nasal openings once or twice a day  FOR A STUFFY NOSE - USE NASAL WASHES: * Introduction: Saline (salt water) nasal irrigation  (nasal wash) is an effective and simple home remedy for treating stuffy nose and sinus congestion  The nose can be irrigated by pouring,  spraying, or squirting salt water into the nose and then letting it run back out  * How it Helps:  The salt water rinses out excess mucus,  washes out any irritants (dust, allergens) that might be present, and moistens the nasal cavity  * Methods: There are several ways to  perform nasal irrigation  You can use a saline nasal spray bottle (available over-the-counter), a rubber ear syringe, a medical syringe  Manuela Joy 1990  CONFIDENTIALTY NOTICE: This fax transmission is intended only for the addressee  It contains information that is legally privileged,  confidential or otherwise protected from use or disclosure  If you are not the intended recipient, you are strictly prohibited from reviewing,  disclosing, copying using or disseminating any of this information or taking any action in reliance on or regarding this information  If you have  received this fax in error, please notify us immediately by telephone so that we can arrange for its return to us  Page: 3 of 3  Call Id: [phone]  Care Advice Given Per Protocol  without the needle, or a NETI POT  COUGHING SPELLS: * Drink warm fluids  Inhale warm mist  (Reason: both relax the airway and  loosen up the phlegm) * Suck on cough drops or hard candy to coat the irritated throat  CALL BACK IF: * Fever lasts over 3 days *  Runny nose lasts over 10 days * Cough lasts over 3 weeks * Difficulty breathing occurs * You become worse  CARE ADVICE given per  INFLUENZA - SEASONAL (Adult) guideline  Caller Understands: Yes  Caller Disagree/Comply: Comply  PreDisposition: Unsure  Comments  User: Joanna De La Garza RN Date/Time: 2/10/2019 10:55:14 AM  Spoke with patient  Patient would like for Tamiflu to be called in  Informed patient that it is best to be tested for the flu before  starting antiviral medication  Patient stated that she would go to [facility]

## 2019-02-11 ENCOUNTER — TELEPHONE (OUTPATIENT)
Dept: FAMILY MEDICINE CLINIC | Facility: CLINIC | Age: 29
End: 2019-02-11

## 2019-02-11 DIAGNOSIS — J11.1 INFLUENZA: Primary | ICD-10-CM

## 2019-02-11 RX ORDER — OSELTAMIVIR PHOSPHATE 75 MG/1
75 CAPSULE ORAL EVERY 12 HOURS SCHEDULED
Qty: 10 CAPSULE | Refills: 0 | Status: SHIPPED | OUTPATIENT
Start: 2019-02-11 | End: 2019-02-16

## 2019-02-11 NOTE — TELEPHONE ENCOUNTER
Pt called this morning and spoke with [facility], there is a separate task for that  Forwarding this to you as a follow up

## 2019-02-11 NOTE — TELEPHONE ENCOUNTER
Pt called again about this and states that her daughter was also dx w/flu  She is asking for a phone call when this is called in to the pharmacy

## 2019-02-11 NOTE — TELEPHONE ENCOUNTER
Juvenal Dillon calling because her  has been diagnosed with the flu  She is also taking her daughter to the doctor with the same symptoms  She is wondering if you would be able to send Tamiflu into the [facility] in [place] for her so she can start taking it?

## 2019-02-25 ENCOUNTER — OFFICE VISIT (OUTPATIENT)
Dept: URGENT CARE | Facility: MEDICAL CENTER | Age: 29
End: 2019-02-25
Payer: COMMERCIAL

## 2019-02-25 VITALS
TEMPERATURE: 98.2 F | BODY MASS INDEX: 28.17 KG/M2 | WEIGHT: 190.2 LBS | SYSTOLIC BLOOD PRESSURE: 140 MMHG | OXYGEN SATURATION: 98 % | DIASTOLIC BLOOD PRESSURE: 77 MMHG | RESPIRATION RATE: 16 BRPM | HEART RATE: 115 BPM | HEIGHT: 69 IN

## 2019-02-25 DIAGNOSIS — G44.209 TENSION HEADACHE: Primary | ICD-10-CM

## 2019-02-25 PROCEDURE — 99213 OFFICE O/P EST LOW 20 MIN: CPT | Performed by: FAMILY MEDICINE

## 2019-02-25 RX ORDER — CICLOPIROX 1 G/100ML
SHAMPOO TOPICAL
Refills: 1 | COMMUNITY
Start: 2019-02-04

## 2019-02-25 RX ORDER — KETOROLAC TROMETHAMINE 30 MG/ML
60 INJECTION, SOLUTION INTRAMUSCULAR; INTRAVENOUS ONCE
Status: COMPLETED | OUTPATIENT
Start: 2019-02-25 | End: 2019-02-25

## 2019-02-25 RX ORDER — CYCLOBENZAPRINE HCL 5 MG
10 TABLET ORAL
Qty: 10 TABLET | Refills: 1 | Status: SHIPPED | OUTPATIENT
Start: 2019-02-25 | End: 2020-06-09

## 2019-02-25 RX ADMIN — KETOROLAC TROMETHAMINE 60 MG: 30 INJECTION, SOLUTION INTRAMUSCULAR; INTRAVENOUS at 10:33

## 2019-02-25 NOTE — PROGRESS NOTES
[facility]        NAME: Lance Walker is a 25 y o  female  : 1990    MRN: [id number]  DATE: 2019  TIME: 10:32 AM    Assessment and Plan   Tension headache [G44 209]  1  Tension headache  ketorolac (TORADOL) injection 60 mg    cyclobenzaprine (FLEXERIL) 5 mg tablet         Patient Instructions       Follow up with PCP in 3-5 days  Proceed to  ER if symptoms worsen  Chief Complaint     Chief Complaint   Patient presents with    Headache     Patient relates started with a "tension headache" to back of her head since last Wednesday  Denies fever  Denies head injury  Denies nausea and vomiting  History of Present Illness       Patient complaining of tension headache for the last 5 days  Seems to be located base of the neck radiating to the school and between shoulder blades  She has had this pain for the past generally treated well with Flexeril  However she only had 1 dose of Flexeril which she took 3 days ago which seemed to relieve it briefly  Denies any nausea, vomiting or light sensitivity  Denies numbness or weakness of the face, arms or legs  She does describe a lot of neck stiffness  She believes tension headache are secondary to stress  Review of Systems   Review of Systems   Constitutional: Negative  HENT: Negative  Neurological: Positive for headaches           Current Medications       Current Outpatient Medications:     Ciclopirox 1 % shampoo, APPLY TO SCALP AS DIRECTED 2-3 TIMES A WEEK AS NEEDED, Disp: , Rfl: 1    multivitamin (THERAGRAN) TABS, Take 1 tablet by mouth daily, Disp: , Rfl:     Probiotic Product (SOLUBLE FIBER/PROBIOTICS) CHEW, Chew, Disp: , Rfl:     tretinoin (RETIN-A) 0 025 % cream, APPLY A PEA SIZE AMOUNT AS DIRECTED TO ACNE AREAS AT BEDTIME, Disp: , Rfl: 2    cyclobenzaprine (FLEXERIL) 5 mg tablet, Take 2 tablets (10 mg total) by mouth daily at bedtime, Disp: 10 tablet, Rfl: 1    ipratropium (ATROVENT) 0 03 % nasal spray, 2 sprays into each nostril every 12 (twelve) hours (Patient not taking: Reported on 2/25/2019), Disp: 30 mL, Rfl: 0    Current Facility-Administered Medications:     ketorolac (TORADOL) injection 60 mg, 60 mg, Intramuscular, Once, Nazanin Camacho MD    Current Allergies     Allergies as of 02/25/2019 - Reviewed 02/25/2019   Allergen Reaction Noted    Other      Pollen extract  02/18/2015            The following portions of the patient's history were reviewed and updated as appropriate: allergies, current medications, past family history, past medical history, past social history, past surgical history and problem list      Past Medical History:   Diagnosis Date    Kidney stone        Past Surgical History:   Procedure Laterality Date    CO CYSTO/URETERO W/LITHOTRIPSY &INDWELL STENT INSRT Right 7/11/2018    Procedure: CYSTOSCOPY URETEROSCOPY; RETROGRADE PYELOGRAM AND INSERTION STENT URETERAL;  Surgeon: Bryson Bolton MD;  Location: [facility] OR;  Service: Urology       Family History   Problem Relation Age of Onset    Nephrolithiasis Father     No Known Problems Mother          Medications have been verified  Objective   /77   Pulse (!) 115   Temp 98 2 °F (36 8 °C) (Tympanic)   Resp 16   Ht 5' 9" (1 753 m)   Wt 86 3 kg (190 lb 3 2 oz)   LMP 02/25/2019   SpO2 98%   BMI 28 09 kg/m²        Physical Exam     Physical Exam   Constitutional: She appears well-developed and well-nourished  Eyes: Pupils are equal, round, and reactive to light  EOM are normal    Musculoskeletal: Normal range of motion  She exhibits tenderness  C-spine:  Full range of motion  There is some point tenderness of the right and left trapezius muscle and also interscapular muscle  Extremities:  Upper-good strength and tone, 5/5

## 2019-02-25 NOTE — PATIENT INSTRUCTIONS
Patient given Toradol 60 mg IM in the office  I prescribed Flexeril 5 mg q h s  As needed  She may take ibuprofen or naproxen as needed  Advised the patient to apply heating pad to affected area when needed  Follow up with primary care provider symptoms persist or worsen  Tension Headache   WHAT YOU NEED TO KNOW:   Tension headaches are most often caused by stress, eye strain, or muscle tightness  The pain of a tension headache may start in the forehead or the back of the head  The pain often spreads over the whole head and down into the neck and shoulders  Over-the-counter pain medicine is the most useful and common treatment for a tension headache  Exercise, biofeedback, meditation, or relaxation techniques may also decrease your headache pain  DISCHARGE INSTRUCTIONS:   Return to the emergency department if:   · You have a sudden headache that seems different or much worse than your usual headaches  · You have difficulty seeing, speaking, or moving  · You pass out, become confused, or have a seizure  · You have a headache, fever, and a stiff neck  Contact your healthcare provider if:   · Your headaches continue to get worse  · Your headaches happen so often that they affect your ability to do your work or normal activities  · You need to take medicine to help your headaches more often than your healthcare provider says you should  · Your headaches get so bad that they cause you to vomit  · You have questions or concerns about your condition or care  Medicines:   · NSAIDs , such as ibuprofen, help decrease swelling, pain, and fever  This medicine is available with or without a doctor's order  NSAIDs can cause stomach bleeding or kidney problems in certain people  If you take blood thinner medicine, always ask your healthcare provider if NSAIDs are safe for you  Always read the medicine label and follow directions  · Acetaminophen  decreases pain and fever   It is available without a doctor's order  Ask how much to take and how often to take it  Follow directions  Read the labels of all other medicines you are using to see if they also contain acetaminophen, or ask your doctor or pharmacist  Acetaminophen can cause liver damage if not taken correctly  Do not use more than 4 grams (4,000 milligrams) total of acetaminophen in one day  · Take your medicine as directed  Contact your healthcare provider if you think your medicine is not helping or if you have side effects  Tell him of her if you are allergic to any medicine  Keep a list of the medicines, vitamins, and herbs you take  Include the amounts, and when and why you take them  Bring the list or the pill bottles to follow-up visits  Carry your medicine list with you in case of an emergency  Manage your symptoms:   · Keep a headache record  Include when the headaches start and stop and what made them better  Describe your symptoms, such as how the pain feels, where it is, and how bad it is  Record anything you ate or drank for the past 24 hours before your headache  Bring this to follow-up visits  · Apply heat as directed  Heat may help decrease headache pain and muscle spasms  Apply heat on the area for 20 to 30 minutes every 2 hours for as many days as directed  A warm bath may also help relieve muscle tension and spasms  · Apply ice as directed  Ice may help decrease headache pain  Use an ice pack or put crushed ice in a plastic bag  Cover it with a towel and place it on the area for 15 to 20 minutes every hour as directed  Prevent tension headaches:   · Avoid muscle tension  Do not stay in one position for long periods of time  Use a different pillow if you wake up with sore neck and shoulder muscles  Find ways to relax your muscles, such as massage or resting in a quiet, dark room  · Avoid eye strain  Make sure you have good lighting when you read, sew, or do similar activities   Get yearly eye exams and wear glasses as directed  · Get enough sleep  Get 8 to 10 hours of sleep each night  Create a sleep schedule  Go to bed and wake up at the same times each day  It may be helpful to do something relaxing before bed  Do not watch television right before bed  · Eat a variety of healthy foods  Healthy foods include fruits, vegetables, whole-grain breads, low-fat dairy products, beans, lean meats, and fish  Do not eat foods that trigger your headaches  · Exercise regularly  Exercise helps decrease stress and headaches  Ask about the best exercise plan for you  · Drink liquids as directed  You may need to drink more liquid to prevent dehydration  Dehydration can make a tension headache worse  Ask your healthcare provider how much liquid to drink and which liquids are best for you  Limit caffeine as directed  Caffeine may make a tension headache worse  · Do not drink alcohol  Alcohol can trigger a headache  It can also prevent medicines from stopping your headache  · Do not smoke  Nicotine and other chemicals in cigarettes and cigars can trigger a headache and also cause lung damage  Ask your healthcare provider for information if you currently smoke and need help to quit  E-cigarettes or smokeless tobacco still contain nicotine  Talk to your healthcare provider before you use these products  Follow up with your healthcare provider as directed:  Bring the headache record with you  Write down your questions so you remember to ask them during your visits  © 2017 [address] Information is for End User's use only and may not be sold, redistributed or otherwise used for commercial purposes  All illustrations and images included in CareNotes® are the copyrighted property of [employer]  or Karan [employer]  The above information is an  only  It is not intended as medical advice for individual conditions or treatments   Talk to your doctor, nurse or pharmacist before following any medical regimen to see if it is safe and effective for you

## 2019-10-08 ENCOUNTER — CLINICAL SUPPORT (OUTPATIENT)
Dept: FAMILY MEDICINE CLINIC | Facility: CLINIC | Age: 29
End: 2019-10-08
Payer: COMMERCIAL

## 2019-10-08 DIAGNOSIS — Z23 NEED FOR INFLUENZA VACCINATION: Primary | ICD-10-CM

## 2019-10-08 PROCEDURE — 90686 IIV4 VACC NO PRSV 0.5 ML IM: CPT

## 2019-10-08 PROCEDURE — 90471 IMMUNIZATION ADMIN: CPT

## 2019-11-14 ENCOUNTER — OFFICE VISIT (OUTPATIENT)
Dept: FAMILY MEDICINE CLINIC | Facility: CLINIC | Age: 29
End: 2019-11-14
Payer: COMMERCIAL

## 2019-11-14 VITALS
BODY MASS INDEX: 28.14 KG/M2 | TEMPERATURE: 98.1 F | DIASTOLIC BLOOD PRESSURE: 82 MMHG | HEART RATE: 98 BPM | HEIGHT: 69 IN | SYSTOLIC BLOOD PRESSURE: 116 MMHG | WEIGHT: 190 LBS | OXYGEN SATURATION: 98 %

## 2019-11-14 DIAGNOSIS — J02.9 PHARYNGITIS, UNSPECIFIED ETIOLOGY: Primary | ICD-10-CM

## 2019-11-14 DIAGNOSIS — G44.209 TENSION HEADACHE: ICD-10-CM

## 2019-11-14 PROCEDURE — 99214 OFFICE O/P EST MOD 30 MIN: CPT | Performed by: NURSE PRACTITIONER

## 2019-11-14 PROCEDURE — 1036F TOBACCO NON-USER: CPT | Performed by: NURSE PRACTITIONER

## 2019-11-14 RX ORDER — KETOROLAC TROMETHAMINE 30 MG/ML
60 INJECTION, SOLUTION INTRAMUSCULAR; INTRAVENOUS ONCE
Status: COMPLETED | OUTPATIENT
Start: 2019-11-14 | End: 2019-11-14

## 2019-11-14 RX ORDER — MULTIVIT-MIN/IRON/FOLIC ACID/K 18-600-40
CAPSULE ORAL
COMMUNITY

## 2019-11-14 RX ORDER — RIBOFLAVIN (VITAMIN B2) 100 MG
TABLET ORAL
COMMUNITY

## 2019-11-14 RX ORDER — AZITHROMYCIN 250 MG/1
TABLET, FILM COATED ORAL
Qty: 6 TABLET | Refills: 0 | Status: SHIPPED | OUTPATIENT
Start: 2019-11-14 | End: 2019-11-19

## 2019-11-14 RX ADMIN — KETOROLAC TROMETHAMINE 60 MG: 30 INJECTION, SOLUTION INTRAMUSCULAR; INTRAVENOUS at 10:19

## 2019-11-14 NOTE — PROGRESS NOTES
Assessment/Plan:    No problem-specific Assessment & Plan notes found for this encounter  Diagnoses and all orders for this visit:    Pharyngitis, unspecified etiology  -     azithromycin (ZITHROMAX) 250 mg tablet; Take 2 tablets (500 mg total) by mouth daily for 1 day, THEN 1 tablet (250 mg total) daily for 4 days  Fluids, rest  If not better in 2-3 days or worsening, follow up  Tension headache  -     ketorolac (TORADOL) 60 mg/2 mL IM injection 60 mg        Subjective:      Patient ID: Evgeny Washington is a 30 y o  female  Pt reports having a sore throat, headache, and ear ache x 1 wk  Has tried nyquil and dayquil without relief  Usually gets toradol injection when she gets the tension headache she has now; that med has been very effective in the past  Is worsening  The following portions of the patient's history were reviewed and updated as appropriate: allergies, current medications, past family history, past medical history, past social history, past surgical history and problem list     Review of Systems   Constitutional: Positive for fatigue  Negative for activity change, appetite change, chills and fever  HENT: Positive for congestion, ear pain and postnasal drip  Negative for rhinorrhea, sinus pressure, sinus pain, sore throat and voice change  Eyes: Negative for discharge and visual disturbance  Respiratory: Positive for cough (occ)  Gastrointestinal: Positive for nausea and vomiting  Neurological: Positive for headaches  Negative for dizziness and weakness  Hematological: Negative for adenopathy  Does not bruise/bleed easily  Objective:      /82 (BP Location: Left arm, Patient Position: Sitting, Cuff Size: Adult)   Pulse 98   Temp 98 1 °F (36 7 °C) (Oral)   Ht 5' 9 09" (1 755 m)   Wt 86 2 kg (190 lb)   SpO2 98%   BMI 27 98 kg/m²          Physical Exam   Constitutional: She is oriented to person, place, and time   She appears well-developed and well-nourished  HENT:   Head: Normocephalic  Right Ear: Hearing, tympanic membrane and ear canal normal  No middle ear effusion  Left Ear: Hearing, tympanic membrane and ear canal normal   No middle ear effusion  Mouth/Throat: Mucous membranes are normal  No oral lesions  No uvula swelling  Posterior oropharyngeal edema and posterior oropharyngeal erythema present  No oropharyngeal exudate or tonsillar abscesses  Tonsils are 1+ on the right  Tonsils are 1+ on the left  Nasophonic   Eyes: EOM are normal    Neck: Normal range of motion  Neck supple  Cardiovascular: Normal rate and normal heart sounds  No murmur heard  Pulmonary/Chest: Effort normal and breath sounds normal  She has no wheezes  She has no rhonchi  She has no rales  Lymphadenopathy:     She has no cervical adenopathy  Neurological: She is alert and oriented to person, place, and time  Skin: Skin is warm and dry  Psychiatric: She has a normal mood and affect  Her behavior is normal        BMI Counseling: Body mass index is 27 98 kg/m²  The BMI is above normal  Nutrition recommendations include reducing portion sizes, decreasing overall calorie intake, 3-5 servings of fruits/vegetables daily, reducing fast food intake, consuming healthier snacks, decreasing soda and/or juice intake and moderation in carbohydrate intake  Exercise recommendations include moderate aerobic physical activity for 150 minutes/week

## 2019-12-09 ENCOUNTER — APPOINTMENT (OUTPATIENT)
Dept: RADIOLOGY | Facility: MEDICAL CENTER | Age: 29
End: 2019-12-09
Payer: COMMERCIAL

## 2019-12-09 ENCOUNTER — HOSPITAL ENCOUNTER (OUTPATIENT)
Dept: ULTRASOUND IMAGING | Facility: MEDICAL CENTER | Age: 29
Discharge: HOME | End: 2019-12-09
Payer: COMMERCIAL

## 2019-12-09 ENCOUNTER — TRANSCRIBE ORDERS (OUTPATIENT)
Dept: ADMINISTRATIVE | Facility: HOSPITAL | Age: 29
End: 2019-12-09

## 2019-12-09 DIAGNOSIS — N20.0 KIDNEY CALCULI: ICD-10-CM

## 2019-12-09 PROCEDURE — 74018 RADEX ABDOMEN 1 VIEW: CPT

## 2019-12-09 PROCEDURE — 76770 US EXAM ABDO BACK WALL COMP: CPT

## 2019-12-11 NOTE — PROGRESS NOTES
12/13/2019      Chief Complaint   Patient presents with    Nephrolithiasis       Assessment and Plan    30 y o  female managed by Dr Casas Arm    1  4mm left UVJ calculi   - KUB negative   - U/S with bilateral ureteral jets and no overt hydronephrosis but RIGHT mild pelviectasis and central caliectasis  - stressed proper hydration was 60 oz water daily and a low oxalate diet    Follow up 1 year with KUB and ultrasound prior      History of Present Illness  Baldomero Concepcion is a 30 y o  female here for follow up evaluation of nephrolithiasis  The patient was very recently diagnosed with a 4 mm left UVJ calculi on a CT scan obtained 12/4/2019  The CT scan also revealed bilateral punctate stone burden measuring 3 mm and under  Per my review it appears as though she has a couple kidney stones on each side  The patient presents today with a KUB and ultrasound were negative for any stone burden  Her ultrasound also reveals no signs or symptoms of obstruction  The patient's last stone episode was in July of 2018  She was diagnosed with a 2 mm distal right ureteral calculi  She was unable to pass this and underwent ureteroscopy  Stone analysis of the time for surgery revealed a predominantly calcium oxalate nephrolithiasis  The patient has since been to Nephrology in the workup has been benign  She states she does have a strong family history of kidney stones  Her father had thyroid issues which cause this  Review of Systems   Constitutional: Negative for activity change, chills and fever  Gastrointestinal: Negative for abdominal distention and abdominal pain  Musculoskeletal: Negative for back pain and gait problem  Psychiatric/Behavioral: Negative for behavioral problems and confusion         Past Medical History  Past Medical History:   Diagnosis Date    Kidney stone        Past Social History  Past Surgical History:   Procedure Laterality Date    SC CYSTO/URETERO W/LITHOTRIPSY &INDWELL STENT INSRT Right 7/11/2018    Procedure: CYSTOSCOPY URETEROSCOPY; RETROGRADE PYELOGRAM AND INSERTION STENT URETERAL;  Surgeon: Donna eRad MD;  Location: [facility] OR;  Service: Urology     Social History     Tobacco Use   Smoking Status Never Smoker   Smokeless Tobacco Never Used       Past Family History  Family History   Problem Relation Age of Onset    Nephrolithiasis Father     No Known Problems Mother        Past Social history  Social History     Socioeconomic History    Marital status: /Civil Union     Spouse name: Not on file    Number of children: Not on file    Years of education: Not on file    Highest education level: Not on file   Occupational History    Not on file   Social Needs    Financial resource strain: Not on file    Food insecurity:     Worry: Not on file     Inability: Not on file    Transportation needs:     Medical: Not on file     Non-medical: Not on file   Tobacco Use    Smoking status: Never Smoker    Smokeless tobacco: Never Used   Substance and Sexual Activity    Alcohol use: No    Drug use: No    Sexual activity: Not on file   Lifestyle    Physical activity:     Days per week: Not on file     Minutes per session: Not on file    Stress: Not on file   Relationships    Social connections:     Talks on phone: Not on file     Gets together: Not on file     Attends [employer] service: Not on file     Active member of club or organization: Not on file     Attends meetings of clubs or organizations: Not on file     Relationship status: Not on file    Intimate partner violence:     Fear of current or ex partner: Not on file     Emotionally abused: Not on file     Physically abused: Not on file     Forced sexual activity: Not on file   Other Topics Concern    Not on file   Social History Narrative    Not on file       Current Medications  Current Outpatient Medications   Medication Sig Dispense Refill    Ascorbic Acid (VITAMIN C) 100 MG tablet Vitamin C      Cholecalciferol (VITAMIN D) 50 MCG (2000 UT) CAPS Vitamin D      Ciclopirox 1 % shampoo APPLY TO SCALP AS DIRECTED 2-3 TIMES A WEEK AS NEEDED  1    cyclobenzaprine (FLEXERIL) 5 mg tablet Take 2 tablets (10 mg total) by mouth daily at bedtime 10 tablet 1    ipratropium (ATROVENT) 0 03 % nasal spray 2 sprays into each nostril every 12 (twelve) hours 30 mL 0    multivitamin (THERAGRAN) TABS Take 1 tablet by mouth daily      Probiotic Product (PROBIOTIC-10 PO) Probiotic      Probiotic Product (SOLUBLE FIBER/PROBIOTICS) CHEW Chew      tretinoin (RETIN-A) 0 025 % cream APPLY A PEA SIZE AMOUNT AS DIRECTED TO ACNE AREAS AT BEDTIME  2     No current facility-administered medications for this visit  Allergies  Allergies   Allergen Reactions    Other     Pollen Extract          The following portions of the patient's history were reviewed and updated as appropriate: allergies, current medications, past medical history, past social history, past surgical history and problem list       Vitals  Vitals:    12/13/19 1244   BP: 118/70   Pulse: 83   Weight: 86 6 kg (191 lb)   Height: 5' 9" (1 753 m)           Physical Exam  Constitutional   General appearance: Patient is seated and in no acute distress, well appearing and well nourished  Head and Face   Head and face: Normal     Eyes   Conjunctiva and lids: No erythema, swelling or discharge  Ears, Nose, Mouth, and Throat   Hearing: Normal     Pulmonary   Respiratory effort: No increased work of breathing or signs of respiratory distress  Cardiovascular   Examination of extremities for edema and/or varicosities: Normal     Abdomen   Abdomen: Non-tender, no masses  Musculoskeletal   Gait and station: Normal     Skin   Skin and subcutaneous tissue: Warm, dry, and intact  No visible lesions or rashes    Psychiatric   Judgment and insight: Normal  Recent and remote memory:  Normal  Mood and affect: Normal      Results  No results found for this or any previous visit (from the past 1 hour(s))  ]  No results found for: PSA  Lab Results   Component Value Date    CALCIUM 8 6 07/11/2018    K 3 6 07/11/2018    CO2 26 07/11/2018     07/11/2018    BUN 9 07/11/2018    CREATININE 0 78 07/11/2018     Lab Results   Component Value Date    WBC 8 33 07/11/2018    HGB 11 9 07/11/2018    HCT 37 8 07/11/2018    MCV 92 07/11/2018     07/11/2018       Orders  Orders Placed This Encounter   Procedures    XR abdomen 1 view kub     Standing Status:   Future     Standing Expiration Date:   12/13/2023     Scheduling Instructions:      Bring along any outside films relating to this procedure  Order Specific Question:   Reason for Exam:     Answer:   nephrolithiasis     Order Specific Question:   Is the patient pregnant? Answer:   Unknown    US kidney and bladder     Standing Status:   Future     Standing Expiration Date:   12/13/2023     Scheduling Instructions:      "Prep required if being scheduled in conjunction with other studies, refer to those examination's Preps first before scheduling  All patients for US Kidney and Bladder they must drink 24 oz of water 60 minutes before your scheduled appointment time  This test requires you to have a FULL bladder  Please do not urinate before your test             Please bring your physician order, insurance cards, a form of photo ID and a list of your medications with you  Arrive 15 minutes prior to your appointment time in order to register  If you need to have lab work or a urinalysis, please do this AFTER your ultrasound  "            To schedule this appointment, please contact Central Scheduling at [phone]  Order Specific Question:   Reason for Exam:     Answer:   Calculi     Order Specific Question:   Is the patient pregnant?      Answer:   Unknown

## 2019-12-13 ENCOUNTER — OFFICE VISIT (OUTPATIENT)
Dept: UROLOGY | Facility: CLINIC | Age: 29
End: 2019-12-13
Payer: COMMERCIAL

## 2019-12-13 VITALS
WEIGHT: 191 LBS | BODY MASS INDEX: 28.29 KG/M2 | DIASTOLIC BLOOD PRESSURE: 70 MMHG | HEIGHT: 69 IN | SYSTOLIC BLOOD PRESSURE: 118 MMHG | HEART RATE: 83 BPM

## 2019-12-13 DIAGNOSIS — N20.0 NEPHROLITHIASIS: Primary | ICD-10-CM

## 2019-12-13 PROCEDURE — 99213 OFFICE O/P EST LOW 20 MIN: CPT | Performed by: PHYSICIAN ASSISTANT

## 2019-12-19 ENCOUNTER — OFFICE VISIT (OUTPATIENT)
Dept: FAMILY MEDICINE CLINIC | Facility: CLINIC | Age: 29
End: 2019-12-19
Payer: COMMERCIAL

## 2019-12-19 VITALS
DIASTOLIC BLOOD PRESSURE: 80 MMHG | OXYGEN SATURATION: 99 % | HEIGHT: 69 IN | WEIGHT: 191 LBS | HEART RATE: 80 BPM | TEMPERATURE: 98.7 F | BODY MASS INDEX: 28.29 KG/M2 | SYSTOLIC BLOOD PRESSURE: 108 MMHG

## 2019-12-19 DIAGNOSIS — J32.9 SINUSITIS, UNSPECIFIED CHRONICITY, UNSPECIFIED LOCATION: ICD-10-CM

## 2019-12-19 DIAGNOSIS — K59.00 CONSTIPATION, UNSPECIFIED CONSTIPATION TYPE: Primary | ICD-10-CM

## 2019-12-19 PROCEDURE — 99213 OFFICE O/P EST LOW 20 MIN: CPT | Performed by: FAMILY MEDICINE

## 2019-12-19 RX ORDER — AZITHROMYCIN 250 MG/1
TABLET, FILM COATED ORAL
Qty: 6 TABLET | Refills: 0 | Status: SHIPPED | OUTPATIENT
Start: 2019-12-19 | End: 2019-12-26

## 2019-12-19 NOTE — PROGRESS NOTES
Assessment/Plan:  Recommend daily fiber supplementation as noted  Return to office if no improvement  Follow-up with colorectal specialist considering blood in the stool  Card given for Dr Nino Eaton  No problem-specific Assessment & Plan notes found for this encounter  Diagnoses and all orders for this visit:    Sinusitis, unspecified chronicity, unspecified location  -     azithromycin (ZITHROMAX) 250 mg tablet; Take 2 tablets today then 1 tablet daily x 4 days    Constipation, unspecified constipation type  -     psyllium (METAMUCIL) 0 52 g capsule; Take 1 capsule (0 52 g total) by mouth daily          Subjective:      Patient ID: Cooper Chatman is a 30 y o  female  Patient was constipated for a few days then had loose stools this morning  She felt uncomfortable afterward and had some blood in the stool after pressing to have the bowel movement  She believe she was constipated for several days prior to the episode  The following portions of the patient's history were reviewed and updated as appropriate: allergies, current medications, past family history, past medical history, past social history, past surgical history and problem list     Review of Systems   Constitutional: Negative  HENT: Negative  Eyes: Negative  Respiratory: Negative  Cardiovascular: Negative  Gastrointestinal: Negative  As noted in HPI   Endocrine: Negative  Genitourinary: Negative  Musculoskeletal: Negative  Skin: Negative  Allergic/Immunologic: Negative  Neurological: Negative  Hematological: Negative  Psychiatric/Behavioral: Negative  Objective:      /80 (BP Location: Left arm, Patient Position: Sitting, Cuff Size: Standard)   Pulse 80   Temp 98 7 °F (37 1 °C) (Tympanic)   Ht 5' 8 9" (1 75 m)   Wt 86 6 kg (191 lb)   LMP 12/02/2019 (Approximate)   SpO2 99%   BMI 28 29 kg/m²          Physical Exam   Constitutional: She is oriented to person, place, and time  She appears well-developed and well-nourished  HENT:   Head: Normocephalic and atraumatic  Right Ear: External ear normal  Tympanic membrane is not erythematous and not bulging  Left Ear: External ear normal  Tympanic membrane is not erythematous and not bulging  Nose: Nose normal    Mouth/Throat: Oropharynx is clear and moist and mucous membranes are normal  No oral lesions  No oropharyngeal exudate  Eyes: Conjunctivae and EOM are normal  Right eye exhibits no discharge  Left eye exhibits no discharge  No scleral icterus  Neck: Normal range of motion  Neck supple  No thyromegaly present  Cardiovascular: Normal rate, regular rhythm and normal heart sounds  Exam reveals no gallop and no friction rub  No murmur heard  Pulmonary/Chest: Effort normal  No respiratory distress  She has no wheezes  She has no rales  She exhibits no tenderness  Abdominal: Soft  Bowel sounds are normal  She exhibits no distension and no mass  There is no tenderness  There is no rebound and no guarding  Musculoskeletal: Normal range of motion  She exhibits no edema, tenderness or deformity  Lymphadenopathy:     She has no cervical adenopathy  Neurological: She is alert and oriented to person, place, and time  She has normal reflexes  No cranial nerve deficit  She exhibits normal muscle tone  Coordination normal    Skin: Skin is warm and dry  No rash noted  No erythema  No pallor  Psychiatric: She has a normal mood and affect  Her behavior is normal    Vitals reviewed

## 2019-12-24 ENCOUNTER — TELEPHONE (OUTPATIENT)
Dept: FAMILY MEDICINE CLINIC | Facility: CLINIC | Age: 29
End: 2019-12-24

## 2019-12-24 DIAGNOSIS — J40 BRONCHITIS: Primary | ICD-10-CM

## 2019-12-24 RX ORDER — SULFAMETHOXAZOLE AND TRIMETHOPRIM 800; 160 MG/1; MG/1
1 TABLET ORAL 2 TIMES DAILY
Qty: 14 TABLET | Refills: 0 | Status: SHIPPED | OUTPATIENT
Start: 2019-12-24 | End: 2019-12-31

## 2019-12-24 NOTE — TELEPHONE ENCOUNTER
Took call from patient stating she finished Hui Pierce today and feels worse then when she started Hui Pierce  No available appts left today, would like for you to send something to the pharmacy  Please advise

## 2020-06-09 ENCOUNTER — OFFICE VISIT (OUTPATIENT)
Dept: FAMILY MEDICINE CLINIC | Facility: CLINIC | Age: 30
End: 2020-06-09
Payer: COMMERCIAL

## 2020-06-09 VITALS
BODY MASS INDEX: 28.79 KG/M2 | HEIGHT: 68 IN | DIASTOLIC BLOOD PRESSURE: 70 MMHG | WEIGHT: 190 LBS | SYSTOLIC BLOOD PRESSURE: 100 MMHG | TEMPERATURE: 99 F

## 2020-06-09 DIAGNOSIS — H60.91 OTITIS EXTERNA OF RIGHT EAR, UNSPECIFIED CHRONICITY, UNSPECIFIED TYPE: Primary | ICD-10-CM

## 2020-06-09 PROBLEM — N20.0 NEPHROLITHIASIS: Status: RESOLVED | Noted: 2018-07-10 | Resolved: 2020-06-09

## 2020-06-09 PROCEDURE — 1036F TOBACCO NON-USER: CPT | Performed by: NURSE PRACTITIONER

## 2020-06-09 PROCEDURE — 99213 OFFICE O/P EST LOW 20 MIN: CPT | Performed by: NURSE PRACTITIONER

## 2020-06-09 PROCEDURE — 3008F BODY MASS INDEX DOCD: CPT | Performed by: NURSE PRACTITIONER

## 2020-06-09 RX ORDER — FLUTICASONE PROPIONATE 50 MCG
1 SPRAY, SUSPENSION (ML) NASAL DAILY
COMMUNITY

## 2021-03-17 ENCOUNTER — OFFICE VISIT (OUTPATIENT)
Dept: FAMILY MEDICINE CLINIC | Facility: CLINIC | Age: 31
End: 2021-03-17
Payer: COMMERCIAL

## 2021-03-17 VITALS
WEIGHT: 188.8 LBS | SYSTOLIC BLOOD PRESSURE: 120 MMHG | HEART RATE: 89 BPM | TEMPERATURE: 97.2 F | DIASTOLIC BLOOD PRESSURE: 80 MMHG | BODY MASS INDEX: 27.96 KG/M2 | OXYGEN SATURATION: 98 % | HEIGHT: 69 IN

## 2021-03-17 DIAGNOSIS — D36.7 BENIGN NEOPLASM OF NECK: Primary | ICD-10-CM

## 2021-03-17 PROCEDURE — 99213 OFFICE O/P EST LOW 20 MIN: CPT | Performed by: FAMILY MEDICINE

## 2021-03-17 PROCEDURE — 3725F SCREEN DEPRESSION PERFORMED: CPT | Performed by: FAMILY MEDICINE

## 2021-03-17 NOTE — PROGRESS NOTES
Assessment/Plan:     1  Benign neoplasm of neck  -     US thyroid; Future; Expected date: 03/17/2021          Subjective:      Patient ID: Loy Brown is a 26 y o  female  Patient with a lump in the anterior right neck  Nonpainful  Onset 1-2 weeks ago  BMI Counseling: Body mass index is 28 29 kg/m²  The BMI is above normal  Nutrition recommendations include decreasing portion sizes  Exercise recommendations include moderate physical activity 150 minutes/week  Depression Screening and Follow-up Plan: Patient's depression screening was positive with a PHQ-2 score of 0  Clincally patient does not have depression  No treatment is required  The following portions of the patient's history were reviewed and updated as appropriate: allergies, current medications, past family history, past medical history, past social history, past surgical history, and problem list     Review of Systems   Constitutional: Negative  HENT:        As noted in HPI   Eyes: Negative  Respiratory: Negative  Cardiovascular: Negative  Gastrointestinal: Negative  Endocrine: Negative  Genitourinary: Negative  Musculoskeletal: Negative  Skin: Negative  Allergic/Immunologic: Negative  Neurological: Negative  Hematological: Negative  Psychiatric/Behavioral: Negative  Objective:      /80 (BP Location: Left arm, Patient Position: Sitting, Cuff Size: Adult)   Pulse 89   Temp (!) 97 2 °F (36 2 °C)   Ht 5' 8 5" (1 74 m)   Wt 85 6 kg (188 lb 12 8 oz)   SpO2 98%   BMI 28 29 kg/m²          Physical Exam  Vitals signs reviewed  Constitutional:       Appearance: She is well-developed  HENT:      Head: Normocephalic and atraumatic  Comments: Likely thyroid cyst   Nontender  Soft  Present to the right side of the thyroid area  Right Ear: External ear normal  Tympanic membrane is not erythematous or bulging        Left Ear: External ear normal  Tympanic membrane is not erythematous or bulging  Nose: Nose normal       Mouth/Throat:      Mouth: No oral lesions  Pharynx: No oropharyngeal exudate  Eyes:      General: No scleral icterus  Right eye: No discharge  Left eye: No discharge  Conjunctiva/sclera: Conjunctivae normal    Neck:      Musculoskeletal: Normal range of motion and neck supple  Thyroid: No thyromegaly  Cardiovascular:      Rate and Rhythm: Normal rate and regular rhythm  Heart sounds: Normal heart sounds  No murmur  No friction rub  No gallop  Pulmonary:      Effort: Pulmonary effort is normal  No respiratory distress  Breath sounds: No wheezing or rales  Chest:      Chest wall: No tenderness  Abdominal:      General: Bowel sounds are normal  There is no distension  Palpations: Abdomen is soft  There is no mass  Tenderness: There is no abdominal tenderness  There is no guarding or rebound  Musculoskeletal: Normal range of motion  General: No tenderness or deformity  Lymphadenopathy:      Cervical: No cervical adenopathy  Skin:     General: Skin is warm and dry  Coloration: Skin is not pale  Findings: No erythema or rash  Neurological:      Mental Status: She is alert and oriented to person, place, and time  Cranial Nerves: No cranial nerve deficit  Motor: No abnormal muscle tone  Coordination: Coordination normal       Deep Tendon Reflexes: Reflexes are normal and symmetric     Psychiatric:         Behavior: Behavior normal

## 2021-03-18 ENCOUNTER — HOSPITAL ENCOUNTER (OUTPATIENT)
Dept: ULTRASOUND IMAGING | Facility: HOSPITAL | Age: 31
Discharge: HOME | End: 2021-03-18
Payer: COMMERCIAL

## 2021-03-18 DIAGNOSIS — D36.7 BENIGN NEOPLASM OF NECK: ICD-10-CM

## 2021-03-18 PROCEDURE — 76536 US EXAM OF HEAD AND NECK: CPT

## 2021-03-19 ENCOUNTER — OFFICE VISIT (OUTPATIENT)
Dept: FAMILY MEDICINE CLINIC | Facility: CLINIC | Age: 31
End: 2021-03-19
Payer: COMMERCIAL

## 2021-03-19 ENCOUNTER — TELEPHONE (OUTPATIENT)
Dept: FAMILY MEDICINE CLINIC | Facility: CLINIC | Age: 31
End: 2021-03-19

## 2021-03-19 VITALS
OXYGEN SATURATION: 98 % | DIASTOLIC BLOOD PRESSURE: 70 MMHG | HEIGHT: 68 IN | HEART RATE: 119 BPM | WEIGHT: 187 LBS | TEMPERATURE: 96.7 F | BODY MASS INDEX: 28.34 KG/M2 | SYSTOLIC BLOOD PRESSURE: 120 MMHG

## 2021-03-19 DIAGNOSIS — E04.1 THYROID NODULE: Primary | ICD-10-CM

## 2021-03-19 PROCEDURE — 3008F BODY MASS INDEX DOCD: CPT | Performed by: FAMILY MEDICINE

## 2021-03-19 PROCEDURE — 1036F TOBACCO NON-USER: CPT | Performed by: FAMILY MEDICINE

## 2021-03-19 PROCEDURE — 99213 OFFICE O/P EST LOW 20 MIN: CPT | Performed by: FAMILY MEDICINE

## 2021-03-19 NOTE — TELEPHONE ENCOUNTER
Renita Malone called from [facility] ([facility]) radiology to state that there is a significant finding on pts thyroid ultrasound  Results are in the chart  Please advise  Thank you

## 2021-03-19 NOTE — PROGRESS NOTES
Assessment/Plan:  We discussed treatment options with possibly getting thyroid biopsy by needle biopsy and Interventional Radiology  Patient prefers to see ear nose and throat specialist   Referral provided  1  Thyroid nodule          Subjective:      Patient ID: Cooper Chatman is a 26 y o  female  Patient seen today for discussion of thyroid ultrasound  She has a 2 6 cm cyst to the ultrasound  She is generally otherwise feeling well  The following portions of the patient's history were reviewed and updated as appropriate: allergies, current medications, past family history, past medical history, past social history, past surgical history, and problem list     Review of Systems   Constitutional: Negative  HENT: Negative  Eyes: Negative  Respiratory: Negative  Cardiovascular: Negative  Gastrointestinal: Negative  Endocrine: Negative  Genitourinary: Negative  Musculoskeletal: Negative  Skin: Negative  Allergic/Immunologic: Negative  Neurological: Negative  Hematological: Negative  Psychiatric/Behavioral: Negative  Objective:      /70 (BP Location: Left arm, Patient Position: Sitting, Cuff Size: Adult)   Pulse (!) 119   Temp (!) 96 7 °F (35 9 °C)   Ht 5' 8" (1 727 m)   Wt 84 8 kg (187 lb)   SpO2 98%   BMI 28 43 kg/m²          Physical Exam  Constitutional:       General: She is not in acute distress  Appearance: She is well-developed  She is not diaphoretic  Neurological:      Mental Status: She is alert and oriented to person, place, and time  Psychiatric:         Behavior: Behavior normal          Thought Content:  Thought content normal          Judgment: Judgment normal

## 2021-03-19 NOTE — TELEPHONE ENCOUNTER
Pt called stating that she saw the results on MyChart and is asking for your recommendations  Please advise  Thank you

## 2021-03-19 NOTE — TELEPHONE ENCOUNTER
Patient called our office in [facility] wanting to schedule an appt due to the findings on her ultrasound  I advised the patient to wait to hear back from Dr Tristan Malhotra and to have Dr Tristan Malhotra  put a doctor to doctor referral in the system stating ASAP

## 2021-03-19 NOTE — TELEPHONE ENCOUNTER
Pt calling asking if  reviewed results  I advised her that he has been seeing pt's  Pt would like call before weekend  Please advise

## 2021-04-13 DIAGNOSIS — Z23 ENCOUNTER FOR IMMUNIZATION: ICD-10-CM

## 2021-05-27 ENCOUNTER — IMMUNIZATIONS (OUTPATIENT)
Dept: FAMILY MEDICINE CLINIC | Facility: HOSPITAL | Age: 31
End: 2021-05-27

## 2021-05-27 DIAGNOSIS — Z23 ENCOUNTER FOR IMMUNIZATION: Primary | ICD-10-CM

## 2021-05-27 PROCEDURE — 91301 SARS-COV-2 / COVID-19 MRNA VACCINE (MODERNA) 100 MCG: CPT

## 2021-05-27 PROCEDURE — 0011A SARS-COV-2 / COVID-19 MRNA VACCINE (MODERNA) 100 MCG: CPT

## 2021-06-02 ENCOUNTER — OFFICE VISIT (OUTPATIENT)
Dept: FAMILY MEDICINE CLINIC | Facility: CLINIC | Age: 31
End: 2021-06-02
Payer: COMMERCIAL

## 2021-06-02 VITALS
WEIGHT: 191 LBS | HEIGHT: 68 IN | OXYGEN SATURATION: 99 % | TEMPERATURE: 97.7 F | HEART RATE: 89 BPM | BODY MASS INDEX: 28.95 KG/M2 | DIASTOLIC BLOOD PRESSURE: 70 MMHG | SYSTOLIC BLOOD PRESSURE: 110 MMHG

## 2021-06-02 DIAGNOSIS — E04.1 THYROID NODULE: ICD-10-CM

## 2021-06-02 DIAGNOSIS — R35.0 URINE FREQUENCY: Primary | ICD-10-CM

## 2021-06-02 LAB
SL AMB  POCT GLUCOSE, UA: NORMAL
SL AMB LEUKOCYTE ESTERASE,UA: ABNORMAL
SL AMB POCT BILIRUBIN,UA: NEGATIVE
SL AMB POCT BLOOD,UA: ABNORMAL
SL AMB POCT CLARITY,UA: CLEAR
SL AMB POCT COLOR,UA: YELLOW
SL AMB POCT KETONES,UA: NEGATIVE
SL AMB POCT NITRITE,UA: NEGATIVE
SL AMB POCT PH,UA: 7
SL AMB POCT SPECIFIC GRAVITY,UA: 1
SL AMB POCT URINE PROTEIN: ABNORMAL
SL AMB POCT UROBILINOGEN: NORMAL

## 2021-06-02 PROCEDURE — 99214 OFFICE O/P EST MOD 30 MIN: CPT | Performed by: FAMILY MEDICINE

## 2021-06-02 PROCEDURE — 1036F TOBACCO NON-USER: CPT | Performed by: FAMILY MEDICINE

## 2021-06-02 PROCEDURE — 81002 URINALYSIS NONAUTO W/O SCOPE: CPT | Performed by: FAMILY MEDICINE

## 2021-06-02 PROCEDURE — 3008F BODY MASS INDEX DOCD: CPT | Performed by: FAMILY MEDICINE

## 2021-06-02 RX ORDER — CIPROFLOXACIN 500 MG/1
500 TABLET, FILM COATED ORAL EVERY 12 HOURS SCHEDULED
Qty: 10 TABLET | Refills: 0 | Status: SHIPPED | OUTPATIENT
Start: 2021-06-02 | End: 2021-06-07

## 2021-06-02 RX ORDER — FLUCONAZOLE 150 MG/1
150 TABLET ORAL ONCE
Qty: 1 TABLET | Refills: 0 | Status: SHIPPED | OUTPATIENT
Start: 2021-06-02 | End: 2021-06-02

## 2021-06-02 RX ORDER — PHENAZOPYRIDINE HYDROCHLORIDE 200 MG/1
200 TABLET, FILM COATED ORAL EVERY 8 HOURS PRN
Qty: 10 TABLET | Refills: 0 | Status: SHIPPED | OUTPATIENT
Start: 2021-06-02

## 2021-06-02 NOTE — PROGRESS NOTES
Assessment/Plan:  Await urine culture results  Treat with antibiotics if needed  She will call with any new persisting or worsening symptoms  1  Urine frequency  -     POCT urine dip  -     Urine culture; Future  -     phenazopyridine (PYRIDIUM) 200 mg tablet; Take 1 tablet (200 mg total) by mouth every 8 (eight) hours as needed for bladder spasms  -     fluconazole (DIFLUCAN) 150 mg tablet; Take 1 tablet (150 mg total) by mouth once for 1 dose  -     ciprofloxacin (CIPRO) 500 mg tablet; Take 1 tablet (500 mg total) by mouth every 12 (twelve) hours for 5 days    2  Thyroid nodule  Assessment & Plan:  Patient has follow-up scheduled with Surgical Oncology for 6 months  She is getting a follow-up ultrasound in 6 months  Biopsy results were reviewed and were benign  She will call with any changes and we can repeat thyroid ultrasound in the interim if needed  Subjective:      Patient ID: Seferino Cruz is a 26 y o  female  Patient here with primary concern of urinary frequency and urgency for the last several days  No fevers  No back pain  She does note that she had Covid vaccine 1  Done about 5 days ago  Her arm was sore for a few days  She developed dysuria symptoms and darker urine a few days afterward  Denies any back pain  she also recently had seen underlying allergy as well as Surgical Oncology for 2 6 cm thyroid nodule  Biopsy results were reviewed today as well as consultation reports  The following portions of the patient's history were reviewed and updated as appropriate: allergies, current medications, past family history, past medical history, past social history, past surgical history, and problem list     Review of Systems   Constitutional: Negative  HENT: Negative  Eyes: Negative  Respiratory: Negative  Cardiovascular: Negative  Gastrointestinal: Negative  Endocrine: Negative  Genitourinary: Positive for dysuria  Musculoskeletal: Negative  Skin: Negative  Allergic/Immunologic: Negative  Neurological: Negative  Hematological: Negative  Psychiatric/Behavioral: Negative  Objective:      /70 (BP Location: Left arm, Patient Position: Sitting, Cuff Size: Standard)   Pulse 89   Temp 97 7 °F (36 5 °C) (Temporal)   Ht 5' 8" (1 727 m)   Wt 86 6 kg (191 lb)   SpO2 99%   BMI 29 04 kg/m²          Physical Exam  Vitals signs reviewed  Constitutional:       Appearance: She is well-developed  HENT:      Head: Normocephalic and atraumatic  Right Ear: External ear normal  Tympanic membrane is not erythematous or bulging  Left Ear: External ear normal  Tympanic membrane is not erythematous or bulging  Nose: Nose normal       Mouth/Throat:      Mouth: No oral lesions  Pharynx: No oropharyngeal exudate  Eyes:      General: No scleral icterus  Right eye: No discharge  Left eye: No discharge  Conjunctiva/sclera: Conjunctivae normal    Neck:      Musculoskeletal: Normal range of motion and neck supple  Thyroid: No thyromegaly  Cardiovascular:      Rate and Rhythm: Normal rate and regular rhythm  Heart sounds: Normal heart sounds  No murmur  No friction rub  No gallop  Pulmonary:      Effort: Pulmonary effort is normal  No respiratory distress  Breath sounds: No wheezing or rales  Chest:      Chest wall: No tenderness  Abdominal:      General: Bowel sounds are normal  There is no distension  Palpations: Abdomen is soft  There is no mass  Tenderness: There is no abdominal tenderness  There is no guarding or rebound  Musculoskeletal: Normal range of motion  General: No tenderness or deformity  Lymphadenopathy:      Cervical: No cervical adenopathy  Skin:     General: Skin is warm and dry  Coloration: Skin is not pale  Findings: No erythema or rash  Neurological:      Mental Status: She is alert and oriented to person, place, and time  Cranial Nerves: No cranial nerve deficit  Motor: No abnormal muscle tone  Coordination: Coordination normal       Deep Tendon Reflexes: Reflexes are normal and symmetric     Psychiatric:         Behavior: Behavior normal

## 2021-06-02 NOTE — ASSESSMENT & PLAN NOTE
Patient has follow-up scheduled with Surgical Oncology for 6 months  She is getting a follow-up ultrasound in 6 months  Biopsy results were reviewed and were benign  She will call with any changes and we can repeat thyroid ultrasound in the interim if needed

## 2021-06-04 ENCOUNTER — TELEPHONE (OUTPATIENT)
Dept: FAMILY MEDICINE CLINIC | Facility: CLINIC | Age: 31
End: 2021-06-04

## 2021-06-26 ENCOUNTER — IMMUNIZATIONS (OUTPATIENT)
Dept: FAMILY MEDICINE CLINIC | Facility: HOSPITAL | Age: 31
End: 2021-06-26

## 2021-06-26 DIAGNOSIS — Z23 ENCOUNTER FOR IMMUNIZATION: Primary | ICD-10-CM

## 2021-06-26 PROCEDURE — 0012A SARS-COV-2 / COVID-19 MRNA VACCINE (MODERNA) 100 MCG: CPT

## 2021-06-26 PROCEDURE — 91301 SARS-COV-2 / COVID-19 MRNA VACCINE (MODERNA) 100 MCG: CPT

## 2022-01-11 ENCOUNTER — NURSE TRIAGE (OUTPATIENT)
Dept: OTHER | Facility: OTHER | Age: 32
End: 2022-01-11

## 2022-01-11 NOTE — TELEPHONE ENCOUNTER
Reason for Disposition   All other earaches (Exceptions: earache lasting < 1 hour, and earache from air travel)    Answer Assessment - Initial Assessment Questions  1  LOCATION: "Which ear is involved?"      Right ear    2  ONSET: "When did the ear start hurting"       This past Weekend    3  SEVERITY: "How bad is the pain?"  (Scale 1-10; mild, moderate or severe)    - MILD (1-3): doesn't interfere with normal activities     - MODERATE (4-7): interferes with normal activities or awakens from sleep     - SEVERE (8-10): excruciating pain, unable to do any normal activities      5/10    4  URI SYMPTOMS: "Do you have a runny nose or cough?"      Denies    5  FEVER: "Do you have a fever?" If Yes, ask: "What is your temperature, how was it measured, and when did it start?"      Denies    6  CAUSE: "Have you been swimming recently?", "How often do you use Q-TIPS?", "Have you had any recent air travel or scuba diving?"      Denies    7  OTHER SYMPTOMS: "Do you have any other symptoms?" (e g , headache, stiff neck, dizziness, vomiting, runny nose, decreased hearing)      Denies    8   PREGNANCY: "Is there any chance you are pregnant?" "When was your last menstrual period?"      Denies    Protocols used: EARACHE-ADULT-[place]

## 2022-01-11 NOTE — TELEPHONE ENCOUNTER
Regarding: Ear pain / [facility]  ----- Message from [place] sent at 1/11/2022  4:22 PM EST -----  "I am experiencing pain in my right ear   What should I do?"

## 2022-01-12 ENCOUNTER — OFFICE VISIT (OUTPATIENT)
Dept: FAMILY MEDICINE CLINIC | Facility: CLINIC | Age: 32
End: 2022-01-12
Payer: COMMERCIAL

## 2022-01-12 VITALS
OXYGEN SATURATION: 99 % | WEIGHT: 188 LBS | HEIGHT: 69 IN | HEART RATE: 89 BPM | DIASTOLIC BLOOD PRESSURE: 80 MMHG | TEMPERATURE: 97.7 F | SYSTOLIC BLOOD PRESSURE: 120 MMHG | BODY MASS INDEX: 27.85 KG/M2

## 2022-01-12 DIAGNOSIS — H60.501 ACUTE OTITIS EXTERNA OF RIGHT EAR, UNSPECIFIED TYPE: Primary | ICD-10-CM

## 2022-01-12 PROCEDURE — 99213 OFFICE O/P EST LOW 20 MIN: CPT | Performed by: FAMILY MEDICINE

## 2022-01-12 PROCEDURE — 3008F BODY MASS INDEX DOCD: CPT | Performed by: FAMILY MEDICINE

## 2022-01-12 PROCEDURE — 1036F TOBACCO NON-USER: CPT | Performed by: FAMILY MEDICINE

## 2022-01-12 RX ORDER — CIPROFLOXACIN AND DEXAMETHASONE 3; 1 MG/ML; MG/ML
4 SUSPENSION/ DROPS AURICULAR (OTIC) 2 TIMES DAILY
Qty: 7.5 ML | Refills: 0 | Status: SHIPPED | OUTPATIENT
Start: 2022-01-12 | End: 2022-01-19

## 2022-01-12 NOTE — PROGRESS NOTES
Assessment/Plan:    Acute otitis externa of right ear  - Start 7 day course of ciprodex; patient to call should symptoms fail to resolve or worsen        Diagnoses and all orders for this visit:    Acute otitis externa of right ear, unspecified type  -     ciprofloxacin-dexamethasone (CIPRODEX) otic suspension; Administer 4 drops to the right ear 2 (two) times a day for 7 days          Subjective:      Patient ID: Naida Patrick is a 33 y o  female  Earache   There is pain in the right ear  This is a new problem  The current episode started in the past 7 days  There has been no fever  The pain is moderate  Pertinent negatives include no abdominal pain, coughing, ear discharge, hearing loss, rhinorrhea or sore throat  She has tried NSAIDs for the symptoms  The treatment provided mild relief  There is no history of a chronic ear infection, hearing loss or a tympanostomy tube  The following portions of the patient's history were reviewed and updated as appropriate: allergies, current medications, past family history, past medical history, past social history, past surgical history and problem list     Review of Systems   Constitutional: Negative for chills  HENT: Positive for ear pain  Negative for ear discharge, hearing loss, rhinorrhea and sore throat  Eyes: Negative  Respiratory: Negative for cough and shortness of breath  Cardiovascular: Negative  Gastrointestinal: Negative for abdominal pain  Genitourinary: Negative  Musculoskeletal: Negative  Skin: Negative  Objective:      /80 (BP Location: Left arm, Patient Position: Sitting, Cuff Size: Standard)   Pulse 89   Temp 97 7 °F (36 5 °C) (Skin)   Ht 5' 9" (1 753 m)   Wt 85 3 kg (188 lb)   SpO2 99%   BMI 27 76 kg/m²          Physical Exam  Constitutional:       General: She is not in acute distress  Appearance: Normal appearance  She is not ill-appearing  HENT:      Head: Normocephalic and atraumatic        Left Ear: Tympanic membrane, ear canal and external ear normal       Ears:      Comments: Mildly erythematous right external ear canal  Eyes:      General:         Right eye: No discharge  Left eye: No discharge  Extraocular Movements: Extraocular movements intact  Pulmonary:      Effort: No respiratory distress  Neurological:      General: No focal deficit present  Mental Status: She is alert     Psychiatric:         Mood and Affect: Mood normal          Behavior: Behavior normal

## 2022-07-30 NOTE — PLAN OF CARE
DISCHARGE PLANNING     Discharge to home or other facility with appropriate resources Progressing        GASTROINTESTINAL - ADULT     Minimal or absence of nausea and/or vomiting Progressing     Maintains adequate nutritional intake Progressing        GENITOURINARY - ADULT     Maintains or returns to baseline urinary function Progressing     Absence of urinary retention Progressing     Urinary catheter remains patent Progressing        INFECTION - ADULT     Absence or prevention of progression during hospitalization Progressing        Knowledge Deficit     Patient/family/caregiver demonstrates understanding of disease process, treatment plan, medications, and discharge instructions Progressing        METABOLIC, FLUID AND ELECTROLYTES - ADULT     Fluid balance maintained Progressing        MUSCULOSKELETAL - ADULT     Maintain or return mobility to safest level of function Progressing     Maintain proper alignment of affected body part Progressing        PAIN - ADULT     Verbalizes/displays adequate comfort level or baseline comfort level Progressing        SAFETY ADULT     Patient will remain free of falls Progressing     Maintain or return to baseline ADL function Progressing     Maintain or return mobility status to optimal level Progressing · Continue PTA lisinopril, bisoprolol  · HCTZ on hold  · Trend blood pressures

## 2022-09-12 ENCOUNTER — OFFICE VISIT (OUTPATIENT)
Dept: URGENT CARE | Facility: MEDICAL CENTER | Age: 32
End: 2022-09-12
Payer: COMMERCIAL

## 2022-09-12 VITALS
OXYGEN SATURATION: 98 % | RESPIRATION RATE: 18 BRPM | HEART RATE: 92 BPM | TEMPERATURE: 98.5 F | SYSTOLIC BLOOD PRESSURE: 123 MMHG | DIASTOLIC BLOOD PRESSURE: 86 MMHG

## 2022-09-12 DIAGNOSIS — M62.838 TRAPEZIUS MUSCLE SPASM: Primary | ICD-10-CM

## 2022-09-12 PROCEDURE — 99213 OFFICE O/P EST LOW 20 MIN: CPT

## 2022-09-12 RX ORDER — PROGESTERONE 50 MG/ML
INJECTION, SOLUTION INTRAMUSCULAR
COMMUNITY
Start: 2022-05-25

## 2022-09-12 RX ORDER — NORGESTIMATE AND ETHINYL ESTRADIOL 0.25-0.035
1 KIT ORAL DAILY
COMMUNITY
Start: 2022-05-25

## 2022-09-12 RX ORDER — LIDOCAINE AND PRILOCAINE 25; 25 MG/G; MG/G
CREAM TOPICAL
COMMUNITY
Start: 2022-05-25

## 2022-09-12 RX ORDER — KETOCONAZOLE 20 MG/G
CREAM TOPICAL
COMMUNITY
Start: 2022-06-28

## 2022-09-12 RX ORDER — ESTRADIOL 2 MG/1
TABLET ORAL
COMMUNITY
Start: 2022-08-29

## 2022-09-12 RX ORDER — BLOOD SUGAR DIAGNOSTIC
STRIP MISCELLANEOUS
COMMUNITY
Start: 2022-08-09

## 2022-09-12 NOTE — PATIENT INSTRUCTIONS
OTC Tylenol as needed  Heat/Ice for comfort  Follow up with PCP in 3-5 days  Proceed to ER if symptoms worsen  Acute Neck Pain   AMBULATORY CARE:   Acute neck pain  starts suddenly, increases quickly, and goes away in a few days  The pain may come and go, or be worse with certain movements  The pain may be only in your neck, or it may move to your arms, back, or shoulders  You may also have pain that starts in another body area and moves to your neck  Seek care immediately if:   You have an injury that causes neck pain and shooting pain down your arms or legs  Your neck pain suddenly becomes severe  You have neck pain along with numbness, tingling, or weakness in your arms or legs  You have a stiff neck, a headache, and a fever  Call your doctor if:   You have new or worsening symptoms  Your symptoms continue even after treatment  You have questions or concerns about your condition or care  Treatment  may include any of the following, depending on what is causing your pain:  Medicines  may be prescribed or recommended for pain  You may need medicine to treat nerve pain or to stop muscle spasms  Medicines may also be given to reduce inflammation  Your healthcare provider may inject medicine into a nerve to block pain  Over-the-counter NSAID medicine or acetaminophen may be recommended to help treat minor pain or inflammation  Traction  is used to relieve pressure from nerves  Your head is gently pulled up and away from your neck  This stretches muscles and ligaments and makes more room for the spine  Your healthcare provider will tell you the kind of traction that will help your neck pain  Do not use traction devices at home unless directed by your healthcare provider  Manage or prevent acute neck pain:   Rest your neck as directed  Do not make sudden movements, such as turning your head quickly   Your healthcare provider may recommend you wear a cervical collar for a short time  The collar will prevent you from moving your head  This will give your neck time to heal if an injury is causing your neck pain  Ask your healthcare provider when you can return to sports or other normal daily activities  Apply heat as directed  Heat helps relieve pain and swelling  Use a heat wrap, or soak a small towel in warm water  Wring out the extra water  Apply the heat wrap or towel for 20 minutes every hour, or as directed  Apply ice as directed  Ice helps relieve pain and swelling, and can help prevent tissue damage  Use an ice pack, or put ice in a bag  Cover the ice pack or back with a towel before you apply it to your neck  Apply the ice pack or ice for 15 minutes every hour, or as directed  Your healthcare provider can tell you how often to apply ice  Do neck exercises as directed  Neck exercises help strengthen the muscles and increase range of motion  Your healthcare provider will tell you which exercises are right for you  He or she may give you instructions or recommend that you work with a physical therapist  Your healthcare provider or therapist can make sure you are doing the exercises correctly  Maintain good posture  Try to keep your head and shoulders lifted when you sit  If you work in front of a computer, make sure the monitor is at the right level  You should not need to look up down to see the screen  You should also not have to lean forward to be able to read what is on the screen  Make sure your keyboard, mouse, and other computer items are placed where you do not have to extend your shoulder to reach them  Get up often if you work in front of a computer or sit for long periods of time  Stretch or walk around to keep your neck muscles loose  Follow up with your doctor as directed:  He or she may refer you to a specialist if your pain does not get better with treatment  Write down your questions so you remember to ask them during your visits    © Copyright [employer] 2022 Information is for [employer] use only and may not be sold, redistributed or otherwise used for commercial purposes  All illustrations and images included in CareNotes® are the copyrighted property of [employer]  or [employer]  The above information is an  only  It is not intended as medical advice for individual conditions or treatments  Talk to your doctor, nurse or pharmacist before following any medical regimen to see if it is safe and effective for you

## 2022-09-12 NOTE — PROGRESS NOTES
[facility]        NAME: Mynor Roberts is a 27 y o  female  : 1990    MRN: [id number]  DATE: 2022  TIME: 5:00 PM    Assessment and Plan   Trapezius muscle spasm [M62 838]  1  Trapezius muscle spasm         Due to the possible chance of pregnancy, limited as to what can be prescribed for the patient  Patient instructed to continue with OTC Tylenol and Ice/Heat as needed for discomfort  Patient Instructions     Patient Instructions     OTC Tylenol as needed  Heat/Ice for comfort  Follow up with PCP in 3-5 days  Proceed to ER if symptoms worsen  Acute Neck Pain   AMBULATORY CARE:   Acute neck pain  starts suddenly, increases quickly, and goes away in a few days  The pain may come and go, or be worse with certain movements  The pain may be only in your neck, or it may move to your arms, back, or shoulders  You may also have pain that starts in another body area and moves to your neck  Seek care immediately if:   · You have an injury that causes neck pain and shooting pain down your arms or legs  · Your neck pain suddenly becomes severe  · You have neck pain along with numbness, tingling, or weakness in your arms or legs  · You have a stiff neck, a headache, and a fever  Call your doctor if:   · You have new or worsening symptoms  · Your symptoms continue even after treatment  · You have questions or concerns about your condition or care  Treatment  may include any of the following, depending on what is causing your pain:  · Medicines  may be prescribed or recommended for pain  You may need medicine to treat nerve pain or to stop muscle spasms  Medicines may also be given to reduce inflammation  Your healthcare provider may inject medicine into a nerve to block pain  Over-the-counter NSAID medicine or acetaminophen may be recommended to help treat minor pain or inflammation  · Traction  is used to relieve pressure from nerves   Your head is gently pulled up and away from your neck  This stretches muscles and ligaments and makes more room for the spine  Your healthcare provider will tell you the kind of traction that will help your neck pain  Do not use traction devices at home unless directed by your healthcare provider  Manage or prevent acute neck pain:   · Rest your neck as directed  Do not make sudden movements, such as turning your head quickly  Your healthcare provider may recommend you wear a cervical collar for a short time  The collar will prevent you from moving your head  This will give your neck time to heal if an injury is causing your neck pain  Ask your healthcare provider when you can return to sports or other normal daily activities  · Apply heat as directed  Heat helps relieve pain and swelling  Use a heat wrap, or soak a small towel in warm water  Wring out the extra water  Apply the heat wrap or towel for 20 minutes every hour, or as directed  · Apply ice as directed  Ice helps relieve pain and swelling, and can help prevent tissue damage  Use an ice pack, or put ice in a bag  Cover the ice pack or back with a towel before you apply it to your neck  Apply the ice pack or ice for 15 minutes every hour, or as directed  Your healthcare provider can tell you how often to apply ice  · Do neck exercises as directed  Neck exercises help strengthen the muscles and increase range of motion  Your healthcare provider will tell you which exercises are right for you  He or she may give you instructions or recommend that you work with a physical therapist  Your healthcare provider or therapist can make sure you are doing the exercises correctly  · Maintain good posture  Try to keep your head and shoulders lifted when you sit  If you work in front of a computer, make sure the monitor is at the right level  You should not need to look up down to see the screen   You should also not have to lean forward to be able to read what is on the screen  Make sure your keyboard, mouse, and other computer items are placed where you do not have to extend your shoulder to reach them  Get up often if you work in front of a computer or sit for long periods of time  Stretch or walk around to keep your neck muscles loose  Follow up with your doctor as directed:  He or she may refer you to a specialist if your pain does not get better with treatment  Write down your questions so you remember to ask them during your visits  © Copyright [employer] 2022 Information is for End User's use only and may not be sold, redistributed or otherwise used for commercial purposes  All illustrations and images included in CareNotes® are the copyrighted property of A D [employer] M [employer] Inc  or [employer]   The above information is an  only  It is not intended as medical advice for individual conditions or treatments  Talk to your doctor, nurse or pharmacist before following any medical regimen to see if it is safe and effective for you  Chief Complaint     Chief Complaint   Patient presents with    Neck Pain     Patient C/O pain that starts in her R shoulder radiating up her neck and head  History of Present Illness       13-year-old female presents with complaints of right-sided neck and shoulder pain since Friday  She feels the pain goes up into the bottom of her head  Denies any known injury or trauma  Denies any upper extremity numbness, tingling, or weakness  She had a recent embryo transfer and due to the possible chance of pregnancy she has only been using Tylenol, heat, and ice  She is wondering if there is more we can do for her  Neck Pain   This is a new problem  The current episode started in the past 7 days  The problem occurs constantly  The problem has been gradually worsening  The pain is present in the right side  The pain is moderate  Pertinent negatives include no chest pain or fever   She has tried acetaminophen for the symptoms  The treatment provided no relief  Review of Systems   Review of Systems   Constitutional: Negative for chills and fever  Respiratory: Negative for chest tightness and shortness of breath  Cardiovascular: Negative for chest pain  Gastrointestinal: Negative for diarrhea, nausea and vomiting  Musculoskeletal: Positive for myalgias and neck pain  Negative for back pain, gait problem and neck stiffness  Skin: Negative for color change and rash           Current Medications       Current Outpatient Medications:     Insulin Syringe-Needle U-100 29G X 1/2" 0 5 ML MISC, Use as directed, Disp: , Rfl:     lidocaine-prilocaine (EMLA) cream, Apply to injection site one hour prior to injection as needed, Disp: , Rfl:     norgestimate-ethinyl estradiol (ORTHO-CYCLEN) 0 25-35 MG-MCG per tablet, Take 1 tablet by mouth daily, Disp: , Rfl:     progesterone 50 mg/mL injection, Inject 1ml IM daily as directed, Disp: , Rfl:     Ascorbic Acid (VITAMIN C) 100 MG tablet, Vitamin C, Disp: , Rfl:     Cholecalciferol (VITAMIN D) 50 MCG (2000 UT) CAPS, Vitamin D, Disp: , Rfl:     Ciclopirox 1 % shampoo, APPLY TO SCALP AS DIRECTED 2-3 TIMES A WEEK AS NEEDED (Patient not taking: Reported on 1/12/2022), Disp: , Rfl: 1    ciprofloxacin-dexamethasone (CIPRODEX) otic suspension, Administer 4 drops to the right ear 2 (two) times a day for 7 days, Disp: 7 5 mL, Rfl: 0    estradiol (ESTRACE) 2 MG tablet, , Disp: , Rfl:     fluticasone (FLONASE) 50 mcg/act nasal spray, 1 spray into each nostril daily (Patient not taking: Reported on 1/12/2022 ), Disp: , Rfl:     ketoconazole (NIZORAL) 2 % cream, APPLY A THIN LAYER TO AFFECTED AREAS TWICE A DAY FOR 6 WEEKS, Disp: , Rfl:     multivitamin (THERAGRAN) TABS, Take 1 tablet by mouth daily, Disp: , Rfl:     phenazopyridine (PYRIDIUM) 200 mg tablet, Take 1 tablet (200 mg total) by mouth every 8 (eight) hours as needed for bladder spasms, Disp: 10 tablet, Rfl: 0   PRENATAL VIT-FE FUMARATE-FA PO, Take by mouth daily, Disp: , Rfl:     Probiotic Product (PROBIOTIC-10 PO), Probiotic, Disp: , Rfl:     tretinoin (RETIN-A) 0 025 % cream, APPLY A PEA SIZE AMOUNT AS DIRECTED TO ACNE AREAS AT BEDTIME, Disp: , Rfl: 2    Current Allergies     Allergies as of 09/12/2022 - Reviewed 09/12/2022   Allergen Reaction Noted    Bee pollen Other (See Comments) 02/18/2015    Pollen extract  02/18/2015            The following portions of the patient's history were reviewed and updated as appropriate: allergies, current medications, past family history, past medical history, past social history, past surgical history and problem list      Past Medical History:   Diagnosis Date    Kidney stone     Nephrolithiasis 7/10/2018       Past Surgical History:   Procedure Laterality Date    [place] CYSTO/URETERO W/LITHOTRIPSY &INDWELL STENT INSRT Right 7/11/2018    Procedure: CYSTOSCOPY URETEROSCOPY; RETROGRADE PYELOGRAM AND INSERTION STENT URETERAL;  Surgeon: Hailey Brunner MD;  Location: [facility] OR;  Service: Urology       Family History   Problem Relation Age of Onset    Nephrolithiasis Father     No Known Problems Mother          Medications have been verified  Objective   /86   Pulse 92   Temp 98 5 °F (36 9 °C)   Resp 18   SpO2 98%   No LMP recorded  Physical Exam     Physical Exam  Vitals and nursing note reviewed  Constitutional:       General: She is not in acute distress  Appearance: She is not toxic-appearing  HENT:      Head: Normocephalic and atraumatic  Nose: Nose normal    Eyes:      Conjunctiva/sclera: Conjunctivae normal    Cardiovascular:      Rate and Rhythm: Normal rate and regular rhythm  Heart sounds: Normal heart sounds  Pulmonary:      Effort: Pulmonary effort is normal  No respiratory distress  Breath sounds: Normal breath sounds  Musculoskeletal:      Cervical back: Neck supple  No rigidity or bony tenderness        Thoracic back: Normal       Lumbar back: Normal       Comments: Right-sided trapezius muscle tenderness and swelling  Skin:     General: Skin is warm and dry  Neurological:      Mental Status: She is alert and oriented to person, place, and time     Psychiatric:         Mood and Affect: Mood normal          Behavior: Behavior normal

## 2022-09-20 ENCOUNTER — OFFICE VISIT (OUTPATIENT)
Dept: FAMILY MEDICINE CLINIC | Facility: CLINIC | Age: 32
End: 2022-09-20
Payer: COMMERCIAL

## 2022-09-20 VITALS
BODY MASS INDEX: 29.52 KG/M2 | OXYGEN SATURATION: 99 % | SYSTOLIC BLOOD PRESSURE: 136 MMHG | WEIGHT: 194.8 LBS | HEART RATE: 126 BPM | TEMPERATURE: 97.9 F | HEIGHT: 68 IN | DIASTOLIC BLOOD PRESSURE: 80 MMHG

## 2022-09-20 DIAGNOSIS — G44.229 CHRONIC TENSION-TYPE HEADACHE, NOT INTRACTABLE: Primary | ICD-10-CM

## 2022-09-20 DIAGNOSIS — F43.10 PTSD (POST-TRAUMATIC STRESS DISORDER): ICD-10-CM

## 2022-09-20 DIAGNOSIS — Z11.59 NEED FOR HEPATITIS C SCREENING TEST: ICD-10-CM

## 2022-09-20 DIAGNOSIS — Z11.4 SCREENING FOR HIV (HUMAN IMMUNODEFICIENCY VIRUS): ICD-10-CM

## 2022-09-20 PROCEDURE — 3725F SCREEN DEPRESSION PERFORMED: CPT | Performed by: FAMILY MEDICINE

## 2022-09-20 PROCEDURE — 99214 OFFICE O/P EST MOD 30 MIN: CPT | Performed by: FAMILY MEDICINE

## 2022-09-20 PROCEDURE — 96372 THER/PROPH/DIAG INJ SC/IM: CPT | Performed by: FAMILY MEDICINE

## 2022-09-20 RX ORDER — PEN NEEDLE, DIABETIC 29 G X1/2"
NEEDLE, DISPOSABLE MISCELLANEOUS
COMMUNITY
Start: 2022-08-09

## 2022-09-20 RX ORDER — KETOROLAC TROMETHAMINE 30 MG/ML
60 INJECTION, SOLUTION INTRAMUSCULAR; INTRAVENOUS ONCE
Status: COMPLETED | OUTPATIENT
Start: 2022-09-20 | End: 2022-09-20

## 2022-09-20 RX ORDER — ESCITALOPRAM OXALATE 10 MG/1
10 TABLET ORAL DAILY
Qty: 90 TABLET | Refills: 0 | Status: SHIPPED | OUTPATIENT
Start: 2022-09-20 | End: 2022-10-13 | Stop reason: SDUPTHER

## 2022-09-20 RX ORDER — PREDNISONE 10 MG/1
TABLET ORAL
Qty: 33 TABLET | Refills: 0 | Status: SHIPPED | OUTPATIENT
Start: 2022-09-20

## 2022-09-20 RX ORDER — CYCLOBENZAPRINE HCL 10 MG
10 TABLET ORAL
Qty: 20 TABLET | Refills: 0 | Status: SHIPPED | OUTPATIENT
Start: 2022-09-20

## 2022-09-20 RX ORDER — ESTRADIOL 2 MG/1
TABLET ORAL
COMMUNITY
Start: 2022-08-29

## 2022-09-20 RX ADMIN — KETOROLAC TROMETHAMINE 60 MG: 30 INJECTION, SOLUTION INTRAMUSCULAR; INTRAVENOUS at 15:15

## 2022-09-20 NOTE — PROGRESS NOTES
Assessment/Plan:  Patient opted for Toradol injection  Side effect profile medication reviewed  Recommend return to office for recheck if no improvement or worsening symptoms  Consider imaging if needed  She will call if symptoms persist or worsen  Recommended considering counseling  They will consider this  1  Chronic tension-type headache, not intractable  -     cyclobenzaprine (FLEXERIL) 10 mg tablet; Take 1 tablet (10 mg total) by mouth daily at bedtime  -     predniSONE 10 mg tablet; 6 tablets daily, all at one time, with food  Then on day #4 decrease by 1 pill each day until finished   -     ketorolac (TORADOL) 60 mg/2 mL IM injection 60 mg    2  Need for hepatitis C screening test  -     Hepatitis C Antibody ([facility]); Future    3  Screening for HIV (human immunodeficiency virus)  -     HIV 1/2 Antigen/Antibody (4th Generation) w Reflex [facility]; Future    4  PTSD (post-traumatic stress disorder)  Assessment & Plan:  Recommend Lexapro 10 mg daily  Side effect profile medication reviewed  If she were to get pregnant she could wean off of the medication or in preparation for a new infertility implantation at times  She will call if any new persisting or worsening symptoms  Recommend considering following up with couple or individual therapist     Orders:  -     escitalopram (LEXAPRO) 10 mg tablet; Take 1 tablet (10 mg total) by mouth daily For anxiety        Subjective:      Patient ID: Catia Blackwood is a 27 y o  female  Patient with multiple concerns today  As she is concerned about stress  She and her  are trying to get pregnant and are seeing a fertility specialist and they have failed multiple implantation trials and just recently had a failed attempt  She states that there considering counseling  She is feeling anxious and sad about this situation  She has a difficult times sleeping and some anhedonia      She also has had chronic headaches over the last 1 week over the stress  She has tried over-the-counter medication without much success  The following portions of the patient's history were reviewed and updated as appropriate: allergies, current medications, past family history, past medical history, past social history, past surgical history, and problem list     Review of Systems   Constitutional: Negative  HENT: Negative  Eyes: Negative  Respiratory: Negative  Cardiovascular: Negative  Gastrointestinal: Negative  Endocrine: Negative  Genitourinary: Negative  Musculoskeletal: Negative  Skin: Negative  Allergic/Immunologic: Negative  Neurological: Positive for headaches  Hematological: Negative  Psychiatric/Behavioral: Positive for dysphoric mood  The patient is nervous/anxious  Objective:      /80 (BP Location: Left arm, Patient Position: Sitting, Cuff Size: Standard)   Pulse (!) 126   Temp 97 9 °F (36 6 °C) (Temporal)   Ht 5' 8" (1 727 m)   Wt 88 4 kg (194 lb 12 8 oz)   SpO2 99%   BMI 29 62 kg/m²          Physical Exam  Vitals reviewed  Constitutional:       Appearance: She is well-developed  HENT:      Head: Normocephalic and atraumatic  Right Ear: External ear normal  Tympanic membrane is not erythematous or bulging  Left Ear: External ear normal  Tympanic membrane is not erythematous or bulging  Nose: Nose normal       Mouth/Throat:      Mouth: No oral lesions  Pharynx: No oropharyngeal exudate  Eyes:      General: No scleral icterus  Right eye: No discharge  Left eye: No discharge  Conjunctiva/sclera: Conjunctivae normal    Neck:      Thyroid: No thyromegaly  Cardiovascular:      Rate and Rhythm: Normal rate and regular rhythm  Heart sounds: Normal heart sounds  No murmur heard  No friction rub  No gallop  Pulmonary:      Effort: Pulmonary effort is normal  No respiratory distress  Breath sounds: No wheezing or rales     Chest:      Chest wall: No tenderness  Abdominal:      General: Bowel sounds are normal  There is no distension  Palpations: Abdomen is soft  There is no mass  Tenderness: There is no abdominal tenderness  There is no guarding or rebound  Musculoskeletal:         General: No tenderness or deformity  Normal range of motion  Cervical back: Normal range of motion and neck supple  Lymphadenopathy:      Cervical: No cervical adenopathy  Skin:     General: Skin is warm and dry  Coloration: Skin is not pale  Findings: No erythema or rash  Neurological:      Mental Status: She is alert and oriented to person, place, and time  Cranial Nerves: No cranial nerve deficit  Motor: No abnormal muscle tone  Coordination: Coordination normal       Deep Tendon Reflexes: Reflexes are normal and symmetric     Psychiatric:         Behavior: Behavior normal

## 2022-09-20 NOTE — ASSESSMENT & PLAN NOTE
Recommend Lexapro 10 mg daily  Side effect profile medication reviewed  If she were to get pregnant she could wean off of the medication or in preparation for a new infertility implantation at times  She will call if any new persisting or worsening symptoms    Recommend considering following up with couple or individual therapist

## 2022-09-20 NOTE — PROGRESS NOTES
BMI Counseling: Body mass index is 29 62 kg/m²  The BMI is above normal  Nutrition recommendations include reducing portion sizes

## 2022-10-12 PROBLEM — H60.501 ACUTE OTITIS EXTERNA OF RIGHT EAR: Status: RESOLVED | Noted: 2022-01-12 | Resolved: 2022-10-12

## 2022-10-13 DIAGNOSIS — F43.10 PTSD (POST-TRAUMATIC STRESS DISORDER): ICD-10-CM

## 2022-10-13 RX ORDER — ESCITALOPRAM OXALATE 10 MG/1
10 TABLET ORAL DAILY
Qty: 90 TABLET | Refills: 0 | Status: SHIPPED | OUTPATIENT
Start: 2022-10-13 | End: 2023-01-11

## 2022-12-28 DIAGNOSIS — F43.10 PTSD (POST-TRAUMATIC STRESS DISORDER): ICD-10-CM

## 2022-12-28 RX ORDER — ESCITALOPRAM OXALATE 10 MG/1
10 TABLET ORAL DAILY
Qty: 90 TABLET | Refills: 0 | Status: SHIPPED | OUTPATIENT
Start: 2022-12-28 | End: 2023-03-28

## 2023-01-04 ENCOUNTER — OFFICE VISIT (OUTPATIENT)
Dept: URGENT CARE | Facility: MEDICAL CENTER | Age: 33
End: 2023-01-04

## 2023-01-04 VITALS
OXYGEN SATURATION: 99 % | RESPIRATION RATE: 18 BRPM | TEMPERATURE: 98.6 F | HEART RATE: 88 BPM | DIASTOLIC BLOOD PRESSURE: 72 MMHG | SYSTOLIC BLOOD PRESSURE: 128 MMHG

## 2023-01-04 DIAGNOSIS — R31.9 HEMATURIA, UNSPECIFIED TYPE: Primary | ICD-10-CM

## 2023-01-04 LAB
SL AMB  POCT GLUCOSE, UA: ABNORMAL
SL AMB LEUKOCYTE ESTERASE,UA: ABNORMAL
SL AMB POCT BILIRUBIN,UA: ABNORMAL
SL AMB POCT BLOOD,UA: ABNORMAL
SL AMB POCT CLARITY,UA: ABNORMAL
SL AMB POCT COLOR,UA: ABNORMAL
SL AMB POCT KETONES,UA: ABNORMAL
SL AMB POCT NITRITE,UA: ABNORMAL
SL AMB POCT PH,UA: 6.5
SL AMB POCT SPECIFIC GRAVITY,UA: 1.05
SL AMB POCT URINE PROTEIN: ABNORMAL
SL AMB POCT UROBILINOGEN: ABNORMAL

## 2023-01-04 RX ORDER — NAPROXEN 500 MG/1
500 TABLET ORAL 2 TIMES DAILY WITH MEALS
Qty: 14 TABLET | Refills: 0 | Status: SHIPPED | OUTPATIENT
Start: 2023-01-04 | End: 2023-01-11

## 2023-01-04 RX ORDER — TAMSULOSIN HYDROCHLORIDE 0.4 MG/1
0.4 CAPSULE ORAL
Qty: 5 CAPSULE | Refills: 0 | Status: SHIPPED | OUTPATIENT
Start: 2023-01-04 | End: 2023-01-09

## 2023-01-04 NOTE — PROGRESS NOTES
[facility]        NAME: Lance Walker is a 27 y o  female  : 1990    MRN: [id number]  DATE: 2023  TIME: 11:51 AM    Assessment and Plan   Hematuria, unspecified type [R31 9]  1  Hematuria, unspecified type  POCT urine dip    tamsulosin (FLOMAX) 0 4 mg    naproxen (EC NAPROSYN) 500 MG EC tablet        Patient presents with c/o left sided back pain for 2 days  History of kidney stones  Noticed blood in urine  Attempted to get in with urology but not appt until end       POCT urine indicates blood  No leuks  Left sided back pain  Will order flomax and naprosyn  Encouraged fluids and ER f/u for severe pain    Patient Instructions       Follow up with PCP as needed    Chief Complaint     Chief Complaint   Patient presents with   • Blood in Urine     Pt C/O back pain for the last few days with blood in the urine today  Pt reports hx of kidney stones, tired to make a urology appt and was unable  History of Present Illness       Patient presents with c/o left sided back pain for 2 days  History of kidney stones  Noticed blood in urine  Attempted to get in with urology but not appt until end       POCT urine indicates blood  No leuks  Left sided back pain  Will order flomax and naprosyn  Encouraged fluids and ER f/u for severe pain   Review of Systems   Review of Systems   Constitutional: Negative for chills and fever  HENT: Negative for congestion, ear pain, postnasal drip, sinus pain and sore throat  Eyes: Negative for pain and itching  Respiratory: Negative for cough, shortness of breath and wheezing  Cardiovascular: Negative for chest pain and palpitations  Gastrointestinal: Negative for abdominal pain, constipation, diarrhea, nausea and vomiting  Genitourinary: Positive for hematuria  Negative for difficulty urinating  Musculoskeletal: Positive for back pain  Negative for arthralgias and myalgias  Skin: Negative for rash     Neurological: Negative for dizziness, light-headedness and headaches  Psychiatric/Behavioral: Negative for agitation and sleep disturbance  The patient is not nervous/anxious            Current Medications       Current Outpatient Medications:   •  Ascorbic Acid (VITAMIN C) 100 MG tablet, Vitamin C, Disp: , Rfl:   •  Cholecalciferol (VITAMIN D) 50 MCG (2000 UT) CAPS, Vitamin D, Disp: , Rfl:   •  escitalopram (LEXAPRO) 10 mg tablet, Take 1 tablet (10 mg total) by mouth daily For anxiety, Disp: 90 tablet, Rfl: 0  •  multivitamin (THERAGRAN) TABS, Take 1 tablet by mouth daily, Disp: , Rfl:   •  naproxen (EC NAPROSYN) 500 MG EC tablet, Take 1 tablet (500 mg total) by mouth 2 (two) times a day with meals for 7 days, Disp: 14 tablet, Rfl: 0  •  PRENATAL VIT-FE FUMARATE-FA PO, Take by mouth daily, Disp: , Rfl:   •  Probiotic Product (PROBIOTIC-10 PO), Probiotic, Disp: , Rfl:   •  tamsulosin (FLOMAX) 0 4 mg, Take 1 capsule (0 4 mg total) by mouth daily with dinner for 5 days, Disp: 5 capsule, Rfl: 0  •  BD Insulin Syringe U/F 30G X 1/2" 0 5 ML MISC, , Disp: , Rfl:   •  Ciclopirox 1 % shampoo, APPLY TO SCALP AS DIRECTED 2-3 TIMES A WEEK AS NEEDED (Patient not taking: No sig reported), Disp: , Rfl: 1  •  ciprofloxacin-dexamethasone (CIPRODEX) otic suspension, Administer 4 drops to the right ear 2 (two) times a day for 7 days (Patient not taking: Reported on 9/20/2022), Disp: 7 5 mL, Rfl: 0  •  cyclobenzaprine (FLEXERIL) 10 mg tablet, Take 1 tablet (10 mg total) by mouth daily at bedtime (Patient not taking: Reported on 1/4/2023), Disp: 20 tablet, Rfl: 0  •  estradiol (ESTRACE) 2 MG tablet, , Disp: , Rfl:   •  estradiol (ESTRACE) 2 MG tablet, Take 1 tablet by mouth three times a day as directed + 1/2 tablet vaginally nightly (Patient not taking: Reported on 9/20/2022), Disp: , Rfl:   •  fluticasone (FLONASE) 50 mcg/act nasal spray, 1 spray into each nostril daily (Patient not taking: Reported on 1/12/2022), Disp: , Rfl:   •  Insulin Syringe-Needle U-100 29G X 1/2" 0 5 ML MISC, Use as directed (Patient not taking: Reported on 9/20/2022), Disp: , Rfl:   •  ketoconazole (NIZORAL) 2 % cream, APPLY A THIN LAYER TO AFFECTED AREAS TWICE A DAY FOR 6 WEEKS (Patient not taking: Reported on 9/20/2022), Disp: , Rfl:   •  Leuprolide Acetate 5 MG/ML KIT, Inject 10iu subcutaneous daily as directed (Patient not taking: Reported on 9/20/2022), Disp: , Rfl:   •  lidocaine-prilocaine (EMLA) cream, Apply to injection site one hour prior to injection as needed (Patient not taking: Reported on 9/20/2022), Disp: , Rfl:   •  norgestimate-ethinyl estradiol (ORTHO-CYCLEN) 0 25-35 MG-MCG per tablet, Take 1 tablet by mouth daily (Patient not taking: Reported on 9/20/2022), Disp: , Rfl:   •  phenazopyridine (PYRIDIUM) 200 mg tablet, Take 1 tablet (200 mg total) by mouth every 8 (eight) hours as needed for bladder spasms (Patient not taking: Reported on 9/20/2022), Disp: 10 tablet, Rfl: 0  •  predniSONE 10 mg tablet, 6 tablets daily, all at one time, with food  Then on day #4 decrease by 1 pill each day until finished   (Patient not taking: Reported on 1/4/2023), Disp: 33 tablet, Rfl: 0  •  progesterone 50 mg/mL injection, Inject 1ml IM daily as directed (Patient not taking: Reported on 9/20/2022), Disp: , Rfl:   •  tretinoin (RETIN-A) 0 025 % cream, APPLY A PEA SIZE AMOUNT AS DIRECTED TO ACNE AREAS AT BEDTIME (Patient not taking: Reported on 9/20/2022), Disp: , Rfl: 2    Current Allergies     Allergies as of 01/04/2023 - Reviewed 01/04/2023   Allergen Reaction Noted   • Bee pollen Other (See Comments) 02/18/2015   • Pollen extract  02/18/2015            The following portions of the patient's history were reviewed and updated as appropriate: allergies, current medications, past family history, past medical history, past social history, past surgical history and problem list      Past Medical History:   Diagnosis Date   • Kidney stone    • Nephrolithiasis 7/10/2018       Past Surgical History:   Procedure Laterality Date   • [place] CYSTO/URETERO W/LITHOTRIPSY &INDWELL STENT INSRT Right 7/11/2018    Procedure: CYSTOSCOPY URETEROSCOPY; RETROGRADE PYELOGRAM AND INSERTION STENT URETERAL;  Surgeon: Unique Walters MD;  Location: [facility];  Service: Urology       Family History   Problem Relation Age of Onset   • Nephrolithiasis Father    • No Known Problems Mother          Medications have been verified  Objective   /72 (BP Location: Right arm, Patient Position: Sitting, Cuff Size: Large)   Pulse 88   Temp 98 6 °F (37 °C) (Tympanic)   Resp 18   LMP 12/29/2022   SpO2 99%   Patient's last menstrual period was 12/29/2022  Physical Exam     Physical Exam  Vitals reviewed  Constitutional:       General: She is not in acute distress  Appearance: Normal appearance  She is not ill-appearing  HENT:      Head: Normocephalic and atraumatic  Eyes:      Extraocular Movements: Extraocular movements intact  Conjunctiva/sclera: Conjunctivae normal    Pulmonary:      Effort: Pulmonary effort is normal    Abdominal:      Tenderness: There is left CVA tenderness  Skin:     General: Skin is warm  Neurological:      General: No focal deficit present  Mental Status: She is alert     Psychiatric:         Mood and Affect: Mood normal          Behavior: Behavior normal          Judgment: Judgment normal

## 2023-01-22 ENCOUNTER — HOSPITAL ENCOUNTER (OUTPATIENT)
Facility: HOSPITAL | Age: 33
Setting detail: OBSERVATION
Discharge: HOME | End: 2023-01-23
Attending: EMERGENCY MEDICINE | Admitting: FAMILY MEDICINE

## 2023-01-22 ENCOUNTER — APPOINTMENT (EMERGENCY)
Dept: CT IMAGING | Facility: HOSPITAL | Age: 33
End: 2023-01-22

## 2023-01-22 DIAGNOSIS — N20.0 RECURRENT NEPHROLITHIASIS: ICD-10-CM

## 2023-01-22 DIAGNOSIS — N20.1 RIGHT URETERAL STONE: Primary | ICD-10-CM

## 2023-01-22 DIAGNOSIS — N39.0 UTI (URINARY TRACT INFECTION): ICD-10-CM

## 2023-01-22 PROBLEM — N30.01 ACUTE CYSTITIS WITH HEMATURIA: Status: ACTIVE | Noted: 2023-01-22

## 2023-01-22 LAB
ANION GAP SERPL CALCULATED.3IONS-SCNC: 8 MMOL/L (ref 4–13)
BACTERIA UR QL AUTO: ABNORMAL /HPF
BASOPHILS # BLD AUTO: 0.03 THOUSANDS/ÂΜL (ref 0–0.1)
BASOPHILS NFR BLD AUTO: 0 % (ref 0–1)
BILIRUB UR QL STRIP: NEGATIVE
BUN SERPL-MCNC: 19 MG/DL (ref 5–25)
CALCIUM SERPL-MCNC: 9.3 MG/DL (ref 8.4–10.2)
CHLORIDE SERPL-SCNC: 104 MMOL/L (ref 96–108)
CLARITY UR: CLEAR
CO2 SERPL-SCNC: 26 MMOL/L (ref 21–32)
COLOR UR: YELLOW
CREAT SERPL-MCNC: 0.94 MG/DL (ref 0.6–1.3)
EOSINOPHIL # BLD AUTO: 0 THOUSAND/ÂΜL (ref 0–0.61)
EOSINOPHIL NFR BLD AUTO: 0 % (ref 0–6)
ERYTHROCYTE [DISTWIDTH] IN BLOOD BY AUTOMATED COUNT: 13.4 % (ref 11.6–15.1)
EXT PREGNANCY TEST URINE: NEGATIVE
EXT. CONTROL: NORMAL
FLUAV RNA RESP QL NAA+PROBE: NEGATIVE
FLUBV RNA RESP QL NAA+PROBE: NEGATIVE
GFR SERPL CREATININE-BSD FRML MDRD: 79 ML/MIN/1.73SQ M
GLUCOSE SERPL-MCNC: 119 MG/DL (ref 65–140)
GLUCOSE UR STRIP-MCNC: NEGATIVE MG/DL
HCT VFR BLD AUTO: 38.6 % (ref 34.8–46.1)
HGB BLD-MCNC: 12.4 G/DL (ref 11.5–15.4)
HGB UR QL STRIP.AUTO: ABNORMAL
IMM GRANULOCYTES # BLD AUTO: 0.04 THOUSAND/UL (ref 0–0.2)
IMM GRANULOCYTES NFR BLD AUTO: 0 % (ref 0–2)
KETONES UR STRIP-MCNC: ABNORMAL MG/DL
LEUKOCYTE ESTERASE UR QL STRIP: ABNORMAL
LYMPHOCYTES # BLD AUTO: 1.08 THOUSANDS/ÂΜL (ref 0.6–4.47)
LYMPHOCYTES NFR BLD AUTO: 10 % (ref 14–44)
MCH RBC QN AUTO: 28.8 PG (ref 26.8–34.3)
MCHC RBC AUTO-ENTMCNC: 32.1 G/DL (ref 31.4–37.4)
MCV RBC AUTO: 90 FL (ref 82–98)
MONOCYTES # BLD AUTO: 0.27 THOUSAND/ÂΜL (ref 0.17–1.22)
MONOCYTES NFR BLD AUTO: 2 % (ref 4–12)
MUCOUS THREADS UR QL AUTO: ABNORMAL
NEUTROPHILS # BLD AUTO: 9.76 THOUSANDS/ÂΜL (ref 1.85–7.62)
NEUTS SEG NFR BLD AUTO: 88 % (ref 43–75)
NITRITE UR QL STRIP: NEGATIVE
NON-SQ EPI CELLS URNS QL MICRO: ABNORMAL /HPF
NRBC BLD AUTO-RTO: 0 /100 WBCS
PH UR STRIP.AUTO: 7 [PH] (ref 4.5–8)
PLATELET # BLD AUTO: 230 THOUSANDS/UL (ref 149–390)
PMV BLD AUTO: 10.8 FL (ref 8.9–12.7)
POTASSIUM SERPL-SCNC: 4.1 MMOL/L (ref 3.5–5.3)
PROT UR STRIP-MCNC: ABNORMAL MG/DL
RBC # BLD AUTO: 4.31 MILLION/UL (ref 3.81–5.12)
RBC #/AREA URNS AUTO: ABNORMAL /HPF
RSV RNA RESP QL NAA+PROBE: NEGATIVE
SARS-COV-2 RNA RESP QL NAA+PROBE: NEGATIVE
SODIUM SERPL-SCNC: 138 MMOL/L (ref 135–147)
SP GR UR STRIP.AUTO: 1.02 (ref 1–1.03)
UROBILINOGEN UR QL STRIP.AUTO: 1 E.U./DL
WBC # BLD AUTO: 11.18 THOUSAND/UL (ref 4.31–10.16)
WBC #/AREA URNS AUTO: ABNORMAL /HPF

## 2023-01-22 RX ORDER — OXYCODONE HYDROCHLORIDE 5 MG/1
5 TABLET ORAL EVERY 4 HOURS PRN
Status: DISCONTINUED | OUTPATIENT
Start: 2023-01-22 | End: 2023-01-23 | Stop reason: HOSPADM

## 2023-01-22 RX ORDER — MEPERIDINE HYDROCHLORIDE 25 MG/ML
12.5 INJECTION INTRAMUSCULAR; INTRAVENOUS; SUBCUTANEOUS ONCE AS NEEDED
Status: CANCELLED | OUTPATIENT
Start: 2023-01-22

## 2023-01-22 RX ORDER — OXYCODONE HYDROCHLORIDE 10 MG/1
10 TABLET ORAL EVERY 4 HOURS PRN
Status: DISCONTINUED | OUTPATIENT
Start: 2023-01-22 | End: 2023-01-23 | Stop reason: HOSPADM

## 2023-01-22 RX ORDER — KETOROLAC TROMETHAMINE 30 MG/ML
30 INJECTION, SOLUTION INTRAMUSCULAR; INTRAVENOUS EVERY 6 HOURS SCHEDULED
Status: COMPLETED | OUTPATIENT
Start: 2023-01-22 | End: 2023-01-22

## 2023-01-22 RX ORDER — TAMSULOSIN HYDROCHLORIDE 0.4 MG/1
0.4 CAPSULE ORAL
Status: DISCONTINUED | OUTPATIENT
Start: 2023-01-22 | End: 2023-01-23 | Stop reason: HOSPADM

## 2023-01-22 RX ORDER — FENTANYL CITRATE/PF 50 MCG/ML
50 SYRINGE (ML) INJECTION
Status: CANCELLED | OUTPATIENT
Start: 2023-01-22

## 2023-01-22 RX ORDER — ONDANSETRON 2 MG/ML
4 INJECTION INTRAMUSCULAR; INTRAVENOUS ONCE
Status: COMPLETED | OUTPATIENT
Start: 2023-01-22 | End: 2023-01-22

## 2023-01-22 RX ORDER — ESCITALOPRAM OXALATE 10 MG/1
10 TABLET ORAL DAILY
Status: DISCONTINUED | OUTPATIENT
Start: 2023-01-22 | End: 2023-01-23 | Stop reason: HOSPADM

## 2023-01-22 RX ORDER — ONDANSETRON 2 MG/ML
4 INJECTION INTRAMUSCULAR; INTRAVENOUS ONCE AS NEEDED
Status: CANCELLED | OUTPATIENT
Start: 2023-01-22

## 2023-01-22 RX ORDER — HYDROMORPHONE HCL/PF 1 MG/ML
1 SYRINGE (ML) INJECTION
Status: DISCONTINUED | OUTPATIENT
Start: 2023-01-22 | End: 2023-01-23 | Stop reason: HOSPADM

## 2023-01-22 RX ORDER — ONDANSETRON 2 MG/ML
4 INJECTION INTRAMUSCULAR; INTRAVENOUS EVERY 6 HOURS PRN
Status: DISCONTINUED | OUTPATIENT
Start: 2023-01-22 | End: 2023-01-23 | Stop reason: HOSPADM

## 2023-01-22 RX ORDER — HYDROMORPHONE HCL/PF 1 MG/ML
1 SYRINGE (ML) INJECTION ONCE
Status: COMPLETED | OUTPATIENT
Start: 2023-01-22 | End: 2023-01-22

## 2023-01-22 RX ORDER — ACETAMINOPHEN 325 MG/1
650 TABLET ORAL EVERY 6 HOURS PRN
Status: DISCONTINUED | OUTPATIENT
Start: 2023-01-22 | End: 2023-01-23 | Stop reason: HOSPADM

## 2023-01-22 RX ORDER — KETOROLAC TROMETHAMINE 30 MG/ML
15 INJECTION, SOLUTION INTRAMUSCULAR; INTRAVENOUS ONCE
Status: COMPLETED | OUTPATIENT
Start: 2023-01-22 | End: 2023-01-22

## 2023-01-22 RX ORDER — PROMETHAZINE HYDROCHLORIDE 25 MG/ML
6.25 INJECTION, SOLUTION INTRAMUSCULAR; INTRAVENOUS ONCE AS NEEDED
Status: CANCELLED | OUTPATIENT
Start: 2023-01-22

## 2023-01-22 RX ORDER — TAMSULOSIN HYDROCHLORIDE 0.4 MG/1
0.4 CAPSULE ORAL ONCE
Status: COMPLETED | OUTPATIENT
Start: 2023-01-22 | End: 2023-01-22

## 2023-01-22 RX ORDER — HYDROMORPHONE HCL/PF 1 MG/ML
0.5 SYRINGE (ML) INJECTION
Status: CANCELLED | OUTPATIENT
Start: 2023-01-22

## 2023-01-22 RX ORDER — SODIUM CHLORIDE 9 MG/ML
100 INJECTION, SOLUTION INTRAVENOUS CONTINUOUS
Status: DISPENSED | OUTPATIENT
Start: 2023-01-22 | End: 2023-01-22

## 2023-01-22 RX ORDER — OXYCODONE HYDROCHLORIDE 5 MG/1
5 TABLET ORAL EVERY 4 HOURS PRN
Status: DISCONTINUED | OUTPATIENT
Start: 2023-01-22 | End: 2023-01-22

## 2023-01-22 RX ORDER — CEFAZOLIN SODIUM 2 G/50ML
2000 SOLUTION INTRAVENOUS ONCE
OUTPATIENT
Start: 2023-01-23 | End: 2023-01-22

## 2023-01-22 RX ORDER — KETOROLAC TROMETHAMINE 30 MG/ML
30 INJECTION, SOLUTION INTRAMUSCULAR; INTRAVENOUS EVERY 8 HOURS
Status: DISCONTINUED | OUTPATIENT
Start: 2023-01-22 | End: 2023-01-22

## 2023-01-22 RX ADMIN — KETOROLAC TROMETHAMINE 30 MG: 30 INJECTION, SOLUTION INTRAMUSCULAR; INTRAVENOUS at 23:29

## 2023-01-22 RX ADMIN — ONDANSETRON 4 MG: 2 INJECTION INTRAMUSCULAR; INTRAVENOUS at 20:57

## 2023-01-22 RX ADMIN — KETOROLAC TROMETHAMINE 30 MG: 30 INJECTION, SOLUTION INTRAMUSCULAR; INTRAVENOUS at 15:58

## 2023-01-22 RX ADMIN — HYDROMORPHONE HYDROCHLORIDE 1 MG: 1 INJECTION, SOLUTION INTRAMUSCULAR; INTRAVENOUS; SUBCUTANEOUS at 22:47

## 2023-01-22 RX ADMIN — ONDANSETRON 4 MG: 2 INJECTION INTRAMUSCULAR; INTRAVENOUS at 14:47

## 2023-01-22 RX ADMIN — OXYCODONE HYDROCHLORIDE 5 MG: 5 TABLET ORAL at 14:04

## 2023-01-22 RX ADMIN — SODIUM CHLORIDE 100 ML/HR: 0.9 INJECTION, SOLUTION INTRAVENOUS at 13:53

## 2023-01-22 RX ADMIN — ONDANSETRON 4 MG: 2 INJECTION INTRAMUSCULAR; INTRAVENOUS at 10:43

## 2023-01-22 RX ADMIN — SODIUM CHLORIDE 100 ML/HR: 0.9 INJECTION, SOLUTION INTRAVENOUS at 18:19

## 2023-01-22 RX ADMIN — CEFTRIAXONE SODIUM 2000 MG: 10 INJECTION, POWDER, FOR SOLUTION INTRAVENOUS at 13:51

## 2023-01-22 RX ADMIN — OXYCODONE HYDROCHLORIDE 10 MG: 10 TABLET ORAL at 19:39

## 2023-01-22 RX ADMIN — KETOROLAC TROMETHAMINE 15 MG: 30 INJECTION, SOLUTION INTRAMUSCULAR; INTRAVENOUS at 10:40

## 2023-01-22 RX ADMIN — HYDROMORPHONE HYDROCHLORIDE 1 MG: 1 INJECTION, SOLUTION INTRAMUSCULAR; INTRAVENOUS; SUBCUTANEOUS at 12:23

## 2023-01-22 RX ADMIN — HYDROMORPHONE HYDROCHLORIDE 1 MG: 1 INJECTION, SOLUTION INTRAMUSCULAR; INTRAVENOUS; SUBCUTANEOUS at 11:09

## 2023-01-22 RX ADMIN — SODIUM CHLORIDE 1000 ML: 0.9 INJECTION, SOLUTION INTRAVENOUS at 10:39

## 2023-01-22 RX ADMIN — TAMSULOSIN HYDROCHLORIDE 0.4 MG: 0.4 CAPSULE ORAL at 12:22

## 2023-01-22 NOTE — H&P
[facility] 1990, 27 y o  female MRN: [id number]  Unit/Bed#: ED 14 Encounter: [id number]  Primary Care Provider: Ana Zelaya DO   Date and time admitted to hospital: 1/22/2023 10:10 AM    * Ureterolithiasis  Assessment & Plan  26-year-old female patient with history of nephrolithiasis who presents with right flank pain, CT abdomen and pelvis revealing  "Mildly obstructing 2 mm calculus in the right mid to distal ureter ",  UA suggestive of acute cystitis  · Admit to [facility]  · Continue ceftriaxone as initiated in the ED  · Pain management  · Urology consult, n p o  after midnight for potential procedure  · Strain urine  · Monitor CBC, vital signs            Acute cystitis with hematuria  Assessment & Plan  · With microscopic hematuria in setting of ureterolithiasis  · Continue ceftriaxone  · Follow-up urine culture results  · Monitor CBC, vital signs    Nephrolithiasis  Assessment & Plan  · Tiny left renal calculus  · Strain urine, Flomax, treatment as above    PTSD (post-traumatic stress disorder)  Assessment & Plan  Continue Lexapro      VTE Pharmacologic Prophylaxis: VTE Score: 2 Low Risk (Score 0-2) - Encourage Ambulation  Code Status: Level 1 - Full Code   Discussion with family: Updated  () at bedside  Anticipated Length of Stay: Patient will be admitted on an observation basis with an anticipated length of stay of less than 2 midnights secondary to IV Rx, potential urologic procedure   Total Time for Visit, including Counseling / Coordination of Care: 75 minutes Greater than 50% of this total time spent on direct patient counseling and coordination of care  Chief Complaint: right flank pain     History of Present Illness:  Michael Goldstein is a 27 y o  female with a PMH of nephrolithiasis who presents with right flank pain    The patient states that this started last night becoming progressively worse, today associated with nausea and an episode of vomiting  Unsure of any fevers at home  Has had prior kidney stones and noted for passing a kidney stone approximately 3 weeks ago  Denies significant dysuria or hematuria  Prior to this reports normal appetite  Denies diarrhea or constipation  Denies chest pain, shortness of breath or palpitations  Denies lower extremity edema  Remainder of review of systems is negative  Review of Systems:  Review of Systems   Constitutional: Negative for chills and fever  HENT: Negative for congestion  Eyes: Negative for visual disturbance  Respiratory: Negative for shortness of breath  Cardiovascular: Negative for chest pain, palpitations and leg swelling  Gastrointestinal: Positive for nausea and vomiting  Negative for abdominal pain and diarrhea  Endocrine: Negative for polydipsia and polyuria  Genitourinary: Positive for flank pain (right )  Negative for dysuria and hematuria  Musculoskeletal: Negative for gait problem  Skin: Negative for rash  Neurological: Negative for dizziness  Hematological: Negative for adenopathy  Psychiatric/Behavioral: Negative for confusion  Past Medical and Surgical History:   Past Medical History:   Diagnosis Date   • Kidney stone    • Nephrolithiasis 7/10/2018       Past Surgical History:   Procedure Laterality Date   • NC CYSTO/URETERO W/LITHOTRIPSY &INDWELL STENT INSRT Right 7/11/2018    Procedure: CYSTOSCOPY URETEROSCOPY; RETROGRADE PYELOGRAM AND INSERTION STENT URETERAL;  Surgeon: Ale Agarwal MD;  Location: [facility];  Service: Urology       Meds/Allergies:  Prior to Admission medications    Medication Sig Start Date End Date Taking?  Authorizing Provider   Ascorbic Acid (VITAMIN C) 100 MG tablet Vitamin C  Patient not taking: Reported on 1/22/2023    Historical Provider, MD   BD Insulin Syringe U/F 30G X 1/2" 0 5 ML MISC  8/9/22   Historical Provider, MD   Cholecalciferol (VITAMIN D) 50 MCG (2000 UT) CAPS Vitamin D  Patient not taking: Reported on 1/22/2023    Historical Provider, MD   Ciclopirox 1 % shampoo APPLY TO SCALP AS DIRECTED 2-3 TIMES A WEEK AS NEEDED  Patient not taking: No sig reported 2/4/19   Historical Provider, MD   ciprofloxacin-dexamethasone (CIPRODEX) otic suspension Administer 4 drops to the right ear 2 (two) times a day for 7 days  Patient not taking: Reported on 9/20/2022 1/12/22 1/19/22  Edwin Lozoya MD   cyclobenzaprine (FLEXERIL) 10 mg tablet Take 1 tablet (10 mg total) by mouth daily at bedtime  Patient not taking: Reported on 1/4/2023 9/20/22   Jeffry Alvarez DO   escitalopram (LEXAPRO) 10 mg tablet Take 1 tablet (10 mg total) by mouth daily For anxiety 12/28/22 3/28/23  Jeffry Alvarez DO   estradiol (ESTRACE) 2 MG tablet  8/29/22   Historical Provider, MD   estradiol (ESTRACE) 2 MG tablet Take 1 tablet by mouth three times a day as directed + 1/2 tablet vaginally nightly  Patient not taking: Reported on 9/20/2022 8/29/22   Historical Provider, MD   fluticasone (FLONASE) 50 mcg/act nasal spray 1 spray into each nostril daily  Patient not taking: Reported on 1/12/2022    Historical Provider, MD   Insulin Syringe-Needle U-100 29G X 1/2" 0 5 ML MISC Use as directed  Patient not taking: Reported on 9/20/2022 8/9/22   Historical Provider, MD   ketoconazole (NIZORAL) 2 % cream APPLY A THIN LAYER TO AFFECTED AREAS TWICE A DAY FOR 6 WEEKS  Patient not taking: Reported on 9/20/2022 6/28/22   Historical Provider, MD   Leuprolide Acetate 5 MG/ML KIT Inject 10iu subcutaneous daily as directed  Patient not taking: Reported on 9/20/2022 5/25/22   Historical Provider, MD   lidocaine-prilocaine (EMLA) cream Apply to injection site one hour prior to injection as needed  Patient not taking: Reported on 9/20/2022 5/25/22   Historical Provider, MD   multivitamin (THERAGRAN) TABS Take 1 tablet by mouth daily  Patient not taking: Reported on 1/22/2023    Historical Provider, MD   naproxen (EC NAPROSYN) 500 MG EC tablet Take 1 tablet (500 mg total) by mouth 2 (two) times a day with meals for 7 days 1/4/23 1/11/23  LIGIA Glasgow   norgestimate-ethinyl estradiol (ORTHO-CYCLEN) 0 25-35 MG-MCG per tablet Take 1 tablet by mouth daily  Patient not taking: Reported on 9/20/2022 5/25/22   Historical Provider, MD   phenazopyridine (PYRIDIUM) 200 mg tablet Take 1 tablet (200 mg total) by mouth every 8 (eight) hours as needed for bladder spasms  Patient not taking: Reported on 9/20/2022 6/2/21   Denise Moss DO   predniSONE 10 mg tablet 6 tablets daily, all at one time, with food  Then on day #4 decrease by 1 pill each day until finished  Patient not taking: Reported on 1/4/2023 9/20/22   Denise Moss DO   PRENATAL VIT-FE FUMARATE-FA PO Take by mouth daily  Patient not taking: Reported on 1/22/2023    Historical Provider, MD   Probiotic Product (PROBIOTIC-10 PO) Probiotic  Patient not taking: Reported on 1/22/2023    Historical Provider, MD   progesterone 50 mg/mL injection Inject 1ml IM daily as directed  Patient not taking: Reported on 9/20/2022 5/25/22   Historical Provider, MD   tamsulosin (FLOMAX) 0 4 mg Take 1 capsule (0 4 mg total) by mouth daily with dinner for 5 days 1/4/23 1/9/23  LIGIA Glasgow   tretinoin (RETIN-A) 0 025 % cream APPLY A PEA SIZE AMOUNT AS DIRECTED TO ACNE AREAS AT BEDTIME  Patient not taking: Reported on 9/20/2022 2/4/19   Historical Provider, MD     I have reviewed home medications with a medical source (PCP, Pharmacy, other)  Allergies:    Allergies   Allergen Reactions   • Bee Pollen Other (See Comments)   • Pollen Extract        Social History:  Marital Status: /Civil Union   Patient Pre-hospital Living Situation: Home  Patient Pre-hospital Level of Mobility: walks  Patient Pre-hospital Diet Restrictions: none   Substance Use History:   Social History     Substance and Sexual Activity   Alcohol Use No     Social History     Tobacco Use   Smoking Status Never   Smokeless Tobacco Never     Social History     Substance and Sexual Activity   Drug Use No       Family History:  Family History   Problem Relation Age of Onset   • Nephrolithiasis Father    • No Known Problems Mother        Physical Exam:     Vitals:   Blood Pressure: 110/73 (01/22/23 1600)  Pulse: 72 (01/22/23 1600)  Temperature: 98 5 °F (36 9 °C) (01/22/23 1400)  Temp Source: Oral (01/22/23 1400)  Respirations: 16 (01/22/23 1022)  Height: 5' 8" (172 7 cm) (01/22/23 1554)  Weight - Scale: 91 1 kg (200 lb 13 4 oz) (01/22/23 1554)  SpO2: 97 % (01/22/23 1600)    Physical Exam  Constitutional:       General: She is in acute distress (due to pain )  HENT:      Head: Normocephalic and atraumatic  Nose: No congestion  Mouth/Throat:      Pharynx: Oropharynx is clear  Eyes:      Conjunctiva/sclera: Conjunctivae normal    Cardiovascular:      Rate and Rhythm: Normal rate and regular rhythm  Heart sounds: No murmur heard  Pulmonary:      Effort: No respiratory distress  Breath sounds: No wheezing or rales  Abdominal:      Tenderness: There is right CVA tenderness  Musculoskeletal:      Right lower leg: No edema  Left lower leg: No edema  Skin:     General: Skin is warm and dry  Neurological:      Mental Status: She is oriented to person, place, and time     Psychiatric:         Mood and Affect: Mood normal           Additional Data:     Lab Results:  Results from last 7 days   Lab Units 01/22/23  1043   WBC Thousand/uL 11 18*   HEMOGLOBIN g/dL 12 4   HEMATOCRIT % 38 6   PLATELETS Thousands/uL 230   NEUTROS PCT % 88*   LYMPHS PCT % 10*   MONOS PCT % 2*   EOS PCT % 0     Results from last 7 days   Lab Units 01/22/23  1043   SODIUM mmol/L 138   POTASSIUM mmol/L 4 1   CHLORIDE mmol/L 104   CO2 mmol/L 26   BUN mg/dL 19   CREATININE mg/dL 0 94   ANION GAP mmol/L 8   CALCIUM mg/dL 9 3   GLUCOSE RANDOM mg/dL 119                       Lines/Drains:  Invasive Devices     Peripheral Intravenous Line  Duration Peripheral IV 01/22/23 Left Antecubital <1 day                    Imaging:   CT renal stone study abdomen pelvis without contrast   Final Result by Lisa Maharaj MD (01/22 1156)      Mildly obstructing 2 mm calculus in the right mid to distal ureter  Tiny left renal calculus  Workstation performed: [id number]             EKG and Other Studies Reviewed on Admission:   · EKG: No EKG obtained  ** Please Note: This note has been constructed using a voice recognition system   **

## 2023-01-22 NOTE — ASSESSMENT & PLAN NOTE
· With microscopic hematuria in setting of ureterolithiasis  · Continue ceftriaxone  · Follow-up urine culture results  · Monitor CBC, vital signs

## 2023-01-22 NOTE — ASSESSMENT & PLAN NOTE
57-year-old female patient with history of nephrolithiasis who presents with right flank pain, CT abdomen and pelvis revealing  "Mildly obstructing 2 mm calculus in the right mid to distal ureter ",  UA suggestive of acute cystitis  · Admit to MedSurg  · Continue ceftriaxone as initiated in the ED  · Pain management  · Urology consult, n p o  after midnight for potential procedure  · Strain urine  · Monitor CBC, vital signs

## 2023-01-22 NOTE — ED PROVIDER NOTES
History  Chief Complaint   Patient presents with   • Flank Pain     Pt reporting R sided flank pain that radiates to pts abdomen  Hx kidney stones and feels like this pain is similar  C/o nausea and hot flashes       32y  o female with PMH of kidney stones presents to the ER for right flank pain, nausea and vomiting since last night  Patient tried taking Percocet with relief  She describes her pain as sharp and radiating to her RLQ  Pain is constant  She denies fever, chills, URI symptoms, chest pain, dyspnea, diarrhea, urinary symptoms, weakness or paresthesias  She does have a history of kidney stones  She last followed with Urology 3 years ago  History provided by:  Patient   used: No        Prior to Admission Medications   Prescriptions Last Dose Informant Patient Reported? Taking?    Ascorbic Acid (VITAMIN C) 100 MG tablet Not Taking  Yes No   Sig: Vitamin C   Patient not taking: Reported on 1/22/2023   BD Insulin Syringe U/F 30G X 1/2" 0 5 ML MISC Not Taking  Yes No   Patient not taking: Reported on 9/20/2022   Cholecalciferol (VITAMIN D) 50 MCG (2000 UT) CAPS Not Taking  Yes No   Sig: Vitamin D   Patient not taking: Reported on 1/22/2023   Ciclopirox 1 % shampoo Not Taking  Yes No   Sig: APPLY TO SCALP AS DIRECTED 2-3 TIMES A WEEK AS NEEDED   Patient not taking: No sig reported   Insulin Syringe-Needle U-100 29G X 1/2" 0 5 ML MISC Not Taking  Yes No   Sig: Use as directed   Patient not taking: Reported on 9/20/2022   Leuprolide Acetate 5 MG/ML KIT Not Taking  Yes No   Sig: Inject 10iu subcutaneous daily as directed   Patient not taking: Reported on 9/20/2022   PRENATAL VIT-FE FUMARATE-FA PO Not Taking  Yes No   Sig: Take by mouth daily   Patient not taking: Reported on 1/22/2023   Probiotic Product (PROBIOTIC-10 PO) Not Taking  Yes No   Sig: Probiotic   Patient not taking: Reported on 1/22/2023   ciprofloxacin-dexamethasone (CIPRODEX) otic suspension   No No   Sig: Administer 4 drops to the right ear 2 (two) times a day for 7 days   Patient not taking: Reported on 2022   cyclobenzaprine (FLEXERIL) 10 mg tablet Not Taking  No No   Sig: Take 1 tablet (10 mg total) by mouth daily at bedtime   Patient not taking: Reported on 2023   escitalopram (LEXAPRO) 10 mg tablet   No No   Sig: Take 1 tablet (10 mg total) by mouth daily For anxiety   estradiol (ESTRACE) 2 MG tablet Not Taking  Yes No   Patient not taking: Reported on 2022   estradiol (ESTRACE) 2 MG tablet Not Taking  Yes No   Sig: Take 1 tablet by mouth three times a day as directed + 1/2 tablet vaginally nightly   Patient not taking: Reported on 2022   fluticasone (FLONASE) 50 mcg/act nasal spray Not Taking  Yes No   Si spray into each nostril daily   Patient not taking: Reported on 2022   ketoconazole (NIZORAL) 2 % cream Not Taking  Yes No   Sig: APPLY A THIN LAYER TO AFFECTED AREAS TWICE A DAY FOR 6 WEEKS   Patient not taking: Reported on 2022   lidocaine-prilocaine (EMLA) cream Not Taking  Yes No   Sig: Apply to injection site one hour prior to injection as needed   Patient not taking: Reported on 2022   multivitamin (THERAGRAN) TABS Not Taking  Yes No   Sig: Take 1 tablet by mouth daily   Patient not taking: Reported on 2023   naproxen (EC NAPROSYN) 500 MG EC tablet   No No   Sig: Take 1 tablet (500 mg total) by mouth 2 (two) times a day with meals for 7 days   norgestimate-ethinyl estradiol (ORTHO-CYCLEN) 0 25-35 MG-MCG per tablet Not Taking  Yes No   Sig: Take 1 tablet by mouth daily   Patient not taking: Reported on 2022   phenazopyridine (PYRIDIUM) 200 mg tablet Not Taking  No No   Sig: Take 1 tablet (200 mg total) by mouth every 8 (eight) hours as needed for bladder spasms   Patient not taking: Reported on 2022   predniSONE 10 mg tablet Not Taking  No No   Si tablets daily, all at one time, with food  Then on day #4 decrease by 1 pill each day until finished     Patient not taking: Reported on 1/4/2023   progesterone 50 mg/mL injection Not Taking  Yes No   Sig: Inject 1ml IM daily as directed   Patient not taking: Reported on 9/20/2022   tamsulosin (FLOMAX) 0 4 mg   No No   Sig: Take 1 capsule (0 4 mg total) by mouth daily with dinner for 5 days   tretinoin (RETIN-A) 0 025 % cream Not Taking  Yes No   Sig: APPLY A PEA SIZE AMOUNT AS DIRECTED TO ACNE AREAS AT BEDTIME   Patient not taking: Reported on 9/20/2022      Facility-Administered Medications: None       Past Medical History:   Diagnosis Date   • Kidney stone    • Nephrolithiasis 7/10/2018       Past Surgical History:   Procedure Laterality Date   • [place] CYSTO/URETERO W/LITHOTRIPSY &INDWELL STENT INSRT Right 7/11/2018    Procedure: CYSTOSCOPY URETEROSCOPY; RETROGRADE PYELOGRAM AND INSERTION STENT URETERAL;  Surgeon: Ale Agarwal MD;  Location: [facility] OR;  Service: Urology       Family History   Problem Relation Age of Onset   • Nephrolithiasis Father    • No Known Problems Mother      I have reviewed and agree with the history as documented  E-Cigarette/Vaping   • E-Cigarette Use Never User      E-Cigarette/Vaping Substances   • Nicotine No    • THC No    • CBD No    • Flavoring No    • Other No    • Unknown No      Social History     Tobacco Use   • Smoking status: Never   • Smokeless tobacco: Never   Vaping Use   • Vaping Use: Never used   Substance Use Topics   • Alcohol use: No   • Drug use: No       Review of Systems   Constitutional: Negative for activity change, appetite change, chills and fever  HENT: Negative for congestion, drooling, ear discharge, ear pain, facial swelling, rhinorrhea and sore throat  Eyes: Negative for redness  Respiratory: Negative for cough and shortness of breath  Cardiovascular: Negative for chest pain  Gastrointestinal: Positive for abdominal pain, nausea and vomiting  Negative for diarrhea  Genitourinary: Positive for flank pain   Negative for dysuria, frequency, hematuria and urgency  Musculoskeletal: Negative for neck stiffness  Skin: Negative for rash  Allergic/Immunologic: Negative for food allergies  Neurological: Negative for weakness and numbness  Physical Exam  Physical Exam  Vitals and nursing note reviewed  Constitutional:       General: She is not in acute distress  Appearance: She is not toxic-appearing  HENT:      Head: Normocephalic and atraumatic  Eyes:      Conjunctiva/sclera: Conjunctivae normal    Neck:      Trachea: No tracheal deviation  Cardiovascular:      Rate and Rhythm: Normal rate and regular rhythm  Heart sounds: Normal heart sounds, S1 normal and S2 normal  No murmur heard  No friction rub  No gallop  Pulmonary:      Effort: Pulmonary effort is normal  No respiratory distress  Breath sounds: Normal breath sounds  No decreased breath sounds, wheezing, rhonchi or rales  Chest:      Chest wall: No tenderness  Abdominal:      General: Bowel sounds are normal  There is no distension  Palpations: Abdomen is soft  Tenderness: There is no abdominal tenderness  There is right CVA tenderness  There is no left CVA tenderness, guarding or rebound  Musculoskeletal:      Cervical back: Normal range of motion and neck supple  Skin:     General: Skin is warm and dry  Findings: No rash  Neurological:      Mental Status: She is alert  GCS: GCS eye subscore is 4  GCS verbal subscore is 5  GCS motor subscore is 6     Psychiatric:         Mood and Affect: Mood normal          Vital Signs  ED Triage Vitals [01/22/23 0925]   Temperature Pulse Respirations Blood Pressure SpO2   97 7 °F (36 5 °C) 72 17 106/75 98 %      Temp Source Heart Rate Source Patient Position - Orthostatic VS BP Location FiO2 (%)   Oral Monitor Sitting Right arm --      Pain Score       8           Vitals:    01/22/23 1022 01/22/23 1115 01/22/23 1204 01/22/23 1400   BP: 109/84 110/67 116/72 118/70   Pulse: 73 66 64 70   Patient Position - Orthostatic VS:             Visual Acuity      ED Medications  Medications   escitalopram (LEXAPRO) tablet 10 mg (10 mg Oral Not Given 1/22/23 1400)   tamsulosin (FLOMAX) capsule 0 4 mg (has no administration in time range)   sodium chloride 0 9 % infusion (100 mL/hr Intravenous New Bag 1/22/23 1353)   acetaminophen (TYLENOL) tablet 650 mg (has no administration in time range)   ondansetron (ZOFRAN) injection 4 mg (4 mg Intravenous Given 1/22/23 1447)   ketorolac (TORADOL) injection 30 mg (has no administration in time range)   oxyCODONE (ROXICODONE) IR tablet 5 mg (5 mg Oral Given 1/22/23 1404)     Or   oxyCODONE (ROXICODONE) immediate release tablet 10 mg ( Oral See Alternative 1/22/23 1404)   HYDROmorphone (DILAUDID) injection 1 mg (has no administration in time range)   sodium chloride 0 9 % bolus 1,000 mL (0 mL Intravenous Stopped 1/22/23 1223)   ondansetron (ZOFRAN) injection 4 mg (4 mg Intravenous Given 1/22/23 1043)   ketorolac (TORADOL) injection 15 mg (15 mg Intravenous Given 1/22/23 1040)   HYDROmorphone (DILAUDID) injection 1 mg (1 mg Intravenous Given 1/22/23 1109)   HYDROmorphone (DILAUDID) injection 1 mg (1 mg Intravenous Given 1/22/23 1223)   tamsulosin (FLOMAX) capsule 0 4 mg (0 4 mg Oral Given 1/22/23 1222)   ceftriaxone (ROCEPHIN) 2 g/50 mL in dextrose IVPB (0 mg Intravenous Stopped 1/22/23 1446)       Diagnostic Studies  Results Reviewed     Procedure Component Value Units Date/Time    COVID/FLU/RSV [[id number]] Collected: 01/22/23 1452    Lab Status:  In process Specimen: Nares from Nose Updated: 01/22/23 4246    Basic metabolic panel [[id number]] Collected: 01/22/23 1043    Lab Status: Final result Specimen: Blood from Arm, Left Updated: 01/22/23 1135     Sodium 138 mmol/L      Potassium 4 1 mmol/L      Chloride 104 mmol/L      CO2 26 mmol/L      ANION GAP 8 mmol/L      BUN 19 mg/dL      Creatinine 0 94 mg/dL      Glucose 119 mg/dL      Calcium 9 3 mg/dL      eGFR 79 ml/min/1 73sq m     Narrative: National Kidney Disease Foundation guidelines for Chronic Kidney Disease (CKD):   •  Stage 1 with normal or high GFR (GFR > 90 mL/min/1 73 square meters)  •  Stage 2 Mild CKD (GFR = 60-89 mL/min/1 73 square meters)  •  Stage 3A Moderate CKD (GFR = 45-59 mL/min/1 73 square meters)  •  Stage 3B Moderate CKD (GFR = 30-44 mL/min/1 73 square meters)  •  Stage 4 Severe CKD (GFR = 15-29 mL/min/1 73 square meters)  •  Stage 5 End Stage CKD (GFR <15 mL/min/1 73 square meters)  Note: GFR calculation is accurate only with a steady state creatinine    Urine Microscopic [[id number]]  (Abnormal) Collected: 01/22/23 1032    Lab Status: Final result Specimen: Urine, Clean Catch Updated: 01/22/23 1110     RBC, UA Innumerable /hpf      WBC, UA 4-10 /hpf      Epithelial Cells Occasional /hpf      Bacteria, UA Innumerable /hpf      MUCUS THREADS Innumerable    CBC and differential [[id number]]  (Abnormal) Collected: 01/22/23 1043    Lab Status: Final result Specimen: Blood from Arm, Left Updated: 01/22/23 1050     WBC 11 18 Thousand/uL      RBC 4 31 Million/uL      Hemoglobin 12 4 g/dL      Hematocrit 38 6 %      MCV 90 fL      MCH 28 8 pg      MCHC 32 1 g/dL      RDW 13 4 %      MPV 10 8 fL      Platelets 769 Thousands/uL      nRBC 0 /100 WBCs      Neutrophils Relative 88 %      Immat GRANS % 0 %      Lymphocytes Relative 10 %      Monocytes Relative 2 %      Eosinophils Relative 0 %      Basophils Relative 0 %      Neutrophils Absolute 9 76 Thousands/µL      Immature Grans Absolute 0 04 Thousand/uL      Lymphocytes Absolute 1 08 Thousands/µL      Monocytes Absolute 0 27 Thousand/µL      Eosinophils Absolute 0 00 Thousand/µL      Basophils Absolute 0 03 Thousands/µL     POCT pregnancy, urine [[id number]]  (Normal) Resulted: 01/22/23 1035    Lab Status: Final result Updated: 01/22/23 1035     EXT Preg Test, Ur Negative     Control Valid    Urine Macroscopic, POC [[id number]]  (Abnormal) Collected: 01/22/23 1032    Lab Status: Final result Specimen: Urine Updated: 01/22/23 1034     Color, UA Yellow     Clarity, UA Clear     pH, UA 7 0     Leukocytes, UA Trace     Nitrite, UA Negative     Protein,  (2+) mg/dl      Glucose, UA Negative mg/dl      Ketones, UA 15 (1+) mg/dl      Urobilinogen, UA 1 0 E U /dl      Bilirubin, UA Negative     Occult Blood, UA Large     Specific Gravity, UA 1 025    Narrative:      CLINITEK RESULT                 CT renal stone study abdomen pelvis without contrast   Final Result by Tyshawn Tavarez MD (01/22 1156)      Mildly obstructing 2 mm calculus in the right mid to distal ureter  Tiny left renal calculus  Workstation performed: [id number]                    Procedures  Procedures         ED Course  ED Course as of 01/22/23 1510   Sun Jan 22, 2023   1034 Blood, UA(!): Large  Does have her period  1034 Leukocytes, UA(!): Trace   1034 Nitrite, UA: Negative   1035 PREGNANCY TEST URINE: Negative   1059 WBC(!): 11 18   1059 Hemoglobin: 12 4   1059 Platelet Count: 646   1120 Bacteria, UA(!): Innumerable   1120 RBC, UA(!): Innumerable   1120 WBC, UA(!): 4-10   1138 Creatinine: 0 94   1221 Informed patient of lab and imaging findings  Patient not getting much pain relief  Will speak with Urology for admission  [address] with Dr Tyler Ruiz from [place]  Will admit under observation  SBIRT 20yo+    Flowsheet Row Most Recent Value   SBIRT (25 yo +)    In order to provide better care to our patients, we are screening all of our patients for alcohol and drug use  Would it be okay to ask you these screening questions? No Filed at: 01/22/2023 1020                    Medical Decision Making  32y  o female presents to the ER for right flank pain, nausea and vomiting for 2 days  Vitals stable  Patient in no acute distress  On exam, lungs are clear  Heart is regular rate and rhythm  Abdomen is soft and non-tender  No distention, guarding or rigidity   Patient does have right sided CVAT  Will check labs and imaging  1034 Blood, UA(!): Large - Does have her period  1034 Leukocytes, UA(!): Trace    1034 Nitrite, UA: Negative    1035 PREGNANCY TEST URINE: Negative    1059 WBC(!): 11 18    1059 Hemoglobin: 12 4    1059 Platelet Count: 363    1120 Bacteria, UA(!): Innumerable - will treat    1120 RBC, UA(!): Innumerable    1120 WBC, UA(!): 4-10    1138 Creatinine: 0 94    1221 Informed patient of lab and imaging findings  Patient not getting much pain relief  Will speak with Urology for admission  [address] with Thierry Ware from Urology  She recommends SLIM admission with starting Flomax and fluids  She states she will not be back to the campus today but patient will be seen tomorrow by Urology  [address] with Dr Ale Abebe from [facility]  Will admit under observation  Patient agreeable to plan  1500    Patient signed out to Aquiles Hunt PA-C awaiting transfer to [facility]  Patient stable  Right ureteral stone: acute illness or injury  UTI (urinary tract infection): acute illness or injury  Amount and/or Complexity of Data Reviewed  Independent Historian:      Details: Patient is historian  Labs: ordered  Decision-making details documented in ED Course  Radiology: ordered  Risk  Prescription drug management  Decision regarding hospitalization            Disposition  Final diagnoses:   Right ureteral stone   UTI (urinary tract infection)     Time reflects when diagnosis was documented in both MDM as applicable and the Disposition within this note     Time User Action Codes Description Comment    1/22/2023  1:13 PM Nate Alexandra Add [N20 1] Right ureteral stone     1/22/2023  1:14 PM Ever HANSEN Add [N39 0] UTI (urinary tract infection)       ED Disposition     ED Disposition   Admit    Condition   Stable    Date/Time   Sun Jan 22, 2023  1:13 PM    Comment   Case was discussed with Dr Ale Abebe from [facility] and the patient's admission status was agreed to be Admission Status: observation status to the service of Dr Tasneem Carrillo   Follow-up Information    None         Patient's Medications   Discharge Prescriptions    No medications on file       No discharge procedures on file      PDMP Review     None          ED Provider  Electronically Signed by           Wiley Rolon PA-C  01/22/23 4785

## 2023-01-23 ENCOUNTER — TRANSITIONAL CARE MANAGEMENT (OUTPATIENT)
Dept: FAMILY MEDICINE CLINIC | Facility: CLINIC | Age: 33
End: 2023-01-23

## 2023-01-23 VITALS
WEIGHT: 200.84 LBS | BODY MASS INDEX: 30.44 KG/M2 | SYSTOLIC BLOOD PRESSURE: 111 MMHG | OXYGEN SATURATION: 98 % | HEIGHT: 68 IN | HEART RATE: 68 BPM | TEMPERATURE: 98.3 F | RESPIRATION RATE: 16 BRPM | DIASTOLIC BLOOD PRESSURE: 66 MMHG

## 2023-01-23 LAB
ALBUMIN SERPL BCP-MCNC: 3.7 G/DL (ref 3.5–5)
ALP SERPL-CCNC: 53 U/L (ref 34–104)
ALT SERPL W P-5'-P-CCNC: 28 U/L (ref 7–52)
ANION GAP SERPL CALCULATED.3IONS-SCNC: 6 MMOL/L (ref 4–13)
AST SERPL W P-5'-P-CCNC: 25 U/L (ref 13–39)
BASOPHILS # BLD AUTO: 0.02 THOUSANDS/ÂΜL (ref 0–0.1)
BASOPHILS NFR BLD AUTO: 0 % (ref 0–1)
BILIRUB SERPL-MCNC: 0.36 MG/DL (ref 0.2–1)
BUN SERPL-MCNC: 14 MG/DL (ref 5–25)
CALCIUM SERPL-MCNC: 8 MG/DL (ref 8.4–10.2)
CHLORIDE SERPL-SCNC: 105 MMOL/L (ref 96–108)
CO2 SERPL-SCNC: 26 MMOL/L (ref 21–32)
CREAT SERPL-MCNC: 0.74 MG/DL (ref 0.6–1.3)
EOSINOPHIL # BLD AUTO: 0.03 THOUSAND/ÂΜL (ref 0–0.61)
EOSINOPHIL NFR BLD AUTO: 0 % (ref 0–6)
ERYTHROCYTE [DISTWIDTH] IN BLOOD BY AUTOMATED COUNT: 13.6 % (ref 11.6–15.1)
GFR SERPL CREATININE-BSD FRML MDRD: 106 ML/MIN/1.73SQ M
GLUCOSE P FAST SERPL-MCNC: 89 MG/DL (ref 65–99)
GLUCOSE SERPL-MCNC: 89 MG/DL (ref 65–140)
HCT VFR BLD AUTO: 33.4 % (ref 34.8–46.1)
HGB BLD-MCNC: 10.4 G/DL (ref 11.5–15.4)
IMM GRANULOCYTES # BLD AUTO: 0.02 THOUSAND/UL (ref 0–0.2)
IMM GRANULOCYTES NFR BLD AUTO: 0 % (ref 0–2)
LYMPHOCYTES # BLD AUTO: 2.3 THOUSANDS/ÂΜL (ref 0.6–4.47)
LYMPHOCYTES NFR BLD AUTO: 31 % (ref 14–44)
MCH RBC QN AUTO: 28.7 PG (ref 26.8–34.3)
MCHC RBC AUTO-ENTMCNC: 31.1 G/DL (ref 31.4–37.4)
MCV RBC AUTO: 92 FL (ref 82–98)
MONOCYTES # BLD AUTO: 0.38 THOUSAND/ÂΜL (ref 0.17–1.22)
MONOCYTES NFR BLD AUTO: 5 % (ref 4–12)
NEUTROPHILS # BLD AUTO: 4.8 THOUSANDS/ÂΜL (ref 1.85–7.62)
NEUTS SEG NFR BLD AUTO: 64 % (ref 43–75)
NRBC BLD AUTO-RTO: 0 /100 WBCS
PLATELET # BLD AUTO: 182 THOUSANDS/UL (ref 149–390)
PMV BLD AUTO: 10.8 FL (ref 8.9–12.7)
POTASSIUM SERPL-SCNC: 3.6 MMOL/L (ref 3.5–5.3)
PROT SERPL-MCNC: 6.1 G/DL (ref 6.4–8.4)
RBC # BLD AUTO: 3.62 MILLION/UL (ref 3.81–5.12)
SODIUM SERPL-SCNC: 137 MMOL/L (ref 135–147)
WBC # BLD AUTO: 7.55 THOUSAND/UL (ref 4.31–10.16)

## 2023-01-23 RX ORDER — OXYCODONE HYDROCHLORIDE 5 MG/1
5 TABLET ORAL EVERY 6 HOURS PRN
Qty: 10 TABLET | Refills: 0 | Status: SHIPPED | OUTPATIENT
Start: 2023-01-23 | End: 2023-02-02

## 2023-01-23 RX ORDER — CEPHALEXIN 500 MG/1
500 CAPSULE ORAL EVERY 6 HOURS SCHEDULED
Qty: 8 CAPSULE | Refills: 0 | Status: SHIPPED | OUTPATIENT
Start: 2023-01-23 | End: 2023-01-25

## 2023-01-23 RX ADMIN — ESCITALOPRAM OXALATE 10 MG: 10 TABLET ORAL at 08:24

## 2023-01-23 NOTE — ASSESSMENT & PLAN NOTE
77-year-old female patient with history of nephrolithiasis who presents with right flank pain, CT abdomen and pelvis revealing  "Mildly obstructing 2 mm calculus in the right mid to distal ureter ",  UA suggestive of acute cystitis  · Patient had spontaneous passing of her kidney stone with no need for urologic intervention  · Continue antibiotics complete 3-day course with Keflex  · Outpatient urology follow-up  Continue to maintain good oral hydration

## 2023-01-23 NOTE — CONSULTS
Consult - Urology   Candida Reusing 1990, 27 y o  female MRN: [id number]    Unit/Bed#: E5 -01 Encounter: [id number]    Assessment & Plan  Recurrent nephrolithiasis- strict low oxalate low protein diet  She admits good water intake,  oz per day    Acute right ureteral calculus with hydronephrosis- spontaneous stone passage today with resolution of symptoms  No additional therapy needed  Followup in outpatient setting for annual surveillance    Urology will sign off but remain available for any further inpatient needs  Please feel free to contact the provider currently covering the Urology [facility] role for this campus with questions or concerns  Subjective: acute right flank and groin pain 2 days decreased urination  Feels exact same as prior multiple stone episodes (5 episodes in past 5 yrs, usually works through it at home with flomax/analgesia)  Passed her stone early this morning with a large volume void and has been pain free/symptom free since  She would like to go home  Teresa Zee was sent for analysis  Review of Systems   Constitutional: Negative for activity change, appetite change, chills, fever and unexpected weight change  HENT: Negative  Respiratory: Negative  Negative for shortness of breath  Cardiovascular: Negative  Negative for chest pain  Gastrointestinal: Negative for abdominal pain, diarrhea, nausea and vomiting  Endocrine: Negative  Genitourinary: Negative for decreased urine volume, difficulty urinating, dysuria, flank pain, frequency, hematuria and urgency  Musculoskeletal: Negative for back pain and gait problem  Skin: Negative  Allergic/Immunologic: Negative  Neurological: Negative  Hematological: Negative for adenopathy  Does not bruise/bleed easily  Objective:  Vitals: Blood pressure 111/66, pulse 68, temperature 98 3 °F (36 8 °C), temperature source Oral, resp   rate 16, height 5' 8" (1 727 m), weight 91 1 kg (200 lb 13 4 oz), last menstrual period 01/19/2023, SpO2 98 %  ,Body mass index is 30 54 kg/m²  Physical Exam  Vitals and nursing note reviewed  Constitutional:       General: She is not in acute distress  Appearance: She is well-developed  She is not diaphoretic  HENT:      Head: Normocephalic and atraumatic  Cardiovascular:      Rate and Rhythm: Normal rate and regular rhythm  Pulmonary:      Effort: Pulmonary effort is normal       Breath sounds: Normal breath sounds  Abdominal:      General: Bowel sounds are normal       Palpations: Abdomen is soft  Tenderness: There is no right CVA tenderness or left CVA tenderness  Skin:     General: Skin is warm  Capillary Refill: Capillary refill takes less than 2 seconds  Neurological:      Mental Status: She is alert and oriented to person, place, and time  Gait: Gait normal    Psychiatric:         Speech: Speech normal          Behavior: Behavior normal          Imaging:    CT renal stone study abdomen pelvis without contrast [[id number]] Collected: 01/22/23 1152   Order Status: Completed Updated: 01/22/23 1157   Narrative:     CT ABDOMEN AND PELVIS WITHOUT IV CONTRAST - LOW DOSE RENAL STONE     INDICATION:   Flank pain, kidney stone suspected   right flank pain  COMPARISON:  None       TECHNIQUE:  Low radiation dose thin section CT examination of the abdomen and pelvis was performed without intravenous or oral contrast according to a protocol specifically designed to evaluate for urinary tract calculus   Axial, sagittal, and coronal 2D    reformatted images were created from the source data and submitted for interpretation   Evaluation for pathology in the abdomen and pelvis that is unrelated to urinary tract calculi is limited        Radiation dose length product (DLP) for this visit:  418 mGy-cm    This examination, like all CT scans performed in the [facility], was performed utilizing techniques to minimize radiation dose exposure, including the use of iterative   reconstruction and automated exposure control  URINARY TRACT FINDINGS:     RIGHT KIDNEY AND URETER:  Mild hydroureteronephrosis secondary to a 2 mm calculus in the mid to distal ureter, at the level of the sacrum   There is mild perinephric stranding  LEFT KIDNEY AND URETER:  1 mm calculus in the upper pole the kidney   No hydronephrosis or hydroureter  URINARY BLADDER:  Unremarkable  ADDITIONAL FINDINGS:     LOWER CHEST:  No clinically significant abnormality identified in the visualized lower chest      SOLID VISCERA: Limited low radiation dose noncontrast CT evaluation demonstrates no clinically significant abnormality of the imaged portions of the liver, spleen, pancreas, or adrenal glands        GALLBLADDER/BILIARY TREE:  No calcified gallstones  No pericholecystic inflammatory change   No biliary dilatation  STOMACH AND BOWEL:  Unremarkable  APPENDIX:  No findings to suggest appendicitis  ABDOMINOPELVIC CAVITY:  No ascites   No pneumoperitoneum   No lymphadenopathy  REPRODUCTIVE ORGANS:  Unremarkable for patient's age  ABDOMINAL WALL/INGUINAL REGIONS:  Unremarkable  OSSEOUS STRUCTURES:  No acute fracture or destructive osseous lesion  Impression:       Mildly obstructing 2 mm calculus in the right mid to distal ureter  Tiny left renal calculus  Imaging reviewed - both report and images personally reviewed       Labs:  Recent Labs     01/22/23  1043 01/23/23  0449   WBC 11 18* 7 55     Recent Labs     01/22/23  1043 01/23/23  0449   HGB 12 4 10 4*       Recent Labs     01/22/23  1043 01/23/23  0449   CREATININE 0 94 0 74       Microbiology:  Susie Ward [facility]    History:  Social History     Socioeconomic History   • Marital status: /Civil Union     Spouse name: None   • Number of children: None   • Years of education: None   • Highest education level: None   Occupational History   • None   Tobacco Use   • Smoking status: Never • Smokeless tobacco: Never   Vaping Use   • Vaping Use: Never used   Substance and Sexual Activity   • Alcohol use: Yes     Comment: socially   • Drug use: No   • Sexual activity: None   Other Topics Concern   • None   Social History Narrative   • None     Social Determinants of Health     Financial Resource Strain: Not on file   Food Insecurity: Not on file   Transportation Needs: Not on file   Physical Activity: Not on file   Stress: Not on file   Social Connections: Not on file   Intimate Partner Violence: Not on file   Housing Stability: Not on file       Past Medical History:   Diagnosis Date   • Kidney stone    • Nephrolithiasis 7/10/2018     Past Surgical History:   Procedure Laterality Date   • [place] CYSTO/URETERO W/LITHOTRIPSY &INDWELL STENT INSRT Right 7/11/2018    Procedure: CYSTOSCOPY URETEROSCOPY; RETROGRADE PYELOGRAM AND INSERTION STENT URETERAL;  Surgeon: Sofia Escudero MD;  Location: [facility];  Service: Urology     Family History   Problem Relation Age of Onset   • Nephrolithiasis Father    • No Known Problems Mother        Satinder Jay  Date: 1/23/2023 Time: 10:31 AM

## 2023-01-23 NOTE — DISCHARGE SUMMARY
[address] Discharge- Winn Holstein 1990, 27 y o  female MRN: [id number]  Unit/Bed#: E5 -01 Encounter: [id number]  Primary Care Provider: Kaleb Smith DO   Date and time admitted to hospital: 1/22/2023 10:10 AM    * Ureterolithiasis  Assessment & Plan  78-year-old female patient with history of nephrolithiasis who presents with right flank pain, CT abdomen and pelvis revealing  "Mildly obstructing 2 mm calculus in the right mid to distal ureter ",  UA suggestive of acute cystitis  · Patient had spontaneous passing of her kidney stone with no need for urologic intervention  · Continue antibiotics complete 3-day course with Keflex  · Outpatient urology follow-up  Continue to maintain good oral hydration        Acute cystitis with hematuria  Assessment & Plan  · With microscopic hematuria in setting of ureterolithiasis  · Placed on rocephin, will transition to keflex to complete abx course      PTSD (post-traumatic stress disorder)  Assessment & Plan  Continue Lexapro    Nephrolithiasis  Assessment & Plan  · Tiny left renal calculus  · Patient passed kidney stone and was caught on straining urine  · See plan above      Medical Problems     Resolved Problems  Date Reviewed: 1/23/2023   None       Discharging Physician / Practitioner: Huma Pro PA-C  PCP: Kaleb Smith DO  Admission Date:   Admission Orders (From admission, onward)     Ordered        01/22/23 1314  Place in Observation  Once                      Discharge Date: 01/23/23    Consultations During Hospital Stay:  · Urology    Procedures Performed:   · None    Significant Findings / Test Results:   · CT abdomen pelvis:  URINARY TRACT FINDINGS:     RIGHT KIDNEY AND URETER:  Mild hydroureteronephrosis secondary to a 2 mm calculus in the mid to distal ureter, at the level of the sacrum  There is mild perinephric stranding      LEFT KIDNEY AND URETER:  1 mm calculus in the upper pole the kidney    No hydronephrosis or hydroureter      URINARY BLADDER:  Unremarkable         ADDITIONAL FINDINGS:     LOWER CHEST:  No clinically significant abnormality identified in the visualized lower chest      SOLID VISCERA: Limited low radiation dose noncontrast CT evaluation demonstrates no clinically significant abnormality of the imaged portions of the liver, spleen, pancreas, or adrenal glands        GALLBLADDER/BILIARY TREE:  No calcified gallstones  No pericholecystic inflammatory change  No biliary dilatation      STOMACH AND BOWEL:  Unremarkable      APPENDIX:  No findings to suggest appendicitis      ABDOMINOPELVIC CAVITY:  No ascites  No pneumoperitoneum  No lymphadenopathy      REPRODUCTIVE ORGANS:  Unremarkable for patient's age      ABDOMINAL WALL/INGUINAL REGIONS:  Unremarkable      OSSEOUS STRUCTURES:  No acute fracture or destructive osseous lesion      IMPRESSION:     Mildly obstructing 2 mm calculus in the right mid to distal ureter      Tiny left renal calculus  Incidental Findings:   · None      Test Results Pending at Discharge (will require follow up):   · none     Outpatient Tests Requested:  · Urology , PCP     Complications:      Reason for Admission: kidney stone, acute cystitis     Hospital Course:   Tati Valencia is a 27 y o  female patient who originally presented to the hospital on 1/22/2023 due to mildly obstructing 2 mm right kidney stone  Patient also noted to have acute cystitis and was placed empirically on ceftriaxone  Patient spontaneously passed her kidney stone without any urologic intervention  She was seen by urology during hospitalization and will follow-up outpatient as needed  She will continue Keflex to complete antibiotic course  Please see above list of diagnoses and related plan for additional information  Condition at Discharge: stable    Discharge Day Visit / Exam:   Subjective: Doing well  No pain  No fevers chills no chest pain or shortness of breath    No other acute complaints and is eager to go home  Vitals: Blood Pressure: 111/66 (01/23/23 0824)  Pulse: 68 (01/23/23 0824)  Temperature: 98 3 °F (36 8 °C) (01/23/23 0824)  Temp Source: Oral (01/23/23 0824)  Respirations: 16 (01/23/23 0824)  Height: 5' 8" (172 7 cm) (01/22/23 1554)  Weight - Scale: 91 1 kg (200 lb 13 4 oz) (01/22/23 1554)  SpO2: 98 % (01/23/23 0824)  Exam:   Physical Exam  Vitals and nursing note reviewed  Cardiovascular:      Rate and Rhythm: Normal rate and regular rhythm  Pulmonary:      Effort: Pulmonary effort is normal  No respiratory distress  Breath sounds: Normal breath sounds  No wheezing or rales  Abdominal:      General: Bowel sounds are normal  There is no distension  Palpations: Abdomen is soft  Tenderness: There is no abdominal tenderness  There is no guarding  Musculoskeletal:      Right lower leg: No edema  Left lower leg: No edema  Skin:     General: Skin is warm  Neurological:      Mental Status: She is alert  Mental status is at baseline  Psychiatric:         Mood and Affect: Mood normal           Discussion with Family: Updated  () at bedside  Discharge instructions/Information to patient and family:   See after visit summary for information provided to patient and family  Provisions for Follow-Up Care:  See after visit summary for information related to follow-up care and any pertinent home health orders  Disposition:   Home    Planned Readmission: none     Discharge Statement:  I spent 40 minutes discharging the patient  This time was spent on the day of discharge  I had direct contact with the patient on the day of discharge  Greater than 50% of the total time was spent examining patient, answering all patient questions, arranging and discussing plan of care with patient as well as directly providing post-discharge instructions  Additional time then spent on discharge activities      Discharge Medications:  See after visit summary for reconciled discharge medications provided to patient and/or family        **Please Note: This note may have been constructed using a voice recognition system**

## 2023-01-23 NOTE — UTILIZATION REVIEW
Initial Clinical Review    Admission: Date/Time/Statement:   Admission Orders (From admission, onward)     Ordered        01/22/23 1314  Place in Observation  Once                      Orders Placed This Encounter   Procedures   • Place in Observation     Standing Status:   Standing     Number of Occurrences:   1     Order Specific Question:   Level of Care     Answer:   Med Surg [16]     ED Arrival Information     Expected   -    Arrival   1/22/2023 09:22    Acuity   Urgent            Means of arrival   Walk-In    Escorted by   Self    Service   Hospitalist    Admission type   Emergency            Arrival complaint   flank pain, nausea           Chief Complaint   Patient presents with   • Flank Pain     Pt reporting R sided flank pain that radiates to pts abdomen  Hx kidney stones and feels like this pain is similar  C/o nausea and hot flashes  Initial Presentation: 27 y o  female presents to ED from home  With right flank pain since the  Evening prior to admission, becoming progressively worse,  Now with nausea and  1 episode of vomiting  Has a  H/O  Kidney stones and  Passed 1  About  3 weeks ago  Ct scan shows right ureteral calculus  U/A  Suggests  Acute cystitis  Admit  Observation with  Ureterolithiasis, Acute cystitis with hematuria, Nephrolithiasis and plan is  Urology consult, monitor labs, strain urine, urine culture and pain control           ED Triage Vitals [01/22/23 0925]   Temperature Pulse Respirations Blood Pressure SpO2   97 7 °F (36 5 °C) 72 17 106/75 98 %      Temp Source Heart Rate Source Patient Position - Orthostatic VS BP Location FiO2 (%)   Oral Monitor Sitting Right arm --      Pain Score       8          Wt Readings from Last 1 Encounters:   01/22/23 91 1 kg (200 lb 13 4 oz)     Additional Vital Signs:    97 9 °F (36 6 °C) 73 -- 102/59 73 94 % -- --   01/22/23 23:21:14 97 9 °F (36 6 °C) -- -- 99/65 76 -- -- --   01/22/23 18:16:39 97 8 °F (36 6 °C) 76 16 110/77 88 97 % -- -- 01/22/23 1803 -- 74 16 107/69 -- 97 % None (Room air) Lying   01/22/23 1600 -- 72 -- 110/73 88 97 % -- --   01/22/23 1412 -- -- -- -- -- 96 % None (Room air) --   01/22/23 1411 -- -- -- -- -- 95 % -- --   01/22/23 1400 98 5 °F (36 9 °C) 70 -- 118/70 88 94 % -- --   01/22/23 1204 -- 64 -- 116/72 88 95 % -- --   01/22/23 1115 -- 66 -- 110/67 83 96 % None (Room air) --   01/22/23 1022 -- 73 16 109/84 -- 95 % None (Room air) --   01/22/23 0925 97 7 °F (36 5 °C) 72 17 106/75 -- 98 % None (Room air) Sitting           Pertinent Labs/Diagnostic Test Results:   CT renal stone study abdomen pelvis without contrast   Final Result by Zahida Cedeño MD (01/22 1156)      Mildly obstructing 2 mm calculus in the right mid to distal ureter  Tiny left renal calculus           Workstation performed: [id number]           Results from last 7 days   Lab Units 01/22/23  1541   SARS-COV-2  Negative     Results from last 7 days   Lab Units 01/23/23  0449 01/22/23  1043   WBC Thousand/uL 7 55 11 18*   HEMOGLOBIN g/dL 10 4* 12 4   HEMATOCRIT % 33 4* 38 6   PLATELETS Thousands/uL 182 230   NEUTROS ABS Thousands/µL 4 80 9 76*         Results from last 7 days   Lab Units 01/23/23  0449 01/22/23  1043   SODIUM mmol/L 137 138   POTASSIUM mmol/L 3 6 4 1   CHLORIDE mmol/L 105 104   CO2 mmol/L 26 26   ANION GAP mmol/L 6 8   BUN mg/dL 14 19   CREATININE mg/dL 0 74 0 94   EGFR ml/min/1 73sq m 106 79   CALCIUM mg/dL 8 0* 9 3     Results from last 7 days   Lab Units 01/23/23  0449   AST U/L 25   ALT U/L 28   ALK PHOS U/L 53   TOTAL PROTEIN g/dL 6 1*   ALBUMIN g/dL 3 7   TOTAL BILIRUBIN mg/dL 0 36         Results from last 7 days   Lab Units 01/23/23  0449 01/22/23  1043   GLUCOSE RANDOM mg/dL 89 119               Results from last 7 days   Lab Units 01/22/23  1032   CLARITY UA  Clear   COLOR UA  Yellow   SPEC GRAV UA  1 025   PH UA  7 0   GLUCOSE UA mg/dl Negative   KETONES UA mg/dl 15 (1+)*   BLOOD UA  Large*   PROTEIN UA mg/dl 100 (2+)* NITRITE UA  Negative   BILIRUBIN UA  Negative   UROBILINOGEN UA E U /dl 1 0   LEUKOCYTES UA  Trace*   WBC UA /hpf 4-10*   RBC UA /hpf Innumerable*   BACTERIA UA /hpf Innumerable*   EPITHELIAL CELLS WET PREP /hpf Occasional   MUCUS THREADS  Innumerable*     Results from last 7 days   Lab Units 01/22/23  1541   INFLUENZA A PCR  Negative   INFLUENZA B PCR  Negative   RSV PCR  Negative             ED Treatment:   Medication Administration from 01/22/2023 0921 to 01/22/2023 1813       Date/Time Order Dose Route Action Comments     01/22/2023 1223 EST sodium chloride 0 9 % bolus 1,000 mL 0 mL Intravenous Stopped --     01/22/2023 1039 EST sodium chloride 0 9 % bolus 1,000 mL 1,000 mL Intravenous New Bag --     01/22/2023 1043 EST ondansetron (ZOFRAN) injection 4 mg 4 mg Intravenous Given --     01/22/2023 1040 EST ketorolac (TORADOL) injection 15 mg 15 mg Intravenous Given --     01/22/2023 1109 EST HYDROmorphone (DILAUDID) injection 1 mg 1 mg Intravenous Given --     01/22/2023 1223 EST HYDROmorphone (DILAUDID) injection 1 mg 1 mg Intravenous Given --     01/22/2023 1222 EST tamsulosin (FLOMAX) capsule 0 4 mg 0 4 mg Oral Given --     01/22/2023 1446 EST ceftriaxone (ROCEPHIN) 2 g/50 mL in dextrose IVPB 0 mg Intravenous Stopped --     01/22/2023 1351 EST ceftriaxone (ROCEPHIN) 2 g/50 mL in dextrose IVPB 2,000 mg Intravenous New Bag --     01/22/2023 1400 EST escitalopram (LEXAPRO) tablet 10 mg 10 mg Oral Not Given --     01/22/2023 1736 EST tamsulosin (FLOMAX) capsule 0 4 mg 0 4 mg Oral Not Given Pt already received dose this am, d/w dr Lana Guy to begin tomrrow     01/22/2023 1353 EST sodium chloride 0 9 % infusion 100 mL/hr Intravenous New Bag --     01/22/2023 1447 EST ondansetron (ZOFRAN) injection 4 mg 4 mg Intravenous Given --     01/22/2023 1404 EST oxyCODONE (ROXICODONE) IR tablet 5 mg 5 mg Oral Given --     01/22/2023 1404 EST oxyCODONE (ROXICODONE) immediate release tablet 10 mg -- Oral See Alternative -- 01/22/2023 1558 EST ketorolac (TORADOL) injection 30 mg 30 mg Intravenous Given --        Present on Admission:  • PTSD (post-traumatic stress disorder)      Admitting Diagnosis: UTI (urinary tract infection) [N39 0]  Flank pain [R10 9]  Right ureteral stone [N20 1]  Age/Sex: 27 y o  female  Admission Orders:  Scheduled Medications:  [START ON 1/24/2023] cefTRIAXone, 1,000 mg, Intravenous, Q24H  escitalopram, 10 mg, Oral, Daily  tamsulosin, 0 4 mg, Oral, Daily With Dinner      Continuous IV Infusions:     PRN Meds:  acetaminophen, 650 mg, Oral, Q6H PRN  HYDROmorphone, 1 mg, Intravenous, Q3H PRN  ondansetron, 4 mg, Intravenous, Q6H PRN  oxyCODONE, 5 mg, Oral, Q4H PRN   Or  oxyCODONE, 10 mg, Oral, Q4H PRN        IP CONSULT TO UROLOGY    Network Utilization Review Department  ATTENTION: Please call with any questions or concerns to [phone] and carefully listen to the prompts so that you are directed to the right person  All voicemails are confidential   Donal Small all requests for admission clinical reviews, approved or denied determinations and any other requests to dedicated fax number below belonging to the campus where the patient is receiving treatment   List of dedicated fax numbers for the Facilities:  [address] DENIALS (Administrative/Medical Necessity) [phone]   [address] (Maternity/NICU/Pediatrics) [phone]   [address] Kylee Funez [phone]   [facility] [phone]   [facility] [phone]   [facility] [address] [facility] [phone] [address] Bobo Beyer [phone]

## 2023-01-23 NOTE — ASSESSMENT & PLAN NOTE
· With microscopic hematuria in setting of ureterolithiasis  · Placed on rocephin, will transition to keflex to complete abx course

## 2023-01-23 NOTE — NURSING NOTE
Discharge instructions reviewed and questions answered  Discharged to home with outpatient follow-up

## 2023-01-23 NOTE — NURSING NOTE
Agree with previous RN assessment  Pt resting in bed, c/o severe pain  Pain meds given, belonging and call bell within reach  Will continue to monitor

## 2023-01-23 NOTE — PLAN OF CARE
Problem: PAIN - ADULT  Goal: Verbalizes/displays adequate comfort level or baseline comfort level  Description: Interventions:  - Encourage patient to monitor pain and request assistance  - Assess pain using appropriate pain scale  - Administer analgesics based on type and severity of pain and evaluate response  - Implement non-pharmacological measures as appropriate and evaluate response  - Consider cultural and social influences on pain and pain management  - Notify physician/advanced practitioner if interventions unsuccessful or patient reports new pain  Outcome: Progressing     Problem: INFECTION - ADULT  Goal: Absence or prevention of progression during hospitalization  Description: INTERVENTIONS:  - Assess and monitor for signs and symptoms of infection  - Monitor lab/diagnostic results  - Monitor all insertion sites, i e  indwelling lines, tubes, and drains  - Monitor endotracheal if appropriate and nasal secretions for changes in amount and color  - [place] appropriate cooling/warming therapies per order  - Administer medications as ordered  - Instruct and encourage patient and family to use good hand hygiene technique  - Identify and instruct in appropriate isolation precautions for identified infection/condition  Outcome: Progressing     Problem: DISCHARGE PLANNING  Goal: Discharge to home or other facility with appropriate resources  Description: INTERVENTIONS:  - Identify barriers to discharge w/patient and caregiver  - Arrange for needed discharge resources and transportation as appropriate  - Identify discharge learning needs (meds, wound care, etc )  - Arrange for interpretive services to assist at discharge as needed  - Refer to Case Management Department for coordinating discharge planning if the patient needs post-hospital services based on physician/advanced practitioner order or complex needs related to functional status, cognitive ability, or social support system  Outcome: Progressing Problem: Knowledge Deficit  Goal: Patient/family/caregiver demonstrates understanding of disease process, treatment plan, medications, and discharge instructions  Description: Complete learning assessment and assess knowledge base    Interventions:  - Provide teaching at level of understanding  - Provide teaching via preferred learning methods  Outcome: Progressing     Problem: GENITOURINARY - ADULT  Goal: Maintains or returns to baseline urinary function  Description: INTERVENTIONS:  - Assess urinary function  - Encourage oral fluids to ensure adequate hydration if ordered  - Administer IV fluids as ordered to ensure adequate hydration  - Administer ordered medications as needed  - Offer frequent toileting  - Follow urinary retention protocol if ordered  Outcome: Progressing

## 2023-01-24 ENCOUNTER — DOCUMENTATION (OUTPATIENT)
Dept: NEPHROLOGY | Facility: CLINIC | Age: 33
End: 2023-01-24

## 2023-01-24 LAB — BACTERIA UR CULT: NORMAL

## 2023-01-28 LAB
CALCIUM OXALATE DIHYDRATE MFR STONE IR: 50 %
COLOR STONE: NORMAL
COM MFR STONE: 30 %
COMMENT-STONE3: NORMAL
COMPOSITION: NORMAL
HYDROXYAPATITE 24H ENGDIFF UR: 20 %
LABORATORY COMMENT REPORT: NORMAL
PHOTO: NORMAL
SIZE STONE: NORMAL MM
SPEC SOURCE SUBJ: NORMAL
STONE ANALYSIS-IMP: NORMAL
STONE ANALYSIS-IMP: NORMAL
WT STONE: 28 MG

## 2023-02-16 ENCOUNTER — TELEPHONE (OUTPATIENT)
Dept: NEPHROLOGY | Facility: CLINIC | Age: 33
End: 2023-02-16

## 2023-02-16 NOTE — TELEPHONE ENCOUNTER
Called patient to schedule consult - patient declines scheduling   She states she can handle the kidney stones on her own at this time and will call us if anything changes

## 2023-02-22 NOTE — ED NOTES
Dr Briana Barnett made aware of patient requesting additional pain medication at this time  Patient states she last took motrin at 0600, percocet at 0630        Abigail Zavala, RN  07/10/18 7502
Patient is resting in bed at this time  States medication for pain has helped but pain is returning  5/10 at this time  Vital signs stable        Christ Jeans, RN  07/10/18 8861
Patient provided with juice, crackers at this time        Abigail Zavala, RN  07/10/18 3726
Patient tolerating PO intake well at this time  Ambulated independently  Steady gait noted  Patient is reporting increased pain  Dr Chin Paige made aware        Douglas Monsivais RN  07/10/18 5398
(1) partially meets; needs review/practice

## 2023-03-23 PROBLEM — N30.01 ACUTE CYSTITIS WITH HEMATURIA: Status: RESOLVED | Noted: 2023-01-22 | Resolved: 2023-03-23

## 2023-09-06 NOTE — TELEPHONE ENCOUNTER
Called and left message for Patient to call clinic and schedule a follow-up appointment.    Patient called and stated that she never got a call back in regards to her 24 hour urine results or what the next step would be   Patient's call back number is [phone]

## 2023-09-18 DIAGNOSIS — F43.10 PTSD (POST-TRAUMATIC STRESS DISORDER): ICD-10-CM

## 2023-09-18 RX ORDER — ESCITALOPRAM OXALATE 10 MG/1
TABLET ORAL
Qty: 90 TABLET | Refills: 3 | Status: SHIPPED | OUTPATIENT
Start: 2023-09-18

## 2024-03-13 LAB
EXTERNAL HEPATITIS B SURFACE ANTIGEN: NON REACTIVE
EXTERNAL RUBELLA IGG QUANTITATION: NORMAL

## 2024-03-18 LAB — EXTERNAL SYPHILIS TOTAL IGG/IGM SCREENING: NON REACTIVE

## 2024-03-22 ENCOUNTER — HOSPITAL ENCOUNTER (EMERGENCY)
Facility: HOSPITAL | Age: 34
Discharge: HOME | End: 2024-03-22
Attending: EMERGENCY MEDICINE
Payer: COMMERCIAL

## 2024-03-22 VITALS
OXYGEN SATURATION: 99 % | RESPIRATION RATE: 18 BRPM | HEART RATE: 78 BPM | BODY MASS INDEX: 31.32 KG/M2 | TEMPERATURE: 97.8 F | SYSTOLIC BLOOD PRESSURE: 140 MMHG | WEIGHT: 209 LBS | DIASTOLIC BLOOD PRESSURE: 77 MMHG

## 2024-03-22 DIAGNOSIS — O46.90 VAGINAL BLEEDING DURING PREGNANCY: Primary | ICD-10-CM

## 2024-03-22 LAB
ABO GROUP BLD: NORMAL
ANION GAP SERPL CALCULATED.3IONS-SCNC: 7 MMOL/L (ref 4–13)
B-HCG SERPL-ACNC: ABNORMAL MIU/ML (ref 0–5)
BASOPHILS # BLD AUTO: 0.04 THOUSANDS/ÂΜL (ref 0–0.1)
BASOPHILS NFR BLD AUTO: 0 % (ref 0–1)
BLD GP AB SCN SERPL QL: NEGATIVE
BUN SERPL-MCNC: 11 MG/DL (ref 5–25)
CALCIUM SERPL-MCNC: 9.6 MG/DL (ref 8.4–10.2)
CHLORIDE SERPL-SCNC: 102 MMOL/L (ref 96–108)
CO2 SERPL-SCNC: 27 MMOL/L (ref 21–32)
CREAT SERPL-MCNC: 0.58 MG/DL (ref 0.6–1.3)
EOSINOPHIL # BLD AUTO: 0.07 THOUSAND/ÂΜL (ref 0–0.61)
EOSINOPHIL NFR BLD AUTO: 1 % (ref 0–6)
ERYTHROCYTE [DISTWIDTH] IN BLOOD BY AUTOMATED COUNT: 14.1 % (ref 11.6–15.1)
GFR SERPL CREATININE-BSD FRML MDRD: 119 ML/MIN/1.73SQ M
GLUCOSE SERPL-MCNC: 96 MG/DL (ref 65–140)
HCT VFR BLD AUTO: 38 % (ref 34.8–46.1)
HGB BLD-MCNC: 12.6 G/DL (ref 11.5–15.4)
IMM GRANULOCYTES # BLD AUTO: 0.04 THOUSAND/UL (ref 0–0.2)
IMM GRANULOCYTES NFR BLD AUTO: 0 % (ref 0–2)
LYMPHOCYTES # BLD AUTO: 2.19 THOUSANDS/ÂΜL (ref 0.6–4.47)
LYMPHOCYTES NFR BLD AUTO: 21 % (ref 14–44)
MCH RBC QN AUTO: 29.6 PG (ref 26.8–34.3)
MCHC RBC AUTO-ENTMCNC: 33.2 G/DL (ref 31.4–37.4)
MCV RBC AUTO: 89 FL (ref 82–98)
MONOCYTES # BLD AUTO: 0.58 THOUSAND/ÂΜL (ref 0.17–1.22)
MONOCYTES NFR BLD AUTO: 6 % (ref 4–12)
NEUTROPHILS # BLD AUTO: 7.49 THOUSANDS/ÂΜL (ref 1.85–7.62)
NEUTS SEG NFR BLD AUTO: 72 % (ref 43–75)
NRBC BLD AUTO-RTO: 0 /100 WBCS
PLATELET # BLD AUTO: 233 THOUSANDS/UL (ref 149–390)
PMV BLD AUTO: 10.5 FL (ref 8.9–12.7)
POTASSIUM SERPL-SCNC: 3.9 MMOL/L (ref 3.5–5.3)
RBC # BLD AUTO: 4.26 MILLION/UL (ref 3.81–5.12)
RH BLD: POSITIVE
SODIUM SERPL-SCNC: 136 MMOL/L (ref 135–147)
SPECIMEN EXPIRATION DATE: NORMAL
WBC # BLD AUTO: 10.41 THOUSAND/UL (ref 4.31–10.16)

## 2024-03-22 PROCEDURE — 99284 EMERGENCY DEPT VISIT MOD MDM: CPT

## 2024-03-22 PROCEDURE — 36415 COLL VENOUS BLD VENIPUNCTURE: CPT | Performed by: PHYSICIAN ASSISTANT

## 2024-03-22 PROCEDURE — 85025 COMPLETE CBC W/AUTO DIFF WBC: CPT | Performed by: PHYSICIAN ASSISTANT

## 2024-03-22 PROCEDURE — 80048 BASIC METABOLIC PNL TOTAL CA: CPT | Performed by: PHYSICIAN ASSISTANT

## 2024-03-22 PROCEDURE — 84702 CHORIONIC GONADOTROPIN TEST: CPT | Performed by: PHYSICIAN ASSISTANT

## 2024-03-22 PROCEDURE — 86900 BLOOD TYPING SEROLOGIC ABO: CPT | Performed by: PHYSICIAN ASSISTANT

## 2024-03-22 PROCEDURE — 99284 EMERGENCY DEPT VISIT MOD MDM: CPT | Performed by: PHYSICIAN ASSISTANT

## 2024-03-22 PROCEDURE — 76815 OB US LIMITED FETUS(S): CPT | Performed by: PHYSICIAN ASSISTANT

## 2024-03-22 PROCEDURE — 86901 BLOOD TYPING SEROLOGIC RH(D): CPT | Performed by: PHYSICIAN ASSISTANT

## 2024-03-22 PROCEDURE — 86850 RBC ANTIBODY SCREEN: CPT | Performed by: PHYSICIAN ASSISTANT

## 2024-03-23 NOTE — ED PROVIDER NOTES
"History  Chief Complaint   Patient presents with    Vaginal Bleeding - Pregnant     Patient states she started with lower abdominal discomfort and vaginal bleeding today. Patient is 10weeks 5 days pregnant. Patient has hx of sub chorionic hematomas.      This is a 33-year-old female  presenting at 10w5d gestation for evaluation of vaginal bleeding.  Patient states that she conceived on IVF.  She has had confirmed IUP, last US was early March.  Patient states at the time she was informed that she had to subchorionic hematomas.  Patient states that she has previously had similar symptoms where she has dark brown to dark red spotting.  She denies any heavy bleeding.  She endorses some lower abdominal discomfort.  She is not actively bleeding at this time.  She states she had same symptoms approximately 1 week ago, discussed with her OB/GYN who informed her that it is likely the EKATERINA.      History provided by:  Patient   used: No        Prior to Admission Medications   Prescriptions Last Dose Informant Patient Reported? Taking?   BD Insulin Syringe U/F 30G X 1/2\" 0.5 ML MISC  Self Yes No   Patient not taking: Reported on 2022   Insulin Syringe-Needle U-100 29G X 1/2\" 0.5 ML MISC  Self Yes No   Sig: Use as directed   Patient not taking: Reported on 2022   escitalopram (LEXAPRO) 10 mg tablet   No No   Sig: TAKE 1 TABLET DAILY FOR ANXIETY      Facility-Administered Medications: None       Past Medical History:   Diagnosis Date    Allergic     Anxiety     Kidney stone     Nephrolithiasis 07/10/2018       Past Surgical History:   Procedure Laterality Date     SECTION  4/3/2015    AK CYSTO/URETERO W/LITHOTRIPSY &INDWELL STENT INSRT Right 2018    Procedure: CYSTOSCOPY URETEROSCOPY; RETROGRADE PYELOGRAM AND INSERTION STENT URETERAL;  Surgeon: Gerry Guillen MD;  Location: [facility] OR;  Service: Urology    US GUIDED THYROID BIOPSY  2021       Family History   Problem " Relation Age of Onset    Nephrolithiasis Father     No Known Problems Mother     Prostate cancer Maternal Grandfather     Diabetes Maternal Grandmother     Diabetes Paternal Grandfather     Diabetes Paternal Grandmother      I have reviewed and agree with the history as documented.    E-Cigarette/Vaping    E-Cigarette Use Never User      E-Cigarette/Vaping Substances    Nicotine No     THC No     CBD No     Flavoring No     Other No     Unknown No      Social History     Tobacco Use    Smoking status: Never    Smokeless tobacco: Never   Vaping Use    Vaping status: Never Used   Substance Use Topics    Alcohol use: Yes     Comment: socially    Drug use: No       Review of Systems   Constitutional:  Negative for chills and fever.   Gastrointestinal:  Positive for abdominal pain. Negative for nausea and vomiting.   Genitourinary:  Positive for vaginal bleeding.   Musculoskeletal:  Negative for back pain.   All other systems reviewed and are negative.      Physical Exam  Physical Exam  Vitals reviewed.   Constitutional:       General: She is not in acute distress.     Appearance: Normal appearance. She is well-developed and well-groomed. She is not ill-appearing.   HENT:      Head: Normocephalic and atraumatic.      Right Ear: External ear normal.      Left Ear: External ear normal.      Nose: Nose normal.   Eyes:      General: No scleral icterus.     Conjunctiva/sclera: Conjunctivae normal.   Cardiovascular:      Rate and Rhythm: Normal rate and regular rhythm.   Pulmonary:      Effort: Pulmonary effort is normal.      Breath sounds: No stridor.   Abdominal:      General: Abdomen is flat. There is no distension.      Palpations: Abdomen is soft.      Tenderness: There is no abdominal tenderness. There is no right CVA tenderness, left CVA tenderness, guarding or rebound.   Musculoskeletal:         General: No deformity. Normal range of motion.      Cervical back: Normal range of motion.   Skin:     Coloration: Skin is  not jaundiced or pale.      Findings: No lesion or rash.   Neurological:      Mental Status: She is alert and oriented to person, place, and time.   Psychiatric:         Mood and Affect: Mood normal.         Behavior: Behavior normal. Behavior is cooperative.         Vital Signs  ED Triage Vitals [03/22/24 2001]   Temperature Pulse Respirations Blood Pressure SpO2   97.8 °F (36.6 °C) 84 18 146/81 97 %      Temp Source Heart Rate Source Patient Position - Orthostatic VS BP Location FiO2 (%)   Oral Monitor Sitting Right arm --      Pain Score       --           Vitals:    03/22/24 2001 03/22/24 2030 03/22/24 2158   BP: 146/81 116/75 140/77   Pulse: 84 82 78   Patient Position - Orthostatic VS: Sitting           Visual Acuity      ED Medications  Medications - No data to display    Diagnostic Studies  Results Reviewed       Procedure Component Value Units Date/Time    Quantitative hCG [[id number]]  (Abnormal) Collected: 03/22/24 2042    Lab Status: Final result Specimen: Blood from Arm, Right Updated: 03/22/24 2200     HCG, Quant 158,751 mIU/mL     Narrative:       Expected Ranges:    HCG results between 5 and 25 mIU/mL may be indicative of early pregnancy but should be interpreted in light of the total clinical presentation.    HCG can rise to detectable levels in jax and post menopausal women (0-11.6 mIU/mL).     Approximate               Approximate HCG  Gestation age          Concentration ( mIU/mL)  _____________          ______________________   Weeks                      HCG values  0.2-1                       5-50  1-2                           2-3                         100-5000  3-4                         500-84352  4-5                         1000-48333  5-6                         [id number]  6-8                         [id number]  8-12                        [id number]      Basic metabolic panel [[id number]]  (Abnormal) Collected: 03/22/24 2042    Lab Status: Final result Specimen: Blood from  Arm, Right Updated: 03/22/24 2127     Sodium 136 mmol/L      Potassium 3.9 mmol/L      Chloride 102 mmol/L      CO2 27 mmol/L      ANION GAP 7 mmol/L      BUN 11 mg/dL      Creatinine 0.58 mg/dL      Glucose 96 mg/dL      Calcium 9.6 mg/dL      eGFR 119 ml/min/1.73sq m     Narrative:      National Kidney Disease Foundation guidelines for Chronic Kidney Disease (CKD):     Stage 1 with normal or high GFR (GFR > 90 mL/min/1.73 square meters)    Stage 2 Mild CKD (GFR = 60-89 mL/min/1.73 square meters)    Stage 3A Moderate CKD (GFR = 45-59 mL/min/1.73 square meters)    Stage 3B Moderate CKD (GFR = 30-44 mL/min/1.73 square meters)    Stage 4 Severe CKD (GFR = 15-29 mL/min/1.73 square meters)    Stage 5 End Stage CKD (GFR <15 mL/min/1.73 square meters)  Note: GFR calculation is accurate only with a steady state creatinine    CBC and differential [[id number]]  (Abnormal) Collected: 03/22/24 2042    Lab Status: Final result Specimen: Blood from Arm, Right Updated: 03/22/24 2052     WBC 10.41 Thousand/uL      RBC 4.26 Million/uL      Hemoglobin 12.6 g/dL      Hematocrit 38.0 %      MCV 89 fL      MCH 29.6 pg      MCHC 33.2 g/dL      RDW 14.1 %      MPV 10.5 fL      Platelets 233 Thousands/uL      nRBC 0 /100 WBCs      Neutrophils Relative 72 %      Immature Grans % 0 %      Lymphocytes Relative 21 %      Monocytes Relative 6 %      Eosinophils Relative 1 %      Basophils Relative 0 %      Neutrophils Absolute 7.49 Thousands/µL      Absolute Immature Grans 0.04 Thousand/uL      Absolute Lymphocytes 2.19 Thousands/µL      Absolute Monocytes 0.58 Thousand/µL      Eosinophils Absolute 0.07 Thousand/µL      Basophils Absolute 0.04 Thousands/µL                    No orders to display              Procedures  POC Pelvic US    Date/Time: 3/22/2024 8:26 PM    Performed by: Barbi Yancey PA-C  Authorized by: Barbi Yancey PA-C    Patient location:  ED  Other Assisting Provider: No    Procedure details:     Exam Type:  Diagnostic     Indications: evaluate for IUP, pregnant with abdominal pain and pregnant with vaginal bleeding      Assessment for: evaluate fetal viability      Technique:  Transabdominal obstetric (HCG+) exam    Image quality: diagnostic      Image availability:  Still images obtained  Uterine findings:     Intrauterine pregnancy: identified      Single gestation: identified      Fetal heart rate: identified      Fetal heart rate (bpm):  171  Interpretation:     Ultrasound impressions: normal      Pregnancy findings: intrauterine pregnancy (IUP)             ED Course  ED Course as of 24 2312   Fri Mar 22, 2024   2106 WBC(!): 10.41   2153 Rh Factor: Positive                     Medical Decision Making      DDx including but not limited to: threatened , missed , incomplete , anemia, coagulopathy; doubt ovarian torsion, ruptured ovarian cyst, ectopic pregnancy.    Patient presenting to ED for evaluation of vaginal bleeding.  Patient is 10w5d gestation.  She is having some lower abdominal discomfort as well.  US done bedside, positive IUP w/ FHT. Will order CBC for eval of anemia, BMP for eval of kidney function and electrolyte abnormalities.  HCG for trending quant. T&S for eval of Rh status.    Labs unremarkable. Recommend pt f/u with OB/GYN in regards to vaginal bleeding.  Strict return precautions discussed with patient bedside, verbalized understanding signs symptoms that warrant return to the ED.    Prior to discharge, discharge instructions were discussed with patient at bedside. Patient was provided both verbal and written instructions. Patient is understanding of the discharge instructions and is agreeable to plan of care. Return precautions were discussed with patient bedside, patient verbalized understanding of signs and symptoms that would necessitate return to the ED. All questions were answered. Patient was comfortable with the plan of care and discharged to home.     Dispo: discharge home  "with follow up to OB/GYN. Patient stable, in no acute distress and non-toxic at discharge.    Problems Addressed:  Vaginal bleeding during pregnancy: acute illness or injury    Amount and/or Complexity of Data Reviewed  Labs: ordered. Decision-making details documented in ED Course.             Disposition  Final diagnoses:   Vaginal bleeding during pregnancy     Time reflects when diagnosis was documented in both MDM as applicable and the Disposition within this note       Time User Action Codes Description Comment    3/22/2024  9:37 PM Barbi Yancey Add [O46.90] Vaginal bleeding during pregnancy           ED Disposition       ED Disposition   Discharge    Condition   Stable    Date/Time   Fri Mar 22, 2024  9:37 PM    Comment   Lorraine Klein discharge to home/self care.                   Follow-up Information       Follow up With Specialties Details Why Contact Info    Gaurav Anderson, DO Family Medicine Schedule an appointment as soon as possible for a visit  As needed [id number]  [facility] [address]  [phone]              Discharge Medication List as of 3/22/2024  9:55 PM        CONTINUE these medications which have NOT CHANGED    Details   BD Insulin Syringe U/F 30G X 1/2\" 0.5 ML MISC Starting Tue 8/9/2022, Historical Med      escitalopram (LEXAPRO) 10 mg tablet TAKE 1 TABLET DAILY FOR ANXIETY, Normal      Insulin Syringe-Needle U-100 29G X 1/2\" 0.5 ML MISC Use as directed, Historical Med             No discharge procedures on file.    PDMP Review       None            ED Provider  Electronically Signed by SALLY Cassidy PA-C  03/22/24 2715    "

## 2024-04-05 ENCOUNTER — OFFICE VISIT (OUTPATIENT)
Dept: FAMILY MEDICINE CLINIC | Facility: CLINIC | Age: 34
End: 2024-04-05
Payer: COMMERCIAL

## 2024-04-05 VITALS
TEMPERATURE: 98.4 F | OXYGEN SATURATION: 100 % | SYSTOLIC BLOOD PRESSURE: 123 MMHG | HEIGHT: 69 IN | HEART RATE: 98 BPM | DIASTOLIC BLOOD PRESSURE: 83 MMHG | WEIGHT: 209 LBS | BODY MASS INDEX: 30.96 KG/M2

## 2024-04-05 DIAGNOSIS — J01.00 ACUTE NON-RECURRENT MAXILLARY SINUSITIS: Primary | ICD-10-CM

## 2024-04-05 DIAGNOSIS — Z3A.12 PREGNANCY WITH 12 COMPLETED WEEKS GESTATION: ICD-10-CM

## 2024-04-05 DIAGNOSIS — Z11.4 SCREENING FOR HIV (HUMAN IMMUNODEFICIENCY VIRUS): ICD-10-CM

## 2024-04-05 DIAGNOSIS — Z11.59 NEED FOR HEPATITIS C SCREENING TEST: ICD-10-CM

## 2024-04-05 PROCEDURE — 99213 OFFICE O/P EST LOW 20 MIN: CPT | Performed by: FAMILY MEDICINE

## 2024-04-05 RX ORDER — AMOXICILLIN 500 MG/1
500 TABLET, FILM COATED ORAL 3 TIMES DAILY
Qty: 21 TABLET | Refills: 0 | Status: SHIPPED | OUTPATIENT
Start: 2024-04-05 | End: 2024-04-12

## 2024-04-05 RX ORDER — PREDNISONE 5 MG/1
TABLET ORAL
COMMUNITY
Start: 2024-02-08

## 2024-04-05 RX ORDER — PROGESTERONE 50 MG/ML
INJECTION, SOLUTION INTRAMUSCULAR
COMMUNITY
Start: 2024-02-22

## 2024-04-05 RX ORDER — ESTRADIOL 2 MG/1
TABLET ORAL
COMMUNITY
Start: 2024-01-24

## 2024-04-05 NOTE — PROGRESS NOTES
"Assessment/Plan: Patient is afebrile.  She may wish to use Claritin or Zyrtec for congestion and Flonase if needed.  Start antibiotic if symptoms persist or worsen.  Follow-up with OB/GYN for routine prenatal care as scheduled.     1. Acute non-recurrent maxillary sinusitis  -     amoxicillin (AMOXIL) 500 MG tablet; Take 1 tablet (500 mg total) by mouth 3 (three) times a day for 7 days    2. Need for hepatitis C screening test    3. Screening for HIV (human immunodeficiency virus)    4. Pregnancy with 12 completed weeks gestation          Subjective:      Patient ID: Lorraine Klein is a 33 y.o. female.    Patient with sinus pressure congestion and cough over the last 1 week.  She is 12 weeks pregnant.  Pregnancy is going well.  She recently had ultrasound.  Denies any fevers.    Earache     Sinus Problem  Associated symptoms include ear pain.            The following portions of the patient's history were reviewed and updated as appropriate: allergies, current medications, past family history, past medical history, past social history, past surgical history, and problem list.    Review of Systems   Constitutional: Negative.    HENT:  Positive for ear pain.    Eyes: Negative.    Respiratory: Negative.     Cardiovascular: Negative.    Gastrointestinal: Negative.    Endocrine: Negative.    Genitourinary: Negative.    Musculoskeletal: Negative.    Skin: Negative.    Allergic/Immunologic: Negative.    Neurological: Negative.    Hematological: Negative.    Psychiatric/Behavioral: Negative.           Objective:      /83 (BP Location: Left arm, Patient Position: Sitting, Cuff Size: Standard)   Pulse 98   Temp 98.4 °F (36.9 °C) (Tympanic)   Ht 5' 9\" (1.753 m)   Wt 94.8 kg (209 lb)   LMP 01/08/2024 (Within Weeks)   SpO2 100%   BMI 30.86 kg/m²          Physical Exam  Vitals reviewed.   Constitutional:       Appearance: She is well-developed.   HENT:      Head: Normocephalic and atraumatic.      Right Ear: External " ear normal. Tympanic membrane is not erythematous or bulging.      Left Ear: External ear normal. Tympanic membrane is not erythematous or bulging.      Nose: Nose normal.      Mouth/Throat:      Mouth: No oral lesions.      Pharynx: No oropharyngeal exudate.   Eyes:      General: No scleral icterus.        Right eye: No discharge.         Left eye: No discharge.      Conjunctiva/sclera: Conjunctivae normal.   Neck:      Thyroid: No thyromegaly.   Cardiovascular:      Rate and Rhythm: Normal rate and regular rhythm.      Heart sounds: Normal heart sounds. No murmur heard.     No friction rub. No gallop.   Pulmonary:      Effort: Pulmonary effort is normal. No respiratory distress.      Breath sounds: No wheezing or rales.   Chest:      Chest wall: No tenderness.   Abdominal:      General: Bowel sounds are normal. There is no distension.      Palpations: Abdomen is soft. There is no mass.      Tenderness: There is no abdominal tenderness. There is no guarding or rebound.   Musculoskeletal:         General: No tenderness or deformity. Normal range of motion.      Cervical back: Normal range of motion and neck supple.   Lymphadenopathy:      Cervical: No cervical adenopathy.   Skin:     General: Skin is warm and dry.      Coloration: Skin is not pale.      Findings: No erythema or rash.   Neurological:      Mental Status: She is alert and oriented to person, place, and time.      Cranial Nerves: No cranial nerve deficit.      Motor: No abnormal muscle tone.      Coordination: Coordination normal.      Deep Tendon Reflexes: Reflexes are normal and symmetric.   Psychiatric:         Behavior: Behavior normal.

## 2024-05-01 ENCOUNTER — TELEPHONE (OUTPATIENT)
Age: 34
End: 2024-05-01

## 2024-05-01 NOTE — TELEPHONE ENCOUNTER
Patient called and goes to [facility] and her pinky is 10/13/2024 and she is trying to schedule a level 2 us and she said they were going to fax over the referral.  Patient is trying to go to [place] and she can be reached at [phone] . Thank you

## 2024-05-08 ENCOUNTER — OFFICE VISIT (OUTPATIENT)
Dept: ENDOCRINOLOGY | Facility: CLINIC | Age: 34
End: 2024-05-08
Payer: COMMERCIAL

## 2024-05-08 VITALS
BODY MASS INDEX: 31.34 KG/M2 | OXYGEN SATURATION: 99 % | WEIGHT: 211.6 LBS | SYSTOLIC BLOOD PRESSURE: 124 MMHG | HEART RATE: 110 BPM | HEIGHT: 69 IN | DIASTOLIC BLOOD PRESSURE: 84 MMHG

## 2024-05-08 DIAGNOSIS — E04.1 THYROID NODULE: ICD-10-CM

## 2024-05-08 DIAGNOSIS — Z3A.17 17 WEEKS GESTATION OF PREGNANCY: ICD-10-CM

## 2024-05-08 DIAGNOSIS — R79.89 LOW TSH LEVEL: Primary | ICD-10-CM

## 2024-05-08 PROCEDURE — 99244 OFF/OP CNSLTJ NEW/EST MOD 40: CPT | Performed by: INTERNAL MEDICINE

## 2024-05-08 NOTE — PROGRESS NOTES
Lorraine Klein 33 y.o. female MRN: [id number]    Encounter: [id number]      Assessment/Plan     Problem List Items Addressed This Visit          Endocrine    Thyroid nodule     S/p fine-needle aspiration biopsy-right-sided thyroid nodule in -thyroid nodule has remained stable on serial imaging-continue to monitor            Obstetrics/Gynecology    17 weeks gestation of pregnancy       Other    Low TSH level - Primary     Labs reviewed with the patient and her family member-TSH is mildly low and was normal prior to pregnancy-this is likely due to normal physiological changes of pregnancy.  Will monitor and repeat TSH and free T4 in the next month.  As long as TSH remains stable or improves labs can be monitored every trimester.  If TSH is suppressed consider checking a TSI         Relevant Orders    T4, free    TSH, 3rd generation      CC:   Low TSH   History of Present Illness     HPI:  33-year-old female evaluation of low TSH -she is currently 17 weeks pregnant and Pregnancy is progressing well  No vomiting , some nausea , gained a few lbs     Occasional palpitations   Sleeping better     She has history of thyroid nodules and underwent fine-needle aspiration biopsy of the right-sided thyroid nodule in  which was negative for malignancy-thyroid nodule has remained stable on serial imaging  This is her second pregnancy  - 2nd IVF   Required IVF due to male factor         Review of Systems    Historical Information   Past Medical History:   Diagnosis Date    Allergic     Anxiety     Kidney stone     Nephrolithiasis 07/10/2018     Past Surgical History:   Procedure Laterality Date     SECTION  4/3/2015    NM CYSTO/URETERO W/LITHOTRIPSY &INDWELL STENT INSRT Right 2018    Procedure: CYSTOSCOPY URETEROSCOPY; RETROGRADE PYELOGRAM AND INSERTION STENT URETERAL;  Surgeon: Gerry Guillen MD;  Location: [facility] OR;  Service: Urology    US GUIDED THYROID BIOPSY  2021     Social History  "  Social History     Substance and Sexual Activity   Alcohol Use Yes    Comment: socially     Social History     Substance and Sexual Activity   Drug Use No     Social History     Tobacco Use   Smoking Status Never   Smokeless Tobacco Never     Family History:   Family History   Problem Relation Age of Onset    No Known Problems Mother     Nephrolithiasis Father     Nephrolithiasis Sister     Nephrolithiasis Paternal Aunt     Nephrolithiasis Paternal Uncle     Diabetes Maternal Grandmother     Prostate cancer Maternal Grandfather     Diabetes Paternal Grandmother     Thyroid disease unspecified Paternal Grandfather     Diabetes Paternal Grandfather      MGF - thyroid disorder       Meds/Allergies   Current Outpatient Medications   Medication Sig Dispense Refill    escitalopram (LEXAPRO) 10 mg tablet TAKE 1 TABLET DAILY FOR ANXIETY 90 tablet 3     No current facility-administered medications for this visit.     Allergies   Allergen Reactions    Bee Pollen Other (See Comments)    Pollen Extract        Objective   Vitals: Blood pressure 124/84, pulse (!) 110, height 5' 9\" (1.753 m), weight 96 kg (211 lb 9.6 oz), last menstrual period 01/08/2024, SpO2 99%.    Physical Exam  Vitals reviewed.   Constitutional:       General: She is not in acute distress.     Appearance: Normal appearance. She is obese. She is not ill-appearing, toxic-appearing or diaphoretic.   HENT:      Head: Normocephalic and atraumatic.   Eyes:      General: No scleral icterus.     Extraocular Movements: Extraocular movements intact.   Neck:      Comments: About 2 cm right-sided thyroid nodule palpated  Cardiovascular:      Rate and Rhythm: Normal rate and regular rhythm.      Heart sounds: Normal heart sounds. No murmur heard.  Pulmonary:      Effort: Pulmonary effort is normal. No respiratory distress.      Breath sounds: Normal breath sounds. No wheezing or rales.   Musculoskeletal:      Cervical back: Neck supple.      Right lower leg: No edema. "      Left lower leg: No edema.   Lymphadenopathy:      Cervical: No cervical adenopathy.   Skin:     General: Skin is warm and dry.   Neurological:      General: No focal deficit present.      Mental Status: She is alert and oriented to person, place, and time.      Gait: Gait normal.   Psychiatric:         Mood and Affect: Mood normal.         Behavior: Behavior normal.         Thought Content: Thought content normal.         Judgment: Judgment normal.         The history was obtained from the review of the chart, patient and family.    Lab Results:            Component  Ref Range & Units 3/13/24 10:03 AM 23 11:03 AM 23 10:45 AM   TSH  0.45 - 5.33 uIU/mL 0.35 Low  0.88 0.69          Lab Results   Component Value Date/Time    FREE T4 0.88 2024 10:03 AM     3 yr ago    Non-Gynecological Cytology [facility]  [address] [place] [id number] [phone]    PATHOLOGY REPORT            MR#:             [id number]  Patient:         ROLAND HINES  /Sex:         1990  F  Provider:        ADITI BARBER  Location:        US ULTRASOUND RADIOLOGY DIAG (CC)  Collect Date:    2021  [id number]    CYTOLOGIC DIAGNOSIS:  A  Thyroid Fine Needle Aspiration with [employer] Assistance, Right  Nodule  Adequacy: Adequate for evaluation.  General Category: II. Benign  Descriptive Interpretation: Findings are suggestive of nodular  hyperplasia. Follicular cells, colloid and hemosiderin laden  macrophages are present.        Electronically Signed Out By Armik Gutierrez MD           Imaging Studies:   Results for orders placed during the hospital encounter of 21    US thyroid    Impression  The following meet current ACR criteria for recommending ultrasound guided biopsy:      The 2.6 cm right lower pole nodule. (Image number 14) (CRITERIA: TR 3, Mildly suspicious. FNA if >2.5 cm.    This nodule correlates to the area of palpable concern and is hypervascular.    Reference: ACR  Thyroid Imaging, Reporting and Data System (TI-RADS): White Paper of the ACR TI-RADS Committee. J AM Renetta Radiol 2017;14:587-595. (additional recommendations based on American Thyroid Association 2015 guidelines.)      Workstation performed: [id number]      IMPRESSION: Homogeneous thyroid with bilateral thyroid nodules as described  above.    Sonographic pattern and FNA recommendations are based on American Thyroid  Association (DIANE) guidelines for assessment of thyroid nodules, as agreed upon  by multidisciplinary departments at [facility].    Sonographic pattern  Benign pattern (0% risk): no biopsy  Very low suspicion pattern (<3% risk): biopsy if > or = 2 cm (or ultrasound  observation)  Low suspicion pattern (5-10% risk): biopsy if > or = 1.5 cm  Intermediate suspicion pattern (10-20% risk): biopsy if > or = 1 cm  High suspicion pattern (>70-90% risk): biopsy if > or = 1 cm (for nodules <1 cm,  biopsy could be considered if technically feasible)          Workstation:[id number]  Narrative    This result has an attachment that is not available.  History: Multiple thyroid nodules    Ultrasound of the thyroid gland was performed with color-flow imaging    Comparison Studies: 11/14/2022    Findings:    Right thyroid lobe: 4.5 x 2.2 x 1.9 cm  Left thyroid lobe: 4.6 x 1.4 x 1.6 cm  Thyroid gland echotexture: homogeneous  Thyroid gland vascularity on color Doppler: normal    Nodules Right Lobe:    2.8 x 1.8 x 2.0 cm (previous 2.7 x 1.8 x 2.2 cm) isoechoic solid circumscribed  heterogeneous vascular nodule in the mid to lower pole, previously biopsied and  stable    Nodules Left Lobe:    0.3 x 0.1 x 0.3 cm (previous 0.4 x 0.2 x 0.3 cm) hypoechoic circumscribed  nonvascular nodule in the lower pole, stable    1.0 x 0.6 x 0.5 cm hypoechoic complex circumscribed nonvascular nodule in the  lower pole, new, with low sonographic suspicion pattern        Isthmus measures 0.2 cm  Exam End: 12/14/23  9:46 AM    Specimen Collected:  "12/14/23  5:09 PM Last Resulted: 12/14/23 11:44 PM   Received From: [facility]  Result Received: 12/15/23 10:03 AM         I have personally reviewed pertinent reports.      Portions of the record may have been created with voice recognition software. Occasional wrong word or \"sound a like\" substitutions may have occurred due to the inherent limitations of voice recognition software. Read the chart carefully and recognize, using context, where substitutions have occurred.    "

## 2024-05-08 NOTE — LETTER
May 8, 2024     Gaurav Anderson DO  [address]  [facility] [address]    Patient: Lorraine Klein   YOB: 1990   Date of Visit: 5/8/2024       Dear Dr. Anderson:    Thank you for referring Lorraine Klein to me for evaluation. Below are my notes for this consultation.    If you have questions, please do not hesitate to call me. I look forward to following your patient along with you.         Sincerely,        Tatyana Bartlett MD        CC: No Recipients    Tatyana Bartlett MD  5/8/2024  8:15 PM  Sign when Signing Visit   Lorraine Klein 33 y.o. female MRN: [id number]    Encounter: [id number]      Assessment/Plan    Problem List Items Addressed This Visit          Endocrine    Thyroid nodule     S/p fine-needle aspiration biopsy-right-sided thyroid nodule in 2021-thyroid nodule has remained stable on serial imaging-continue to monitor            Obstetrics/Gynecology    17 weeks gestation of pregnancy       Other    Low TSH level - Primary     Labs reviewed with the patient and her family member-TSH is mildly low and was normal prior to pregnancy-this is likely due to normal physiological changes of pregnancy.  Will monitor and repeat TSH and free T4 in the next month.  As long as TSH remains stable or improves labs can be monitored every trimester.  If TSH is suppressed consider checking a TSI         Relevant Orders    T4, free    TSH, 3rd generation      CC:   Low TSH   History of Present Illness    HPI:  33-year-old female evaluation of low TSH -she is currently 17 weeks pregnant and Pregnancy is progressing well  No vomiting , some nausea , gained a few lbs     Occasional palpitations   Sleeping better     She has history of thyroid nodules and underwent fine-needle aspiration biopsy of the right-sided thyroid nodule in 2021 which was negative for malignancy-thyroid nodule has remained stable on serial imaging  This is her second pregnancy  - 2nd IVF   Required IVF due to male factor         Review of  "Systems    Historical Information  Past Medical History:   Diagnosis Date   • Allergic    • Anxiety    • Kidney stone    • Nephrolithiasis 07/10/2018     Past Surgical History:   Procedure Laterality Date   •  SECTION  4/3/2015   • DC CYSTO/URETERO W/LITHOTRIPSY &INDWELL STENT INSRT Right 2018    Procedure: CYSTOSCOPY URETEROSCOPY; RETROGRADE PYELOGRAM AND INSERTION STENT URETERAL;  Surgeon: Gerry Guillen MD;  Location: [facility];  Service: Urology   • US GUIDED THYROID BIOPSY  2021     Social History  Social History     Substance and Sexual Activity   Alcohol Use Yes    Comment: socially     Social History     Substance and Sexual Activity   Drug Use No     Social History     Tobacco Use   Smoking Status Never   Smokeless Tobacco Never     Family History:   Family History   Problem Relation Age of Onset   • No Known Problems Mother    • Nephrolithiasis Father    • Nephrolithiasis Sister    • Nephrolithiasis Paternal Aunt    • Nephrolithiasis Paternal Uncle    • Diabetes Maternal Grandmother    • Prostate cancer Maternal Grandfather    • Diabetes Paternal Grandmother    • Thyroid disease unspecified Paternal Grandfather    • Diabetes Paternal Grandfather      MGF - thyroid disorder       Meds/Allergies  Current Outpatient Medications   Medication Sig Dispense Refill   • escitalopram (LEXAPRO) 10 mg tablet TAKE 1 TABLET DAILY FOR ANXIETY 90 tablet 3     No current facility-administered medications for this visit.     Allergies   Allergen Reactions   • Bee Pollen Other (See Comments)   • Pollen Extract        Objective  Vitals: Blood pressure 124/84, pulse (!) 110, height 5' 9\" (1.753 m), weight 96 kg (211 lb 9.6 oz), last menstrual period 2024, SpO2 99%.    Physical Exam  Vitals reviewed.   Constitutional:       General: She is not in acute distress.     Appearance: Normal appearance. She is obese. She is not ill-appearing, toxic-appearing or diaphoretic.   HENT:      Head: " Normocephalic and atraumatic.   Eyes:      General: No scleral icterus.     Extraocular Movements: Extraocular movements intact.   Neck:      Comments: About 2 cm right-sided thyroid nodule palpated  Cardiovascular:      Rate and Rhythm: Normal rate and regular rhythm.      Heart sounds: Normal heart sounds. No murmur heard.  Pulmonary:      Effort: Pulmonary effort is normal. No respiratory distress.      Breath sounds: Normal breath sounds. No wheezing or rales.   Musculoskeletal:      Cervical back: Neck supple.      Right lower leg: No edema.      Left lower leg: No edema.   Lymphadenopathy:      Cervical: No cervical adenopathy.   Skin:     General: Skin is warm and dry.   Neurological:      General: No focal deficit present.      Mental Status: She is alert and oriented to person, place, and time.      Gait: Gait normal.   Psychiatric:         Mood and Affect: Mood normal.         Behavior: Behavior normal.         Thought Content: Thought content normal.         Judgment: Judgment normal.         The history was obtained from the review of the chart, patient and family.    Lab Results:            Component  Ref Range & Units 3/13/24 10:03 AM 23 11:03 AM 23 10:45 AM   TSH  0.45 - 5.33 uIU/mL 0.35 Low  0.88 0.69          Lab Results   Component Value Date/Time    FREE T4 0.88 2024 10:03 AM     3 yr ago    Non-Gynecological Cytology [facility]  [address] [place] [id number] [phone]    PATHOLOGY REPORT            MR#:             [id number]  Patient:         ROLAND HINES  /Sex:         1990  F  Provider:        ADITI BARBER  Location:        [facility] ([facility])  Collect Date:    2021  [id number]    CYTOLOGIC DIAGNOSIS:  A  Thyroid Fine Needle Aspiration with Cytotech Assistance, Right  Nodule  Adequacy: Adequate for evaluation.  General Category: II. Benign  Descriptive Interpretation: Findings are suggestive of nodular  hyperplasia.  Follicular cells, colloid and hemosiderin laden  macrophages are present.        Electronically Signed Out By Amrik Gutierrez MD           Imaging Studies:   Results for orders placed during the hospital encounter of 03/18/21    US thyroid    Impression  The following meet current ACR criteria for recommending ultrasound guided biopsy:      The 2.6 cm right lower pole nodule. (Image number 14) (CRITERIA: TR 3, Mildly suspicious. FNA if >2.5 cm.    This nodule correlates to the area of palpable concern and is hypervascular.    Reference: ACR Thyroid Imaging, Reporting and Data System (TI-RADS): White Paper of the ACR TI-RADS Committee. J AM Renetta Radiol 2017;14:587-595. (additional recommendations based on American Thyroid Association 2015 guidelines.)      Workstation performed: [id number]      IMPRESSION: Homogeneous thyroid with bilateral thyroid nodules as described  above.    Sonographic pattern and FNA recommendations are based on American Thyroid  Association (DIANE) guidelines for assessment of thyroid nodules, as agreed upon  by multidisciplinary departments at [facility].    Sonographic pattern  Benign pattern (0% risk): no biopsy  Very low suspicion pattern (<3% risk): biopsy if > or = 2 cm (or ultrasound  observation)  Low suspicion pattern (5-10% risk): biopsy if > or = 1.5 cm  Intermediate suspicion pattern (10-20% risk): biopsy if > or = 1 cm  High suspicion pattern (>70-90% risk): biopsy if > or = 1 cm (for nodules <1 cm,  biopsy could be considered if technically feasible)          Workstation:[id number]  Narrative    This result has an attachment that is not available.  History: Multiple thyroid nodules    Ultrasound of the thyroid gland was performed with color-flow imaging    Comparison Studies: 11/14/2022    Findings:    Right thyroid lobe: 4.5 x 2.2 x 1.9 cm  Left thyroid lobe: 4.6 x 1.4 x 1.6 cm  Thyroid gland echotexture: homogeneous  Thyroid gland vascularity on color Doppler: normal    Nodules Right  "Lobe:    2.8 x 1.8 x 2.0 cm (previous 2.7 x 1.8 x 2.2 cm) isoechoic solid circumscribed  heterogeneous vascular nodule in the mid to lower pole, previously biopsied and  stable    Nodules Left Lobe:    0.3 x 0.1 x 0.3 cm (previous 0.4 x 0.2 x 0.3 cm) hypoechoic circumscribed  nonvascular nodule in the lower pole, stable    1.0 x 0.6 x 0.5 cm hypoechoic complex circumscribed nonvascular nodule in the  lower pole, new, with low sonographic suspicion pattern        Isthmus measures 0.2 cm  Exam End: 12/14/23  9:46 AM    Specimen Collected: 12/14/23  5:09 PM Last Resulted: 12/14/23 11:44 PM   Received From: [facility]  Result Received: 12/15/23 10:03 AM         I have personally reviewed pertinent reports.      Portions of the record may have been created with voice recognition software. Occasional wrong word or \"sound a like\" substitutions may have occurred due to the inherent limitations of voice recognition software. Read the chart carefully and recognize, using context, where substitutions have occurred.    "

## 2024-05-08 NOTE — LETTER
May 8, 2024       No Recipients    Patient: Lorraine Klein   YOB: 1990   Date of Visit: 5/8/2024       Dear Dr. Anderson:    Thank you for referring Lorraine Klein to me for evaluation. Below are my notes for this consultation.    If you have questions, please do not hesitate to call me. I look forward to following your patient along with you.         Sincerely,        Tatyana Bartlett MD        CC:   No Recipients    Tatyana Bartlett MD  5/8/2024  8:14 PM  Incomplete   Lorraine Klein 33 y.o. female MRN: [id number]    Encounter: [id number]      Assessment/Plan    Problem List Items Addressed This Visit          Endocrine    Thyroid nodule     S/p fine-needle aspiration biopsy-right-sided thyroid nodule in 2021-thyroid nodule has remained stable on serial imaging-continue to monitor            Obstetrics/Gynecology    17 weeks gestation of pregnancy       Other    Low TSH level - Primary     Labs reviewed with the patient and her family member-TSH is mildly low and was normal prior to pregnancy-this is likely due to normal physiological changes of pregnancy.  Will monitor and repeat TSH and free T4 in the next month.  As long as TSH remains stable or improves labs can be monitored every trimester.  If TSH is suppressed consider checking a TSI         Relevant Orders    T4, free    TSH, 3rd generation      CC:   Low TSH   History of Present Illness    HPI:  33-year-old female evaluation of low TSH -she is currently 17 weeks pregnant and Pregnancy is progressing well  No vomiting , some nausea , gained a few lbs     Occasional palpitations   Sleeping better     She has history of thyroid nodules and underwent fine-needle aspiration biopsy of the right-sided thyroid nodule in 2021 which was negative for malignancy-thyroid nodule has remained stable on serial imaging  This is her second pregnancy  - 2nd IVF   Required IVF due to male factor         Review of Systems    Historical Information  Past Medical History:  "  Diagnosis Date   • Allergic    • Anxiety    • Kidney stone    • Nephrolithiasis 07/10/2018     Past Surgical History:   Procedure Laterality Date   •  SECTION  4/3/2015   • [place] CYSTO/URETERO W/LITHOTRIPSY &INDWELL STENT INSRT Right 2018    Procedure: CYSTOSCOPY URETEROSCOPY; RETROGRADE PYELOGRAM AND INSERTION STENT URETERAL;  Surgeon: Gerry Guillen MD;  Location: [facility] OR;  Service: Urology   • US GUIDED THYROID BIOPSY  2021     Social History  Social History     Substance and Sexual Activity   Alcohol Use Yes    Comment: socially     Social History     Substance and Sexual Activity   Drug Use No     Social History     Tobacco Use   Smoking Status Never   Smokeless Tobacco Never     Family History:   Family History   Problem Relation Age of Onset   • No Known Problems Mother    • Nephrolithiasis Father    • Nephrolithiasis Sister    • Nephrolithiasis Paternal Aunt    • Nephrolithiasis Paternal Uncle    • Diabetes Maternal Grandmother    • Prostate cancer Maternal Grandfather    • Diabetes Paternal Grandmother    • Thyroid disease unspecified Paternal Grandfather    • Diabetes Paternal Grandfather      MGF - thyroid disorder       Meds/Allergies  Current Outpatient Medications   Medication Sig Dispense Refill   • escitalopram (LEXAPRO) 10 mg tablet TAKE 1 TABLET DAILY FOR ANXIETY 90 tablet 3     No current facility-administered medications for this visit.     Allergies   Allergen Reactions   • Bee Pollen Other (See Comments)   • Pollen Extract        Objective  Vitals: Blood pressure 124/84, pulse (!) 110, height 5' 9\" (1.753 m), weight 96 kg (211 lb 9.6 oz), last menstrual period 2024, SpO2 99%.    Physical Exam  Vitals reviewed.   Constitutional:       General: She is not in acute distress.     Appearance: Normal appearance. She is obese. She is not ill-appearing, toxic-appearing or diaphoretic.   HENT:      Head: Normocephalic and atraumatic.   Eyes:      General: No scleral " icterus.     Extraocular Movements: Extraocular movements intact.   Neck:      Comments: About 2 cm right-sided thyroid nodule palpated  Cardiovascular:      Rate and Rhythm: Normal rate and regular rhythm.      Heart sounds: Normal heart sounds. No murmur heard.  Pulmonary:      Effort: Pulmonary effort is normal. No respiratory distress.      Breath sounds: Normal breath sounds. No wheezing or rales.   Musculoskeletal:      Cervical back: Neck supple.      Right lower leg: No edema.      Left lower leg: No edema.   Lymphadenopathy:      Cervical: No cervical adenopathy.   Skin:     General: Skin is warm and dry.   Neurological:      General: No focal deficit present.      Mental Status: She is alert and oriented to person, place, and time.      Gait: Gait normal.   Psychiatric:         Mood and Affect: Mood normal.         Behavior: Behavior normal.         Thought Content: Thought content normal.         Judgment: Judgment normal.         The history was obtained from the review of the chart, patient and family.    Lab Results:            Component  Ref Range & Units 3/13/24 10:03 AM 23 11:03 AM 23 10:45 AM   TSH  0.45 - 5.33 uIU/mL 0.35 Low  0.88 0.69          Lab Results   Component Value Date/Time    FREE T4 0.88 2024 10:03 AM     3 yr ago    Non-Gynecological Cytology [facility]  [address] [place] [id number] [phone]    PATHOLOGY REPORT            MR#:             [id number]  Patient:         ROLAND HINES  /Sex:         1990  F  Provider:        ADITI BARBER  Location:        [facility] ([facility])  Collect Date:    2021  [id number]    CYTOLOGIC DIAGNOSIS:  A  Thyroid Fine Needle Aspiration with Cytotech Assistance, Right  Nodule  Adequacy: Adequate for evaluation.  General Category: II. Benign  Descriptive Interpretation: Findings are suggestive of nodular  hyperplasia. Follicular cells, colloid and hemosiderin laden  macrophages  are present.        Electronically Signed Out By Amrik Gutierrez MD           Imaging Studies:   Results for orders placed during the hospital encounter of 03/18/21    US thyroid    Impression  The following meet current ACR criteria for recommending ultrasound guided biopsy:      The 2.6 cm right lower pole nodule. (Image number 14) (CRITERIA: TR 3, Mildly suspicious. FNA if >2.5 cm.    This nodule correlates to the area of palpable concern and is hypervascular.    Reference: ACR Thyroid Imaging, Reporting and Data System (TI-RADS): White Paper of the ACR TI-RADS Committee. J AM Renetta Radiol 2017;14:587-595. (additional recommendations based on American Thyroid Association 2015 guidelines.)      Workstation performed: [id number]      IMPRESSION: Homogeneous thyroid with bilateral thyroid nodules as described  above.    Sonographic pattern and FNA recommendations are based on American Thyroid  Association (DIANE) guidelines for assessment of thyroid nodules, as agreed upon  by multidisciplinary departments at [facility].    Sonographic pattern  Benign pattern (0% risk): no biopsy  Very low suspicion pattern (<3% risk): biopsy if > or = 2 cm (or ultrasound  observation)  Low suspicion pattern (5-10% risk): biopsy if > or = 1.5 cm  Intermediate suspicion pattern (10-20% risk): biopsy if > or = 1 cm  High suspicion pattern (>70-90% risk): biopsy if > or = 1 cm (for nodules <1 cm,  biopsy could be considered if technically feasible)          Workstation:[id number]  Narrative    This result has an attachment that is not available.  History: Multiple thyroid nodules    Ultrasound of the thyroid gland was performed with color-flow imaging    Comparison Studies: 11/14/2022    Findings:    Right thyroid lobe: 4.5 x 2.2 x 1.9 cm  Left thyroid lobe: 4.6 x 1.4 x 1.6 cm  Thyroid gland echotexture: homogeneous  Thyroid gland vascularity on color Doppler: normal    Nodules Right Lobe:    2.8 x 1.8 x 2.0 cm (previous 2.7 x 1.8 x 2.2 cm)  "isoechoic solid circumscribed  heterogeneous vascular nodule in the mid to lower pole, previously biopsied and  stable    Nodules Left Lobe:    0.3 x 0.1 x 0.3 cm (previous 0.4 x 0.2 x 0.3 cm) hypoechoic circumscribed  nonvascular nodule in the lower pole, stable    1.0 x 0.6 x 0.5 cm hypoechoic complex circumscribed nonvascular nodule in the  lower pole, new, with low sonographic suspicion pattern        Isthmus measures 0.2 cm  Exam End: 23  9:46 AM    Specimen Collected: 23  5:09 PM Last Resulted: 23 11:44 PM   Received From: [facility]  Result Received: 12/15/23 10:03 AM         I have personally reviewed pertinent reports.      Portions of the record may have been created with voice recognition software. Occasional wrong word or \"sound a like\" substitutions may have occurred due to the inherent limitations of voice recognition software. Read the chart carefully and recognize, using context, where substitutions have occurred.      Tatyana Bartlett MD  2024  8:09 PM  Sign when Signing Visit   Lorraine Klein 33 y.o. female MRN: [id number]    Encounter: [id number]      Assessment/Plan    Problem List Items Addressed This Visit    None     CC:   Low TSH   History of Present Illness    HPI:  33-year-old female evaluation of low TSH -she is currently 17 weeks pregnant     Pregnancy progressing well  No vomiting , some nausea , gained a few lbs     Occasional palpitations   Sleeping better     No       Second pregnancy  - 2nd IVF   Required IVF - male factor         Review of Systems    Historical Information  Past Medical History:   Diagnosis Date   • Allergic    • Anxiety    • Kidney stone    • Nephrolithiasis 07/10/2018     Past Surgical History:   Procedure Laterality Date   •  SECTION  4/3/2015   • [place] CYSTO/URETERO W/LITHOTRIPSY &INDWELL STENT INSRT Right 2018    Procedure: CYSTOSCOPY URETEROSCOPY; RETROGRADE PYELOGRAM AND INSERTION STENT URETERAL;  Surgeon: " "Gerry Guillen MD;  Location: [facility];  Service: Urology   • US GUIDED THYROID BIOPSY  4/12/2021     Social History  Social History     Substance and Sexual Activity   Alcohol Use Yes    Comment: socially     Social History     Substance and Sexual Activity   Drug Use No     Social History     Tobacco Use   Smoking Status Never   Smokeless Tobacco Never     Family History:   Family History   Problem Relation Age of Onset   • Nephrolithiasis Father    • No Known Problems Mother    • Prostate cancer Maternal Grandfather    • Diabetes Maternal Grandmother    • Diabetes Paternal Grandfather    • Diabetes Paternal Grandmother      MGF - thyroid disorder       Meds/Allergies  Current Outpatient Medications   Medication Sig Dispense Refill   • escitalopram (LEXAPRO) 10 mg tablet TAKE 1 TABLET DAILY FOR ANXIETY 90 tablet 3   • BD Insulin Syringe U/F 30G X 1/2\" 0.5 ML MISC  (Patient not taking: Reported on 9/20/2022)     • estradiol (ESTRACE) 2 MG tablet  (Patient not taking: Reported on 4/5/2024)     • Insulin Syringe-Needle U-100 29G X 1/2\" 0.5 ML MISC Use as directed (Patient not taking: Reported on 9/20/2022)     • predniSONE 5 mg tablet TAKE 1 TABLET DAILY BY MOUTH IN MORNING (Patient not taking: Reported on 4/5/2024)     • progesterone 50 mg/mL injection  (Patient not taking: Reported on 4/5/2024)       No current facility-administered medications for this visit.     Allergies   Allergen Reactions   • Bee Pollen Other (See Comments)   • Pollen Extract        Objective  Vitals: Blood pressure 124/84, pulse (!) 110, height 5' 9\" (1.753 m), weight 96 kg (211 lb 9.6 oz), last menstrual period 01/08/2024, SpO2 99%.    Physical Exam    The history was obtained from the review of the chart, {HISTORY OBTAINED FROM:[id number]}.    Lab Results:   Lab Results   Component Value Date/Time    FREE T4 0.88 03/13/2024 10:03 AM     3 yr ago    Non-Gynecological Cytology [facility]  [address], [place], " [place] [id number] [phone]    PATHOLOGY REPORT            MR#:             [id number]  Patient:         ROLAND HINES  /Sex:         1990  F  Provider:        ADITI BARBER  Location:        [facility] ([facility])  Collect Date:    2021  [id number]    CYTOLOGIC DIAGNOSIS:  A  Thyroid Fine Needle Aspiration with Cytotech Assistance, Right  Nodule  Adequacy: Adequate for evaluation.  General Category: II. Benign  Descriptive Interpretation: Findings are suggestive of nodular  hyperplasia. Follicular cells, colloid and hemosiderin laden  macrophages are present.        Electronically Signed Out By Amrik Gutierrez MD           Imaging Studies:   Results for orders placed during the hospital encounter of 21    US thyroid    Impression  The following meet current ACR criteria for recommending ultrasound guided biopsy:      The 2.6 cm right lower pole nodule. (Image number 14) (CRITERIA: TR 3, Mildly suspicious. FNA if >2.5 cm.    This nodule correlates to the area of palpable concern and is hypervascular.    Reference: ACR Thyroid Imaging, Reporting and Data System (TI-RADS): White Paper of the ACR TI-RADS Committee. J AM Renetta Radiol 2017;14:587-595. (additional recommendations based on American Thyroid Association 2015 guidelines.)      Workstation performed: [id number]      IMPRESSION: Homogeneous thyroid with bilateral thyroid nodules as described  above.    Sonographic pattern and FNA recommendations are based on American Thyroid  Association (DIANE) guidelines for assessment of thyroid nodules, as agreed upon  by multidisciplinary departments at [facility].    Sonographic pattern  Benign pattern (0% risk): no biopsy  Very low suspicion pattern (<3% risk): biopsy if > or = 2 cm (or ultrasound  observation)  Low suspicion pattern (5-10% risk): biopsy if > or = 1.5 cm  Intermediate suspicion pattern (10-20% risk): biopsy if > or = 1 cm  High suspicion pattern (>70-90% risk): biopsy if > or = 1  "cm (for nodules <1 cm,  biopsy could be considered if technically feasible)          Workstation:[id number]  Narrative    This result has an attachment that is not available.  History: Multiple thyroid nodules    Ultrasound of the thyroid gland was performed with color-flow imaging    Comparison Studies: 11/14/2022    Findings:    Right thyroid lobe: 4.5 x 2.2 x 1.9 cm  Left thyroid lobe: 4.6 x 1.4 x 1.6 cm  Thyroid gland echotexture: homogeneous  Thyroid gland vascularity on color Doppler: normal    Nodules Right Lobe:    2.8 x 1.8 x 2.0 cm (previous 2.7 x 1.8 x 2.2 cm) isoechoic solid circumscribed  heterogeneous vascular nodule in the mid to lower pole, previously biopsied and  stable    Nodules Left Lobe:    0.3 x 0.1 x 0.3 cm (previous 0.4 x 0.2 x 0.3 cm) hypoechoic circumscribed  nonvascular nodule in the lower pole, stable    1.0 x 0.6 x 0.5 cm hypoechoic complex circumscribed nonvascular nodule in the  lower pole, new, with low sonographic suspicion pattern        Isthmus measures 0.2 cm  Exam End: 12/14/23  9:46 AM    Specimen Collected: 12/14/23  5:09 PM Last Resulted: 12/14/23 11:44 PM   Received From: [facility]  Result Received: 12/15/23 10:03 AM         {Results Review Statement:34952}    Portions of the record may have been created with voice recognition software. Occasional wrong word or \"sound a like\" substitutions may have occurred due to the inherent limitations of voice recognition software. Read the chart carefully and recognize, using context, where substitutions have occurred.    "

## 2024-05-09 NOTE — ASSESSMENT & PLAN NOTE
Labs reviewed with the patient and her family member-TSH is mildly low and was normal prior to pregnancy-this is likely due to normal physiological changes of pregnancy.  Will monitor and repeat TSH and free T4 in the next month.  As long as TSH remains stable or improves labs can be monitored every trimester.  If TSH is suppressed consider checking a TSI

## 2024-05-09 NOTE — ASSESSMENT & PLAN NOTE
S/p fine-needle aspiration biopsy-right-sided thyroid nodule in 2021-thyroid nodule has remained stable on serial imaging-continue to monitor

## 2024-05-28 PROBLEM — E04.2 MULTIPLE THYROID NODULES: Status: ACTIVE | Noted: 2021-05-14

## 2024-05-28 PROBLEM — O34.219 HISTORY OF CESAREAN DELIVERY, ANTEPARTUM: Status: ACTIVE | Noted: 2024-05-28

## 2024-05-28 PROBLEM — O09.812 PREGNANCY RESULTING FROM ASSISTED REPRODUCTIVE TECHNOLOGY IN SECOND TRIMESTER: Status: ACTIVE | Noted: 2024-05-28

## 2024-05-28 PROBLEM — Z82.49 FAMILY HISTORY OF CARDIAC DISORDER IN FATHER: Status: ACTIVE | Noted: 2024-05-28

## 2024-05-28 PROBLEM — O09.522 MULTIGRAVIDA OF ADVANCED MATERNAL AGE IN SECOND TRIMESTER: Status: ACTIVE | Noted: 2024-05-28

## 2024-05-30 ENCOUNTER — ROUTINE PRENATAL (OUTPATIENT)
Dept: PERINATAL CARE | Facility: OTHER | Age: 34
End: 2024-05-30
Payer: COMMERCIAL

## 2024-05-30 VITALS
HEART RATE: 99 BPM | SYSTOLIC BLOOD PRESSURE: 112 MMHG | HEIGHT: 69 IN | WEIGHT: 213 LBS | DIASTOLIC BLOOD PRESSURE: 76 MMHG | BODY MASS INDEX: 31.55 KG/M2

## 2024-05-30 DIAGNOSIS — O09.899 SUPERVISION OF OTHER HIGH RISK PREGNANCIES, UNSPECIFIED TRIMESTER: ICD-10-CM

## 2024-05-30 DIAGNOSIS — Z3A.20 20 WEEKS GESTATION OF PREGNANCY: ICD-10-CM

## 2024-05-30 DIAGNOSIS — O09.812 PREGNANCY RESULTING FROM ASSISTED REPRODUCTIVE TECHNOLOGY IN SECOND TRIMESTER: ICD-10-CM

## 2024-05-30 DIAGNOSIS — Z36.86 ENCOUNTER FOR ANTENATAL SCREENING FOR CERVICAL LENGTH: ICD-10-CM

## 2024-05-30 DIAGNOSIS — O09.522 MULTIGRAVIDA OF ADVANCED MATERNAL AGE IN SECOND TRIMESTER: Primary | ICD-10-CM

## 2024-05-30 DIAGNOSIS — Z36.3 ENCOUNTER FOR ANTENATAL SCREENING FOR MALFORMATIONS: ICD-10-CM

## 2024-05-30 DIAGNOSIS — O34.219 HISTORY OF CESAREAN DELIVERY, ANTEPARTUM: ICD-10-CM

## 2024-05-30 PROCEDURE — 76817 TRANSVAGINAL US OBSTETRIC: CPT | Performed by: STUDENT IN AN ORGANIZED HEALTH CARE EDUCATION/TRAINING PROGRAM

## 2024-05-30 PROCEDURE — 76811 OB US DETAILED SNGL FETUS: CPT | Performed by: STUDENT IN AN ORGANIZED HEALTH CARE EDUCATION/TRAINING PROGRAM

## 2024-05-30 PROCEDURE — 99244 OFF/OP CNSLTJ NEW/EST MOD 40: CPT | Performed by: STUDENT IN AN ORGANIZED HEALTH CARE EDUCATION/TRAINING PROGRAM

## 2024-05-30 RX ORDER — ASPIRIN 81 MG/1
162 TABLET, CHEWABLE ORAL DAILY
COMMUNITY

## 2024-05-30 RX ORDER — FLUTICASONE PROPIONATE 50 MCG
1 SPRAY, SUSPENSION (ML) NASAL DAILY
COMMUNITY

## 2024-05-30 RX ORDER — CETIRIZINE HYDROCHLORIDE 5 MG/1
5 TABLET, CHEWABLE ORAL DAILY
COMMUNITY

## 2024-05-30 NOTE — LETTER
"May 30, 2024     Eneida Ponce DO  [address] [facility]  [address] [place] [address]    Patient: Lorraine Klein   YOB: 1990   Date of Visit: 2024       Dear Dr. Ponce:    Thank you for referring Lorraine Klein to me for evaluation. Below are my notes for this consultation.    If you have questions, please do not hesitate to call me. I look forward to following your patient along with you.         Sincerely,        Cecy Palumbo MD        CC: No Recipients    Cecy Palumbo MD  2024 11:19 AM  Sign when Signing Visit  [facility]: Ms. Klein was seen today for anatomic survey and cervical length screening ultrasound.  See ultrasound report under \"OB Procedures\" tab.      Physical Exam  Constitutional:       General: She is not in acute distress.     Appearance: Normal appearance.   HENT:      Head: Normocephalic and atraumatic.   Eyes:      Extraocular Movements: Extraocular movements intact.   Cardiovascular:      Rate and Rhythm: Normal rate.   Pulmonary:      Effort: Pulmonary effort is normal. No respiratory distress.   Skin:     Findings: No erythema or rash.   Neurological:      Mental Status: She is alert and oriented to person, place, and time.   Psychiatric:         Mood and Affect: Mood normal.         Behavior: Behavior normal.         Please don't hesitate to contact our office with any concerns or questions.  -Cecy Palumbo MD    "

## 2024-05-30 NOTE — PROGRESS NOTES
"[facility]: Ms. Klein was seen today for anatomic survey and cervical length screening ultrasound.  See ultrasound report under \"OB Procedures\" tab.      Physical Exam  Constitutional:       General: She is not in acute distress.     Appearance: Normal appearance.   HENT:      Head: Normocephalic and atraumatic.   Eyes:      Extraocular Movements: Extraocular movements intact.   Cardiovascular:      Rate and Rhythm: Normal rate.   Pulmonary:      Effort: Pulmonary effort is normal. No respiratory distress.   Skin:     Findings: No erythema or rash.   Neurological:      Mental Status: She is alert and oriented to person, place, and time.   Psychiatric:         Mood and Affect: Mood normal.         Behavior: Behavior normal.         Please don't hesitate to contact our office with any concerns or questions.  -Cecy Palumbo MD    "

## 2024-05-31 ENCOUNTER — TELEPHONE (OUTPATIENT)
Dept: ENDOCRINOLOGY | Facility: CLINIC | Age: 34
End: 2024-05-31

## 2024-05-31 ENCOUNTER — PATIENT MESSAGE (OUTPATIENT)
Dept: ENDOCRINOLOGY | Facility: CLINIC | Age: 34
End: 2024-05-31

## 2024-05-31 DIAGNOSIS — R79.89 LOW TSH LEVEL: Primary | ICD-10-CM

## 2024-06-01 NOTE — TELEPHONE ENCOUNTER
Labs reviewed-TSH has been slightly low and recent labs show slightly low free T4.  Free T4 not be very reliable in pregnancy so for now would suggest checking a total T4.  If total T4 is high or high end of normal that would be inappropriate level for pregnancy however if total T4 is lowish then consider levothyroxine

## 2024-06-03 LAB
T4 FREE SERPL-MCNC: 0.58 NG/DL (ref 0.61–1.12)
T4 SERPL-MCNC: 11.48 UG/DL (ref 6.09–12.23)
TSH SERPL-ACNC: 0.37 UIU/ML (ref 0.45–5.33)

## 2024-06-05 ENCOUNTER — OFFICE VISIT (OUTPATIENT)
Dept: FAMILY MEDICINE CLINIC | Facility: CLINIC | Age: 34
End: 2024-06-05
Payer: COMMERCIAL

## 2024-06-05 VITALS
HEART RATE: 118 BPM | SYSTOLIC BLOOD PRESSURE: 118 MMHG | TEMPERATURE: 98 F | DIASTOLIC BLOOD PRESSURE: 60 MMHG | BODY MASS INDEX: 31.84 KG/M2 | OXYGEN SATURATION: 98 % | WEIGHT: 215 LBS | HEIGHT: 69 IN

## 2024-06-05 DIAGNOSIS — J45.21 MILD INTERMITTENT REACTIVE AIRWAY DISEASE WITH ACUTE EXACERBATION: Primary | ICD-10-CM

## 2024-06-05 DIAGNOSIS — Z12.4 SCREENING FOR CERVICAL CANCER: ICD-10-CM

## 2024-06-05 DIAGNOSIS — Z11.59 NEED FOR HEPATITIS C SCREENING TEST: ICD-10-CM

## 2024-06-05 DIAGNOSIS — R79.89 LOW TSH LEVEL: Primary | ICD-10-CM

## 2024-06-05 DIAGNOSIS — Z11.4 SCREENING FOR HIV (HUMAN IMMUNODEFICIENCY VIRUS): ICD-10-CM

## 2024-06-05 DIAGNOSIS — Z3A.21 21 WEEKS GESTATION OF PREGNANCY: ICD-10-CM

## 2024-06-05 PROCEDURE — 99213 OFFICE O/P EST LOW 20 MIN: CPT | Performed by: FAMILY MEDICINE

## 2024-06-05 RX ORDER — AZITHROMYCIN 250 MG/1
TABLET, FILM COATED ORAL
Qty: 6 TABLET | Refills: 0 | Status: SHIPPED | OUTPATIENT
Start: 2024-06-05 | End: 2024-06-12

## 2024-06-05 RX ORDER — ALBUTEROL SULFATE 90 UG/1
2 AEROSOL, METERED RESPIRATORY (INHALATION) EVERY 6 HOURS PRN
Qty: 18 G | Refills: 5 | Status: SHIPPED | OUTPATIENT
Start: 2024-06-05

## 2024-06-05 NOTE — PROGRESS NOTES
Assessment/Plan: Patient will call with any new persisting or worsening symptoms.  Start azithromycin if any fevers or worsening symptoms.     1. Mild intermittent reactive airway disease with acute exacerbation  -     albuterol (Ventolin HFA) 90 mcg/act inhaler; Inhale 2 puffs every 6 (six) hours as needed for wheezing  -     azithromycin (ZITHROMAX) 250 mg tablet; Take 2 tablets today then 1 tablet daily x 4 days  2. Need for hepatitis C screening test  3. Screening for HIV (human immunodeficiency virus)  4. Screening for cervical cancer  5. 21 weeks gestation of pregnancy        Subjective:      Patient ID: Lorraine Klein is a 33 y.o. female.    Patient is seen today for cough.  She has some allergy symptoms.  She has occasional wheeze.  She is 21 weeks pregnant.  Pregnancy is going well without complication.  Denies any fevers.  Cough is generally nonproductive.  No chest pain or shortness of breath.    Cough  This is a new problem. The current episode started in the past 7 days. The problem has been waxing and waning. The problem occurs every few hours. The cough is Non-productive. Associated symptoms include nasal congestion, postnasal drip and shortness of breath. Pertinent negatives include no chest pain, chills, ear congestion, ear pain, fever, headaches, heartburn, hemoptysis, myalgias, rash, rhinorrhea, sore throat, sweats, weight loss or wheezing. The symptoms are aggravated by lying down and pollens.            The following portions of the patient's history were reviewed and updated as appropriate: allergies, current medications, past family history, past medical history, past social history, past surgical history, and problem list.    Review of Systems   Constitutional:  Negative for chills, fever and weight loss.   HENT:  Positive for postnasal drip. Negative for ear pain, rhinorrhea and sore throat.    Respiratory:  Positive for cough and shortness of breath. Negative for hemoptysis and wheezing.   "  Cardiovascular:  Negative for chest pain.   Gastrointestinal:  Negative for heartburn.   Musculoskeletal:  Negative for myalgias.   Skin:  Negative for rash.   Neurological:  Negative for headaches.         Objective:      /60 (BP Location: Right arm, Patient Position: Sitting)   Pulse (!) 118   Temp 98 °F (36.7 °C) (Tympanic)   Ht 5' 9\" (1.753 m)   Wt 97.5 kg (215 lb)   LMP 01/08/2024 (Within Weeks)   SpO2 98%   BMI 31.75 kg/m²          Physical Exam  Vitals reviewed.   Constitutional:       Appearance: She is well-developed.   HENT:      Head: Normocephalic and atraumatic.      Right Ear: External ear normal. Tympanic membrane is not erythematous or bulging.      Left Ear: External ear normal. Tympanic membrane is not erythematous or bulging.      Nose: Nose normal.      Mouth/Throat:      Mouth: No oral lesions.      Pharynx: No oropharyngeal exudate.   Eyes:      General: No scleral icterus.        Right eye: No discharge.         Left eye: No discharge.      Conjunctiva/sclera: Conjunctivae normal.   Neck:      Thyroid: No thyromegaly.   Cardiovascular:      Rate and Rhythm: Normal rate and regular rhythm.      Heart sounds: Normal heart sounds. No murmur heard.     No friction rub. No gallop.   Pulmonary:      Effort: Pulmonary effort is normal. No respiratory distress.      Breath sounds: Wheezing present. No rales.   Chest:      Chest wall: No tenderness.   Abdominal:      General: Bowel sounds are normal. There is no distension.      Palpations: Abdomen is soft. There is no mass.      Tenderness: There is no abdominal tenderness. There is no guarding or rebound.   Musculoskeletal:         General: No tenderness or deformity. Normal range of motion.      Cervical back: Normal range of motion and neck supple.   Lymphadenopathy:      Cervical: No cervical adenopathy.   Skin:     General: Skin is warm and dry.      Coloration: Skin is not pale.      Findings: No erythema or rash.   Neurological: "      Mental Status: She is alert and oriented to person, place, and time.      Cranial Nerves: No cranial nerve deficit.      Motor: No abnormal muscle tone.      Coordination: Coordination normal.      Deep Tendon Reflexes: Reflexes are normal and symmetric.   Psychiatric:         Behavior: Behavior normal.

## 2024-06-10 LAB
T4 FREE SERPL-MCNC: 0.64 NG/DL (ref 0.61–1.12)
T4 SERPL-MCNC: 12.43 UG/DL (ref 6.09–12.23)
TSH SERPL-ACNC: 0.36 UIU/ML (ref 0.45–5.33)

## 2024-06-11 DIAGNOSIS — R79.89 LOW TSH LEVEL: Primary | ICD-10-CM

## 2024-06-12 LAB — TSI ACT/NOR SER: <0.1 %

## 2024-06-21 ENCOUNTER — ROUTINE PRENATAL (OUTPATIENT)
Dept: PERINATAL CARE | Facility: OTHER | Age: 34
End: 2024-06-21
Payer: COMMERCIAL

## 2024-06-21 VITALS
HEIGHT: 69 IN | HEART RATE: 104 BPM | SYSTOLIC BLOOD PRESSURE: 102 MMHG | DIASTOLIC BLOOD PRESSURE: 70 MMHG | WEIGHT: 216.8 LBS | BODY MASS INDEX: 32.11 KG/M2

## 2024-06-21 DIAGNOSIS — Z3A.23 23 WEEKS GESTATION OF PREGNANCY: ICD-10-CM

## 2024-06-21 DIAGNOSIS — O09.812 PREGNANCY RESULTING FROM ASSISTED REPRODUCTIVE TECHNOLOGY IN SECOND TRIMESTER: Primary | ICD-10-CM

## 2024-06-21 PROCEDURE — 76815 OB US LIMITED FETUS(S): CPT | Performed by: OBSTETRICS & GYNECOLOGY

## 2024-06-21 PROCEDURE — 76827 ECHO EXAM OF FETAL HEART: CPT | Performed by: OBSTETRICS & GYNECOLOGY

## 2024-06-21 PROCEDURE — 93325 DOPPLER ECHO COLOR FLOW MAPG: CPT | Performed by: OBSTETRICS & GYNECOLOGY

## 2024-06-21 PROCEDURE — 99213 OFFICE O/P EST LOW 20 MIN: CPT | Performed by: OBSTETRICS & GYNECOLOGY

## 2024-06-21 PROCEDURE — 76825 ECHO EXAM OF FETAL HEART: CPT | Performed by: OBSTETRICS & GYNECOLOGY

## 2024-06-21 RX ORDER — FAMOTIDINE 20 MG/1
20 TABLET, FILM COATED ORAL 2 TIMES DAILY
COMMUNITY

## 2024-06-21 NOTE — PROGRESS NOTES
"[facility]: Lorraine Klein was seen today for fetal screening echocardiogram.  See ultrasound report under \"OB Procedures\" tab.   The time spent on this established patient on the encounter date included 5 minutes previsit service time reviewing records and precharting, 10 minutes face-to-face service time counseling regarding results and coordinating care, and  5 minutes charting, totalling 20 minutes.  Please don't hesitate to contact our office with any concerns or questions.  -Rebecca Dillon MD    "

## 2024-07-09 ENCOUNTER — OFFICE VISIT (OUTPATIENT)
Dept: FAMILY MEDICINE CLINIC | Facility: CLINIC | Age: 34
End: 2024-07-09
Payer: COMMERCIAL

## 2024-07-09 VITALS
BODY MASS INDEX: 31.93 KG/M2 | HEART RATE: 118 BPM | TEMPERATURE: 98.1 F | HEIGHT: 69 IN | OXYGEN SATURATION: 98 % | WEIGHT: 215.6 LBS | SYSTOLIC BLOOD PRESSURE: 124 MMHG | DIASTOLIC BLOOD PRESSURE: 82 MMHG

## 2024-07-09 DIAGNOSIS — J02.9 PHARYNGITIS, UNSPECIFIED ETIOLOGY: Primary | ICD-10-CM

## 2024-07-09 LAB
S PYO AG THROAT QL: NEGATIVE
SARS-COV-2 AG UPPER RESP QL IA: NEGATIVE
VALID CONTROL: NORMAL

## 2024-07-09 PROCEDURE — 87811 SARS-COV-2 COVID19 W/OPTIC: CPT | Performed by: FAMILY MEDICINE

## 2024-07-09 PROCEDURE — 99213 OFFICE O/P EST LOW 20 MIN: CPT | Performed by: FAMILY MEDICINE

## 2024-07-09 PROCEDURE — 87880 STREP A ASSAY W/OPTIC: CPT | Performed by: FAMILY MEDICINE

## 2024-07-09 NOTE — PROGRESS NOTES
Ambulatory Visit  Name: Lorraine Klein      : 1990      MRN: [id number]  Encounter Provider: Laura Mcdonough DO  Encounter Date: 2024   Encounter department: [facility]    Assessment & Plan   1. Pharyngitis, unspecified etiology  Assessment & Plan:  Patient with signs and symptoms consistent with pharyngitis of nonspecific etiology.  Suspect viral source given duration of symptoms.  Rapid COVID and strep testing negative at today's visit.  Patient is counseled on continued conservative and symptomatic management.    Recommend continued conservative management including increased rest and hydration.     Advised on ED evaluation should symptoms of shortness of breath worsen or note associated chest pain.    Recommend continued as needed inhaler use.    Commend follow-up in 1 week or sooner as needed should symptoms persist or worsen.  Orders:  -     POCT Rapid Covid Ag  -     POCT rapid ANTIGEN strepA       History of Present Illness     Patient notes that she started 4 days ago with a sore throat.  Notes that this has been worsening with associated head congestion.  Notes that this is moving more into coughing.  She notes using an inhaler as needed with improvement in symptoms.  Concern for illness turning into bronchitis.  She has been taking Sudafed as needed as directed by her obstetrician.    Sore Throat   This is a new problem. The current episode started in the past 7 days. The problem has been gradually worsening. The pain is worse on the left side. There has been no fever. The pain is at a severity of 0/10. The patient is experiencing no pain. Associated symptoms include congestion, coughing, ear pain, a hoarse voice, a plugged ear sensation, shortness of breath and stridor. Pertinent negatives include no abdominal pain, diarrhea, drooling, ear discharge, headaches, neck pain, swollen glands, trouble swallowing or vomiting.       Review of Systems   HENT:  Positive for  congestion, ear pain, hoarse voice and sore throat. Negative for drooling, ear discharge and trouble swallowing.    Respiratory:  Positive for cough, shortness of breath and stridor.    Gastrointestinal:  Negative for abdominal pain, diarrhea and vomiting.   Musculoskeletal:  Negative for neck pain.   Neurological:  Negative for headaches.     Medical History Reviewed by provider this encounter:       Past Medical History   Past Medical History:   Diagnosis Date   • Allergic    • Anxiety    • Kidney stone    • Nephrolithiasis 07/10/2018     Past Surgical History:   Procedure Laterality Date   •  SECTION  4/3/2015   • [place] CYSTO/URETERO W/LITHOTRIPSY &INDWELL STENT INSRT Right 2018    Procedure: CYSTOSCOPY URETEROSCOPY; RETROGRADE PYELOGRAM AND INSERTION STENT URETERAL;  Surgeon: Gerry Guillen MD;  Location: [facility];  Service: Urology   • US GUIDED THYROID BIOPSY  2021     Family History   Problem Relation Age of Onset   • No Known Problems Mother    • Nephrolithiasis Father    • Nephrolithiasis Sister    • Nephrolithiasis Paternal Aunt    • Nephrolithiasis Paternal Uncle    • Diabetes Maternal Grandmother    • Prostate cancer Maternal Grandfather    • Diabetes Paternal Grandmother    • Thyroid disease unspecified Paternal Grandfather    • Diabetes Paternal Grandfather      Current Outpatient Medications on File Prior to Visit   Medication Sig Dispense Refill   • aspirin 81 mg chewable tablet Chew 162 mg daily     • cetirizine (ZyrTEC) 5 MG chewable tablet Chew 5 mg daily     • escitalopram (LEXAPRO) 10 mg tablet TAKE 1 TABLET DAILY FOR ANXIETY 90 tablet 3   • famotidine (PEPCID) 20 mg tablet Take 20 mg by mouth 2 (two) times a day     • fluticasone (FLONASE) 50 mcg/act nasal spray 1 spray into each nostril daily occasionally     • Prenatal MV-Min-Fe Fum-FA-DHA (PRENATAL 1 PO) Take by mouth     • albuterol (Ventolin HFA) 90 mcg/act inhaler Inhale 2 puffs every 6 (six) hours as needed for  "wheezing (Patient not taking: Reported on 6/21/2024) 18 g 5     No current facility-administered medications on file prior to visit.     Allergies   Allergen Reactions   • Bee Pollen Other (See Comments)   • Pollen Extract       Current Outpatient Medications on File Prior to Visit   Medication Sig Dispense Refill   • aspirin 81 mg chewable tablet Chew 162 mg daily     • cetirizine (ZyrTEC) 5 MG chewable tablet Chew 5 mg daily     • escitalopram (LEXAPRO) 10 mg tablet TAKE 1 TABLET DAILY FOR ANXIETY 90 tablet 3   • famotidine (PEPCID) 20 mg tablet Take 20 mg by mouth 2 (two) times a day     • fluticasone (FLONASE) 50 mcg/act nasal spray 1 spray into each nostril daily occasionally     • Prenatal MV-Min-Fe Fum-FA-DHA (PRENATAL 1 PO) Take by mouth     • albuterol (Ventolin HFA) 90 mcg/act inhaler Inhale 2 puffs every 6 (six) hours as needed for wheezing (Patient not taking: Reported on 6/21/2024) 18 g 5     No current facility-administered medications on file prior to visit.      Social History     Tobacco Use   • Smoking status: Never   • Smokeless tobacco: Never   Vaping Use   • Vaping status: Never Used   Substance and Sexual Activity   • Alcohol use: Yes     Comment: socially   • Drug use: No   • Sexual activity: Yes     Partners: Male     Birth control/protection: None     Objective     /82 (BP Location: Right arm)   Pulse (!) 118   Temp 98.1 °F (36.7 °C) (Tympanic)   Ht 5' 9\" (1.753 m)   Wt 97.8 kg (215 lb 9.6 oz)   LMP 01/08/2024 (Within Weeks)   SpO2 98%   BMI 31.84 kg/m²     Physical Exam  Vitals reviewed.   Constitutional:       General: She is not in acute distress.     Appearance: She is well-developed.   HENT:      Head: Normocephalic and atraumatic.      Right Ear: Tympanic membrane, ear canal and external ear normal.      Left Ear: Tympanic membrane, ear canal and external ear normal.      Nose: Congestion present.      Mouth/Throat:      Mouth: Mucous membranes are moist.      Pharynx: " Oropharynx is clear. Posterior oropharyngeal erythema present.   Eyes:      Conjunctiva/sclera: Conjunctivae normal.      Pupils: Pupils are equal, round, and reactive to light.   Cardiovascular:      Rate and Rhythm: Normal rate and regular rhythm.      Heart sounds: No murmur heard.  Pulmonary:      Effort: Pulmonary effort is normal. No respiratory distress.      Breath sounds: Normal breath sounds.   Abdominal:      Palpations: Abdomen is soft.      Tenderness: There is no abdominal tenderness.   Musculoskeletal:      Cervical back: Neck supple.      Right lower leg: No edema.      Left lower leg: No edema.   Lymphadenopathy:      Cervical: No cervical adenopathy.   Skin:     General: Skin is warm and dry.   Neurological:      General: No focal deficit present.      Mental Status: She is alert and oriented to person, place, and time.   Psychiatric:         Mood and Affect: Mood normal.         Behavior: Behavior normal.       Administrative Statements

## 2024-07-14 PROBLEM — J02.9 PHARYNGITIS: Status: ACTIVE | Noted: 2024-07-14

## 2024-07-14 NOTE — ASSESSMENT & PLAN NOTE
Patient with signs and symptoms consistent with pharyngitis of nonspecific etiology.  Suspect viral source given duration of symptoms.  Rapid COVID and strep testing negative at today's visit.  Patient is counseled on continued conservative and symptomatic management.    Recommend continued conservative management including increased rest and hydration.     Advised on ED evaluation should symptoms of shortness of breath worsen or note associated chest pain.    Recommend continued as needed inhaler use.    Commend follow-up in 1 week or sooner as needed should symptoms persist or worsen.

## 2024-07-15 LAB
T4 FREE SERPL-MCNC: 0.58 NG/DL (ref 0.61–1.12)
T4 SERPL-MCNC: 12.2 UG/DL (ref 6.09–12.23)
TSH SERPL-ACNC: 0.31 UIU/ML (ref 0.45–5.33)

## 2024-07-17 ENCOUNTER — TELEPHONE (OUTPATIENT)
Age: 34
End: 2024-07-17

## 2024-07-17 NOTE — TELEPHONE ENCOUNTER
[facility] calling to ask for labs to be faxed to them at [phone]. Labs faxed via epic system.  No further action needed.

## 2024-07-19 DIAGNOSIS — R79.89 LOW TSH LEVEL: Primary | ICD-10-CM

## 2024-07-20 LAB — T4 FREE SERPL DIALY-MCNC: 0.9 NG/DL

## 2024-07-26 DIAGNOSIS — E04.2 MULTIPLE THYROID NODULES: Primary | ICD-10-CM

## 2024-07-30 ENCOUNTER — TELEPHONE (OUTPATIENT)
Dept: ENDOCRINOLOGY | Facility: CLINIC | Age: 34
End: 2024-07-30

## 2024-07-30 LAB — EXTERNAL SYPHILIS TOTAL IGG/IGM SCREENING: NON REACTIVE

## 2024-07-30 NOTE — TELEPHONE ENCOUNTER
PT aware no other question   PT requested the Lab work to be mailed to paige on file         Placed lab order to be mailed

## 2024-07-30 NOTE — TELEPHONE ENCOUNTER
----- Message from LIGIA Torres sent at 7/26/2024  9:58 AM EDT -----  T4 by dialysis is normal for pregnancy range. Will continue to monitor.

## 2024-08-13 LAB
T4 FREE SERPL-MCNC: 0.47 NG/DL (ref 0.61–1.12)
TSH SERPL-ACNC: 0.47 UIU/ML (ref 0.45–5.33)

## 2024-08-22 ENCOUNTER — ULTRASOUND (OUTPATIENT)
Dept: PERINATAL CARE | Facility: OTHER | Age: 34
End: 2024-08-22
Payer: COMMERCIAL

## 2024-08-22 ENCOUNTER — DOCUMENTATION (OUTPATIENT)
Dept: PERINATAL CARE | Facility: OTHER | Age: 34
End: 2024-08-22

## 2024-08-22 VITALS
HEIGHT: 69 IN | HEART RATE: 106 BPM | SYSTOLIC BLOOD PRESSURE: 124 MMHG | DIASTOLIC BLOOD PRESSURE: 64 MMHG | WEIGHT: 220.8 LBS | BODY MASS INDEX: 32.7 KG/M2

## 2024-08-22 DIAGNOSIS — O34.219 HISTORY OF CESAREAN DELIVERY, ANTEPARTUM: ICD-10-CM

## 2024-08-22 DIAGNOSIS — O09.523 MULTIGRAVIDA OF ADVANCED MATERNAL AGE IN THIRD TRIMESTER: ICD-10-CM

## 2024-08-22 DIAGNOSIS — Z36.89 ENCOUNTER FOR ULTRASOUND TO ASSESS FETAL GROWTH: ICD-10-CM

## 2024-08-22 DIAGNOSIS — O09.813 PREGNANCY RESULTING FROM IN VITRO FERTILIZATION IN THIRD TRIMESTER: Primary | ICD-10-CM

## 2024-08-22 DIAGNOSIS — Z3A.32 32 WEEKS GESTATION OF PREGNANCY: ICD-10-CM

## 2024-08-22 PROCEDURE — 76816 OB US FOLLOW-UP PER FETUS: CPT | Performed by: STUDENT IN AN ORGANIZED HEALTH CARE EDUCATION/TRAINING PROGRAM

## 2024-08-22 NOTE — PROGRESS NOTES
This patient received  care under my supervision on 24 at 32w4d gestational age at [facility].  The note is contained in the ultrasound report located under OB Procedures tab in Epic.  Please call our office at [phone] with questions.  -Cecy Palumbo MD

## 2024-08-22 NOTE — LETTER
2024     Eneida Ponce DO  [address] [facility]  [address] [place] [address]    Patient: Lorraine Klein   YOB: 1990   Date of Visit: 2024       Dear Dr. Ponce:    Thank you for referring oLrraine Klein to me for evaluation. Below are my notes for this consultation.    If you have questions, please do not hesitate to call me. I look forward to following your patient along with you.         Sincerely,        Cecy Palumbo MD        CC: No Recipients    Cecy Palumbo MD  2024  2:44 PM  Sign when Signing Visit  This patient received  care under my supervision on 24 at 32w4d gestational age at [facility].  The note is contained in the ultrasound report located under OB Procedures tab in Epic.  Please call our office at [phone] with questions.  -Cecy Palumbo MD

## 2024-08-27 ENCOUNTER — OFFICE VISIT (OUTPATIENT)
Dept: ENDOCRINOLOGY | Facility: CLINIC | Age: 34
End: 2024-08-27
Payer: COMMERCIAL

## 2024-08-27 VITALS — WEIGHT: 222.4 LBS | HEIGHT: 69 IN | BODY MASS INDEX: 32.94 KG/M2

## 2024-08-27 DIAGNOSIS — E04.1 THYROID NODULE: ICD-10-CM

## 2024-08-27 DIAGNOSIS — R79.89 LOW TSH LEVEL: Primary | ICD-10-CM

## 2024-08-27 PROCEDURE — 99214 OFFICE O/P EST MOD 30 MIN: CPT

## 2024-08-27 NOTE — PROGRESS NOTES
Established Patient Progress Note      Chief Complaint   Patient presents with    Abnormal Endocrine Labs        Impression & Plan:    Problem List Items Addressed This Visit          Endocrine    Thyroid nodule     S/p fine-needle aspiration biopsy-right-sided thyroid nodule in -thyroid nodule has remained stable on serial imaging-continue to monitor             Other    Low TSH level - Primary     TSH has remained stable. fT4 has been low. We are monitoring serial fT4 by dialysis. Total T4 and T4 by dialysis have been in normal range for pregnancy. Pregnancy is going well without any complications.   Will plan to repeat T4 by dialysis before and after delivery.          Relevant Orders    Free T4 by Dialysis/Mass Spec    TSH, 3rd generation       History of Present Illness:   Lorraine Klein is a 34 y.o. female with low TSH which began during her current pregnancy. She is currently 33 weeks pregnant. Pregnancy is progressing well without any complications.     TSH and fT4 has been low. Total T4 as well as T4 by dialysis has been in target range for pregnancy. She denies any symptoms of hypo/hyperthyroidism. She does have some trouble sleeping due to pregnancy.     She has history of thyroid nodules and underwent fine-needle aspiration biopsy of the right-sided thyroid nodule in  which was negative for malignancy-thyroid nodule has remained stable on serial imaging  This is her second pregnancy  - 2nd IVF   Required IVF due to male factor       Patient Active Problem List   Diagnosis    History of female infertility    Nephrolithiasis    Ureteric colic    Thyroid nodule    PTSD (post-traumatic stress disorder)    Ureterolithiasis    17 weeks gestation of pregnancy    Low TSH level    Multigravida of advanced maternal age in third trimester    History of  delivery, antepartum    Family history of cardiac disorder in father    Pregnancy resulting from assisted reproductive technology in second trimester     Multiple thyroid nodules    Pharyngitis    Pregnancy resulting from in vitro fertilization in third trimester      Past Medical History:   Diagnosis Date    Allergic     Anxiety     Kidney stone     Nephrolithiasis 07/10/2018      Past Surgical History:   Procedure Laterality Date     SECTION  4/3/2015    [place] CYSTO/URETERO W/LITHOTRIPSY &INDWELL STENT INSRT Right 2018    Procedure: CYSTOSCOPY URETEROSCOPY; RETROGRADE PYELOGRAM AND INSERTION STENT URETERAL;  Surgeon: Gerry Guillen MD;  Location: [facility];  Service: Urology    US GUIDED THYROID BIOPSY  2021      Family History   Problem Relation Age of Onset    No Known Problems Mother     Nephrolithiasis Father     Nephrolithiasis Sister     Nephrolithiasis Paternal Aunt     Nephrolithiasis Paternal Uncle     Diabetes Maternal Grandmother     Prostate cancer Maternal Grandfather     Diabetes Paternal Grandmother     Diabetes unspecified Paternal Grandmother     Thyroid disease unspecified Paternal Grandfather     Diabetes Paternal Grandfather      Social History     Tobacco Use    Smoking status: Never    Smokeless tobacco: Never   Substance Use Topics    Alcohol use: Not Currently     Comment: socially     Allergies   Allergen Reactions    Bee Pollen Other (See Comments)    Pollen Extract          Current Outpatient Medications:     aspirin 81 mg chewable tablet, Chew 162 mg daily, Disp: , Rfl:     cetirizine (ZyrTEC) 5 MG chewable tablet, Chew 5 mg daily, Disp: , Rfl:     escitalopram (LEXAPRO) 10 mg tablet, TAKE 1 TABLET DAILY FOR ANXIETY, Disp: 90 tablet, Rfl: 3    MAGNESIUM GLYCINATE PO, Take 360 mg by mouth daily, Disp: , Rfl:     Prenatal MV-Min-Fe Fum-FA-DHA (PRENATAL 1 PO), Take by mouth, Disp: , Rfl:     albuterol (Ventolin HFA) 90 mcg/act inhaler, Inhale 2 puffs every 6 (six) hours as needed for wheezing (Patient not taking: Reported on 2024), Disp: 18 g, Rfl: 5    famotidine (PEPCID) 20 mg tablet, Take 20 mg by mouth 2  "(two) times a day (Patient not taking: Reported on 8/27/2024), Disp: , Rfl:     fluticasone (FLONASE) 50 mcg/act nasal spray, 1 spray into each nostril daily occasionally (Patient not taking: Reported on 8/22/2024), Disp: , Rfl:     magnesium hydroxide (MILK OF MAGNESIA) 400 mg/5 mL oral suspension, Take by mouth daily as needed for constipation (Patient not taking: Reported on 8/27/2024), Disp: , Rfl:     Review of Systems   Constitutional:  Negative for chills and fever.   HENT:  Negative for sore throat, trouble swallowing and voice change.    Cardiovascular:  Negative for chest pain and palpitations.   Gastrointestinal:  Negative for constipation and diarrhea.   Endocrine: Negative for cold intolerance and heat intolerance.   Psychiatric/Behavioral:  Positive for sleep disturbance. The patient is not nervous/anxious.    All other systems reviewed and are negative.      Physical Exam:  Body mass index is 32.84 kg/m².  Ht 5' 9\" (1.753 m)   Wt 101 kg (222 lb 6.4 oz)   LMP 01/08/2024 (Within Weeks)   BMI 32.84 kg/m²    Wt Readings from Last 3 Encounters:   08/27/24 101 kg (222 lb 6.4 oz)   08/22/24 100 kg (220 lb 12.8 oz)   07/09/24 97.8 kg (215 lb 9.6 oz)       Physical Exam  Vitals reviewed.   Constitutional:       Appearance: Normal appearance.   Neck:      Thyroid: Thyroid mass (palpable thyroid nodule) present.   Cardiovascular:      Rate and Rhythm: Normal rate.   Pulmonary:      Effort: Pulmonary effort is normal.   Neurological:      Mental Status: She is alert and oriented to person, place, and time.   Psychiatric:         Mood and Affect: Mood normal.         Thought Content: Thought content normal.         Labs:   Lab Results   Component Value Date    HGBA1C 5.3 10/08/2019     Lab Results   Component Value Date    CREATININE 0.58 (L) 03/22/2024    CREATININE 0.73 04/12/2023    CREATININE 0.74 01/23/2023    BUN 11 03/22/2024    K 3.9 03/22/2024     03/22/2024    CO2 27 03/22/2024     GFR, " "Calculated   Date Value Ref Range Status   10/08/2019 105 >60 mL/min/1.73m2 Final     Comment:     mL/min per 1.73 square meters                                            Normal Function or Mild Renal    Disease (if clinically at risk):  >or=60  Moderately Decreased:                30-59  Severely Decreased:                  15-29  Renal Failure:                         <15                                            -American GFR: multiply reported GFR by 1.16    Please note that the eGFR is based on the CKD-EPI calculation, and is not intended to be used for drug dosing.                                            Note: Calculated GFR may not be an accurate indicator of renal function if the patient's renal function is not in a steady state.     eGFRcr   Date Value Ref Range Status   04/12/2023 111 >59 Final     eGFR   Date Value Ref Range Status   03/22/2024 119 ml/min/1.73sq m Final     No results found for: \"CHOL\", \"HDL\", \"LDL\", \"TRIG\", \"CHOLHDL\"  Lab Results   Component Value Date    ALT 16 04/12/2023    AST 17 04/12/2023    ALKPHOS 59 04/12/2023     No results found for: \"HGC6JAWMEXID\"  Lab Results   Component Value Date    FREET4 0.47 (L) 08/13/2024       Orders Placed This Encounter   Procedures    Free T4 by Dialysis/Mass Spec     Standing Status:   Future     Standing Expiration Date:   8/27/2025    TSH, 3rd generation     This is a patient instruction: This test is non-fasting. Please drink two glasses of water morning of bloodwork.        Standing Status:   Future     Number of Occurrences:   1     Standing Expiration Date:   8/27/2025       There are no Patient Instructions on file for this visit.    Discussed with the patient and all questioned fully answered. She will call me if any problems arise.    LIGIA Torres  "

## 2024-08-27 NOTE — ASSESSMENT & PLAN NOTE
TSH has remained stable. fT4 has been low. We are monitoring serial fT4 by dialysis. Total T4 and T4 by dialysis have been in normal range for pregnancy. Pregnancy is going well without any complications.   Will plan to repeat T4 by dialysis before and after delivery.

## 2024-09-09 ENCOUNTER — CLINICAL SUPPORT (OUTPATIENT)
Age: 34
End: 2024-09-09

## 2024-09-09 DIAGNOSIS — Z32.2 ENCOUNTER FOR CHILDBIRTH INSTRUCTION: Primary | ICD-10-CM

## 2024-09-12 DIAGNOSIS — F43.10 PTSD (POST-TRAUMATIC STRESS DISORDER): ICD-10-CM

## 2024-09-12 RX ORDER — ESCITALOPRAM OXALATE 10 MG/1
TABLET ORAL
Qty: 90 TABLET | Refills: 1 | Status: SHIPPED | OUTPATIENT
Start: 2024-09-12

## 2024-09-17 ENCOUNTER — LAB REQUISITION (OUTPATIENT)
Dept: LAB | Facility: HOSPITAL | Age: 34
End: 2024-09-17
Payer: COMMERCIAL

## 2024-09-17 DIAGNOSIS — Z36.85 ENCOUNTER FOR ANTENATAL SCREENING FOR STREPTOCOCCUS B: ICD-10-CM

## 2024-09-17 PROCEDURE — 87150 DNA/RNA AMPLIFIED PROBE: CPT | Performed by: OBSTETRICS & GYNECOLOGY

## 2024-09-19 LAB
GP B STREP DNA SPEC QL NAA+PROBE: NEGATIVE
TSH SERPL-ACNC: 0.95 UIU/ML (ref 0.45–5.33)

## 2024-09-23 LAB — T4 FREE SERPL DIALY-MCNC: 1.1 NG/DL

## 2024-09-24 DIAGNOSIS — R79.89 LOW TSH LEVEL: Primary | ICD-10-CM

## 2024-10-07 ENCOUNTER — ANESTHESIA EVENT (INPATIENT)
Dept: LABOR AND DELIVERY | Facility: HOSPITAL | Age: 34
End: 2024-10-07
Payer: COMMERCIAL

## 2024-10-08 ENCOUNTER — HOSPITAL ENCOUNTER (INPATIENT)
Facility: HOSPITAL | Age: 34
LOS: 2 days | Discharge: HOME | End: 2024-10-10
Attending: OBSTETRICS & GYNECOLOGY | Admitting: OBSTETRICS & GYNECOLOGY
Payer: COMMERCIAL

## 2024-10-08 ENCOUNTER — ANESTHESIA (INPATIENT)
Dept: LABOR AND DELIVERY | Facility: HOSPITAL | Age: 34
End: 2024-10-08
Payer: COMMERCIAL

## 2024-10-08 DIAGNOSIS — Z98.891 STATUS POST REPEAT LOW TRANSVERSE CESAREAN SECTION: Primary | ICD-10-CM

## 2024-10-08 PROBLEM — Z3A.39 39 WEEKS GESTATION OF PREGNANCY: Status: ACTIVE | Noted: 2024-05-08

## 2024-10-08 LAB
ABO GROUP BLD: NORMAL
BASE EXCESS BLDCOV CALC-SCNC: -3.6 MMOL/L (ref 1–9)
BLD GP AB SCN SERPL QL: NEGATIVE
ERYTHROCYTE [DISTWIDTH] IN BLOOD BY AUTOMATED COUNT: 15.8 % (ref 11.6–15.1)
HCO3 BLDCOV-SCNC: 22.6 MMOL/L (ref 12.2–28.6)
HCT VFR BLD AUTO: 33.5 % (ref 34.8–46.1)
HGB BLD-MCNC: 10.6 G/DL (ref 11.5–15.4)
HOLD SPECIMEN: YES
MCH RBC QN AUTO: 25.1 PG (ref 26.8–34.3)
MCHC RBC AUTO-ENTMCNC: 31.6 G/DL (ref 31.4–37.4)
MCV RBC AUTO: 79 FL (ref 82–98)
OXYHGB MFR BLDCOV: 59.3 %
PCO2 BLDCOV: 45.1 MM HG (ref 27–43)
PH BLDCOV: 7.32 [PH] (ref 7.19–7.49)
PLATELET # BLD AUTO: 210 THOUSANDS/UL (ref 149–390)
PMV BLD AUTO: 12.8 FL (ref 8.9–12.7)
PO2 BLDCOV: 26.9 MM HG (ref 15–45)
RBC # BLD AUTO: 4.22 MILLION/UL (ref 3.81–5.12)
RH BLD: POSITIVE
SAO2 % BLDCOV: 13.2 ML/DL
SPECIMEN EXPIRATION DATE: NORMAL
TREPONEMA PALLIDUM IGG+IGM AB [PRESENCE] IN SERUM OR PLASMA BY IMMUNOASSAY: NORMAL
WBC # BLD AUTO: 8.42 THOUSAND/UL (ref 4.31–10.16)

## 2024-10-08 PROCEDURE — 86850 RBC ANTIBODY SCREEN: CPT

## 2024-10-08 PROCEDURE — 85027 COMPLETE CBC AUTOMATED: CPT

## 2024-10-08 PROCEDURE — 86780 TREPONEMA PALLIDUM: CPT

## 2024-10-08 PROCEDURE — 86900 BLOOD TYPING SEROLOGIC ABO: CPT

## 2024-10-08 PROCEDURE — 86901 BLOOD TYPING SEROLOGIC RH(D): CPT

## 2024-10-08 PROCEDURE — 82805 BLOOD GASES W/O2 SATURATION: CPT | Performed by: OBSTETRICS & GYNECOLOGY

## 2024-10-08 PROCEDURE — NC001 PR NO CHARGE: Performed by: OBSTETRICS & GYNECOLOGY

## 2024-10-08 RX ORDER — PHENYLEPHRINE HCL IN 0.9% NACL 1 MG/10 ML
SYRINGE (ML) INTRAVENOUS
Status: DISPENSED
Start: 2024-10-08 | End: 2024-10-08

## 2024-10-08 RX ORDER — ACETAMINOPHEN 325 MG/1
650 TABLET ORAL EVERY 6 HOURS SCHEDULED
Status: DISCONTINUED | OUTPATIENT
Start: 2024-10-08 | End: 2024-10-09

## 2024-10-08 RX ORDER — BENZOCAINE/MENTHOL 6 MG-10 MG
1 LOZENGE MUCOUS MEMBRANE DAILY PRN
Status: DISCONTINUED | OUTPATIENT
Start: 2024-10-08 | End: 2024-10-10 | Stop reason: HOSPADM

## 2024-10-08 RX ORDER — ONDANSETRON 2 MG/ML
INJECTION INTRAMUSCULAR; INTRAVENOUS AS NEEDED
Status: DISCONTINUED | OUTPATIENT
Start: 2024-10-08 | End: 2024-10-08

## 2024-10-08 RX ORDER — CEFAZOLIN SODIUM 2 G/50ML
2000 SOLUTION INTRAVENOUS ONCE
Status: DISCONTINUED | OUTPATIENT
Start: 2024-10-08 | End: 2024-10-08

## 2024-10-08 RX ORDER — SODIUM CHLORIDE, SODIUM LACTATE, POTASSIUM CHLORIDE, CALCIUM CHLORIDE 600; 310; 30; 20 MG/100ML; MG/100ML; MG/100ML; MG/100ML
125 INJECTION, SOLUTION INTRAVENOUS CONTINUOUS
Status: DISCONTINUED | OUTPATIENT
Start: 2024-10-08 | End: 2024-10-10 | Stop reason: HOSPADM

## 2024-10-08 RX ORDER — EPHEDRINE SULFATE 50 MG/ML
INJECTION INTRAVENOUS
Status: COMPLETED
Start: 2024-10-08 | End: 2024-10-08

## 2024-10-08 RX ORDER — FENTANYL CITRATE 50 UG/ML
INJECTION, SOLUTION INTRAMUSCULAR; INTRAVENOUS AS NEEDED
Status: DISCONTINUED | OUTPATIENT
Start: 2024-10-08 | End: 2024-10-08

## 2024-10-08 RX ORDER — OXYTOCIN/RINGER'S LACTATE 30/500 ML
62.5 PLASTIC BAG, INJECTION (ML) INTRAVENOUS ONCE
Status: DISCONTINUED | OUTPATIENT
Start: 2024-10-08 | End: 2024-10-10 | Stop reason: HOSPADM

## 2024-10-08 RX ORDER — ENOXAPARIN SODIUM 100 MG/ML
40 INJECTION SUBCUTANEOUS DAILY
Status: DISCONTINUED | OUTPATIENT
Start: 2024-10-09 | End: 2024-10-10 | Stop reason: HOSPADM

## 2024-10-08 RX ORDER — ONDANSETRON 2 MG/ML
4 INJECTION INTRAMUSCULAR; INTRAVENOUS EVERY 8 HOURS PRN
Status: DISCONTINUED | OUTPATIENT
Start: 2024-10-08 | End: 2024-10-08

## 2024-10-08 RX ORDER — NALOXONE HYDROCHLORIDE 0.4 MG/ML
0.1 INJECTION, SOLUTION INTRAMUSCULAR; INTRAVENOUS; SUBCUTANEOUS
Status: ACTIVE | OUTPATIENT
Start: 2024-10-08 | End: 2024-10-09

## 2024-10-08 RX ORDER — OXYCODONE HYDROCHLORIDE 5 MG/1
10 TABLET ORAL EVERY 4 HOURS PRN
Status: DISCONTINUED | OUTPATIENT
Start: 2024-10-09 | End: 2024-10-10 | Stop reason: HOSPADM

## 2024-10-08 RX ORDER — BUPIVACAINE HYDROCHLORIDE 7.5 MG/ML
INJECTION, SOLUTION INTRASPINAL AS NEEDED
Status: DISCONTINUED | OUTPATIENT
Start: 2024-10-08 | End: 2024-10-08

## 2024-10-08 RX ORDER — FAMOTIDINE 20 MG/1
20 TABLET, FILM COATED ORAL 2 TIMES DAILY
Status: DISCONTINUED | OUTPATIENT
Start: 2024-10-08 | End: 2024-10-10 | Stop reason: HOSPADM

## 2024-10-08 RX ORDER — FENTANYL CITRATE/PF 50 MCG/ML
25 SYRINGE (ML) INJECTION
Status: DISCONTINUED | OUTPATIENT
Start: 2024-10-08 | End: 2024-10-08 | Stop reason: SDUPTHER

## 2024-10-08 RX ORDER — CEFAZOLIN SODIUM 2 G/50ML
SOLUTION INTRAVENOUS AS NEEDED
Status: DISCONTINUED | OUTPATIENT
Start: 2024-10-08 | End: 2024-10-08

## 2024-10-08 RX ORDER — EPHEDRINE SULFATE 50 MG/ML
INJECTION INTRAVENOUS AS NEEDED
Status: DISCONTINUED | OUTPATIENT
Start: 2024-10-08 | End: 2024-10-08

## 2024-10-08 RX ORDER — ONDANSETRON 2 MG/ML
4 INJECTION INTRAMUSCULAR; INTRAVENOUS ONCE AS NEEDED
Status: DISCONTINUED | OUTPATIENT
Start: 2024-10-08 | End: 2024-10-08 | Stop reason: SDUPTHER

## 2024-10-08 RX ORDER — OXYCODONE HYDROCHLORIDE 5 MG/1
5 TABLET ORAL EVERY 4 HOURS PRN
Status: DISCONTINUED | OUTPATIENT
Start: 2024-10-08 | End: 2024-10-10 | Stop reason: HOSPADM

## 2024-10-08 RX ORDER — OXYTOCIN/RINGER'S LACTATE 30/500 ML
PLASTIC BAG, INJECTION (ML) INTRAVENOUS CONTINUOUS PRN
Status: DISCONTINUED | OUTPATIENT
Start: 2024-10-08 | End: 2024-10-08

## 2024-10-08 RX ORDER — ESCITALOPRAM OXALATE 10 MG/1
10 TABLET ORAL DAILY
Status: DISCONTINUED | OUTPATIENT
Start: 2024-10-09 | End: 2024-10-09

## 2024-10-08 RX ORDER — MORPHINE SULFATE 0.5 MG/ML
INJECTION, SOLUTION EPIDURAL; INTRATHECAL; INTRAVENOUS AS NEEDED
Status: DISCONTINUED | OUTPATIENT
Start: 2024-10-08 | End: 2024-10-08

## 2024-10-08 RX ORDER — ONDANSETRON 2 MG/ML
4 INJECTION INTRAMUSCULAR; INTRAVENOUS EVERY 8 HOURS PRN
Status: DISCONTINUED | OUTPATIENT
Start: 2024-10-08 | End: 2024-10-10 | Stop reason: HOSPADM

## 2024-10-08 RX ORDER — FENTANYL CITRATE 50 UG/ML
INJECTION, SOLUTION INTRAMUSCULAR; INTRAVENOUS
Status: COMPLETED
Start: 2024-10-08 | End: 2024-10-08

## 2024-10-08 RX ORDER — KETOROLAC TROMETHAMINE 30 MG/ML
30 INJECTION, SOLUTION INTRAMUSCULAR; INTRAVENOUS EVERY 6 HOURS PRN
Status: DISPENSED | OUTPATIENT
Start: 2024-10-08 | End: 2024-10-09

## 2024-10-08 RX ORDER — MORPHINE SULFATE 0.5 MG/ML
INJECTION, SOLUTION EPIDURAL; INTRATHECAL; INTRAVENOUS
Status: COMPLETED
Start: 2024-10-08 | End: 2024-10-08

## 2024-10-08 RX ORDER — OXYTOCIN/RINGER'S LACTATE 30/500 ML
PLASTIC BAG, INJECTION (ML) INTRAVENOUS
Status: COMPLETED
Start: 2024-10-08 | End: 2024-10-08

## 2024-10-08 RX ADMIN — EPHEDRINE SULFATE 10 MG: 50 INJECTION INTRAVENOUS at 08:15

## 2024-10-08 RX ADMIN — Medication 250 MILLI-UNITS/MIN: at 08:32

## 2024-10-08 RX ADMIN — KETOROLAC TROMETHAMINE 30 MG: 30 INJECTION, SOLUTION INTRAMUSCULAR; INTRAVENOUS at 13:56

## 2024-10-08 RX ADMIN — SODIUM CHLORIDE, SODIUM LACTATE, POTASSIUM CHLORIDE, AND CALCIUM CHLORIDE 1000 ML: .6; .31; .03; .02 INJECTION, SOLUTION INTRAVENOUS at 06:22

## 2024-10-08 RX ADMIN — KETOROLAC TROMETHAMINE 30 MG: 30 INJECTION, SOLUTION INTRAMUSCULAR; INTRAVENOUS at 20:13

## 2024-10-08 RX ADMIN — ACETAMINOPHEN 650 MG: 325 TABLET, FILM COATED ORAL at 12:12

## 2024-10-08 RX ADMIN — SODIUM CHLORIDE, SODIUM LACTATE, POTASSIUM CHLORIDE, AND CALCIUM CHLORIDE: .6; .31; .03; .02 INJECTION, SOLUTION INTRAVENOUS at 08:18

## 2024-10-08 RX ADMIN — SODIUM CHLORIDE, SODIUM LACTATE, POTASSIUM CHLORIDE, AND CALCIUM CHLORIDE: .6; .31; .03; .02 INJECTION, SOLUTION INTRAVENOUS at 08:54

## 2024-10-08 RX ADMIN — ONDANSETRON 4 MG: 2 INJECTION INTRAMUSCULAR; INTRAVENOUS at 08:56

## 2024-10-08 RX ADMIN — BUPIVACAINE HYDROCHLORIDE IN DEXTROSE 2 ML: 7.5 INJECTION, SOLUTION SUBARACHNOID at 08:07

## 2024-10-08 RX ADMIN — MORPHINE SULFATE 0.15 MG: 0.5 INJECTION, SOLUTION EPIDURAL; INTRATHECAL; INTRAVENOUS at 08:07

## 2024-10-08 RX ADMIN — CEFAZOLIN SODIUM 2000 MG: 2 SOLUTION INTRAVENOUS at 08:12

## 2024-10-08 RX ADMIN — EPHEDRINE SULFATE 5 MG: 50 INJECTION INTRAVENOUS at 08:21

## 2024-10-08 RX ADMIN — ACETAMINOPHEN 650 MG: 325 TABLET, FILM COATED ORAL at 17:52

## 2024-10-08 RX ADMIN — FENTANYL CITRATE 15 MCG: 50 INJECTION INTRAMUSCULAR; INTRAVENOUS at 08:07

## 2024-10-08 RX ADMIN — SODIUM CHLORIDE, SODIUM LACTATE, POTASSIUM CHLORIDE, AND CALCIUM CHLORIDE 125 ML/HR: .6; .31; .03; .02 INJECTION, SOLUTION INTRAVENOUS at 06:30

## 2024-10-08 RX ADMIN — PHENYLEPHRINE HYDROCHLORIDE 10 MCG/MIN: 50 INJECTION INTRAVENOUS at 08:13

## 2024-10-08 RX ADMIN — EPHEDRINE SULFATE 5 MG: 50 INJECTION INTRAVENOUS at 08:17

## 2024-10-08 RX ADMIN — SODIUM CHLORIDE, SODIUM LACTATE, POTASSIUM CHLORIDE, AND CALCIUM CHLORIDE 125 ML/HR: .6; .31; .03; .02 INJECTION, SOLUTION INTRAVENOUS at 07:25

## 2024-10-08 NOTE — OP NOTE
OPERATIVE REPORT  PATIENT NAME: Lorraine Klein    :  1990  MRN: [id number]  Pt Location: [facility] OR RO[facility] 01    SURGERY DATE: 10/8/2024    Surgeons and Role:     * Eneida Ponce DO - Primary     * Jazmín Blanc MD - Assisting    Preop Diagnosis:  IUP @ 39 weeks  History of CD  Pregnancy resulting from IVF  PTSD      Procedure(s) (LRB):   SECTION () REPEAT (N/A)    Specimen(s):  ID Type Source Tests Collected by Time Destination   A :  Tissue (Placenta on Hold) OB Only Placenta PLACENTA IN STORAGE Eneida Ponce, DO 10/8/2024 07    C :  Cord Blood Cord BLOOD GAS, VENOUS, CORD Eneida Ponce, DO 10/8/2024 0731    D :  Cord Blood Cord BLOOD GAS, ARTERIAL, CORD (Canceled) Eneida Rosario, DO 10/8/2024 0731        Surgical QBL:  Surgical QBL (mL): 723 mL      Drains:  Urethral Catheter 16 Fr. (Active)   Number of days: 0       Anesthesia Type:   Spinal    Operative Indications:  IUP @ 39 weeks gestation   Previous CD  PTSD     Elijah Group Classification System:  No Multiple pregnancy, No Transverse or oblique lie, No Breech lie, Gestational age is > or =37 weeks, Multiparous, Previous uterine scar +  is ELIJAH GROUP 5    Brief History  Lorraine Klein is a 34 y.o.  at 39w2d admitted for scheduled RLTCS.     Operative Indications:  Scheduled repeat low transverse  section      Attending Surgeon: Dr. Ponce  Resident Surgeon: Dr. Blanc    Operative Findings:  1. Viable female  on 10/8/2024 at 8:31 AM with APGARs of 8  and 9  at 1 and 5 minutes. Fetus weighed 3490 g (7 lb 11.1 oz); 123.1   2. Clear amniotic fluid  3. Normal intact placenta with centrally inserted 3VC.  4. Normal uterus, bilateral tubes and ovaries  5. Adhesions absent    QBL: 723 cc    Umbilical Cord Venous Blood Gas:  Results from last 7 days   Lab Units 10/08/24  0731   PH COV  7.318   PCO2 COV mm HG 45.1*   HCO3 COV mmol/L 22.6   BASE EXC COV mmol/L -3.6*   O2 CT CD VB mL/dL 13.2    O2 HGB, VENOUS CORD % 59.3     Umbilical Cord Arterial Blood Gas:  Insufficient sample     Operative Technique  The patient was taken to the operating room. Spinal anesthesia was adequately established and Ancef 2g was given for preoperative prophylaxis. The patient was then placed in the dorsal supine position with a left tilt of the hips. The patient was then prepped with chlorhexidine for vaginal prep and chloraprep for abdominal prep and draped in the usual sterile fashion for a Pfannenstiel skin incision.      A time out was performed to confirm correct patient and correct procedure.     A Pfannenstiel incision was made in the skin with a surgical scalpel and sharp dissection was carried out over subsequent layers of tissue including the fascia, followed by the Bovie electrocautery for hemostasis. The fascia was incised and the incision was extended bilaterally using the curved Craven scissors.      The superior edge of the fascial incision was grasped with Kocher clamps, tented up and the underlying rectus muscles were dissected off bluntly and sharply using the scalpel. The same was done inferiorly. The rectus muscles were then divided at midline and the peritoneum was identified, tented up at its upper margin taking care to avoid the bladder, and then entered. The peritoneal incision was extended superiorly and inferiorly.     The Charlie-O retractor was inserted and a transverse incision was made in the lower uterine segment using a new surgical blade. The uterine incision was extended cephalad and caudal using blunt dissection. The amniotic sac was entered.    The surgeon's hand was placed into the uterine cavity. The fetal head was identified and elevated through the uterine incision with the assistance of fundal pressure. With gentle traction, the shoulder was delivered, followed by the rest of the fetal body. There was no nuchal cord noted. On delivery the cord was doubly clamped and cut after delayed  cord clamping. The infant was then passed off the table to the awaiting  staff. The  was noted to cry spontaneously and moved all extremities. Venous and arterial blood gas, cord blood, and portion of cord was obtained for analysis and routine blood testing. The placenta delivery was then sent to storage. Placenta was noted to be intact with a centrally inserted three-vessel cord.  Oxytocin was administered by IV infusion to enhance uterine contraction. The uterus was cleared of all clots and remaining products of conception.      The uterine incision was re approximated using an 0 Vicryl in a running locked fashion. A second horizontal imbricating stitch with the same suture was applied. The uterine incision was examined and noted to be hemostatic. The pericolic gutters were cleared of all clots.  The uterine incision was once again reexamined and noted to be hemostatic. The fascia was re approximated using an 0 Vicryl in a running nonlocked fashion. The subcutaneous tissue was irrigated and cleared of all clots and debris. Good hemostasis was noted with Bovie electrocautery. The subcutaneous tissue was re-approximated. The skin incision was closed using 4-0 Vicryl with exofin. Good hemostasis was noted. Patient tolerated the procedure well. All needle, sponge, and instrument counts were noted to be correct x 2 at the end of the procedure.  Patient was transferred to the recovery room in stable condition. Dr. Ponce was present and participated in the entire surgery.      Complications:   None apparent     Patient Disposition:  PACU         SIGNATURE: Jazmín Blanc MD  DATE: 2024  TIME: 9:34 AM

## 2024-10-08 NOTE — ANESTHESIA PREPROCEDURE EVALUATION
Procedure:   SECTION () REPEAT (Uterus)    Relevant Problems   /RENAL   (+) Nephrolithiasis      GYN   (+) 17 weeks gestation of pregnancy   (+) Multigravida of advanced maternal age in third trimester   (+) Pregnancy resulting from assisted reproductive technology in second trimester      NEURO/PSYCH   (+) PTSD (post-traumatic stress disorder)        Physical Exam    Airway    Mallampati score: II         Dental       Cardiovascular  Rhythm: regular, Rate: normal    Pulmonary   Breath sounds clear to auscultation    Other Findings  post-pubertal.      Anesthesia Plan  ASA Score- 2     Anesthesia Type- spinal with ASA Monitors.         Additional Monitors:     Airway Plan:            Plan Factors-Exercise tolerance (METS): >4 METS.    Chart reviewed.   Existing labs reviewed. Patient summary reviewed.    Patient is not a current smoker.              Induction- intravenous.    Postoperative Plan-     Perioperative Resuscitation Plan - Level 1 - Full Code.       Informed Consent- Anesthetic plan and risks discussed with patient.

## 2024-10-08 NOTE — PROGRESS NOTES
Post Op Check - OB/GYN  oLrraine Klein 34 y.o. female MRN: [id number]  Unit/Bed#: -01 Encounter: [id number]    Chief complaint: POD#0 s/p RLTCS    SUBJECTIVE:    Pain: well controlled  Tolerating PO: yes  Voiding: UPO adequate with villalpando in place  Flatus: not yet  BM: denies  Ambulating: not yet  Breastfeeding:  ye  Chest pain: denies  Shortness of breath: denies  Leg pain: denies  Lochia: normal     OBJECTIVE:  Vitals:   /83   Pulse 88   Temp 98.4 °F (36.9 °C) (Temporal)   Resp 17   LMP 2024 (Within Weeks)   SpO2 98%   Breastfeeding Yes       Intake/Output Summary (Last 24 hours) at 10/8/2024 1628  Last data filed at 10/8/2024 1301  Gross per 24 hour   Intake 2050 ml   Output 2523 ml   Net -473 ml       Invasive Devices       Peripheral Intravenous Line  Duration             Peripheral IV 10/08/24 Dorsal (posterior);Left Forearm <1 day              Drain  Duration             Urethral Catheter 16 Fr. <1 day                    Physical Exam:   GEN: Lorraine Klein appears well, alert and oriented x 3, pleasant and cooperative   PULMONARY: CTAB  CARDIAC: RRR  ABDOMEN: soft, no tenderness, no distention, fundus firm 2 below umbilicus , Incision C/D/I  EXTREMITIES: SCDs on and pumping      Labs:   Admission on 10/08/2024   Component Date Value    ABO Grouping 10/08/2024 O     Rh Factor 10/08/2024 Positive     Antibody Screen 10/08/2024 Negative     Specimen Expiration Date 10/08/2024 [id number]     WBC 10/08/2024 8.42     RBC 10/08/2024 4.22     Hemoglobin 10/08/2024 10.6 (L)     Hematocrit 10/08/2024 33.5 (L)     MCV 10/08/2024 79 (L)     MCH 10/08/2024 25.1 (L)     MCHC 10/08/2024 31.6     RDW 10/08/2024 15.8 (H)     Platelets 10/08/2024 210     MPV 10/08/2024 12.8 (H)     Extra Tube 10/08/2024 Yes     Syphilis Total Antibody 10/08/2024 Non-reactive     Syphilis Total Antibody 2024 non reactive     Syphilis Total Antibody 2024 non reactive     Hepatitis B Surface Ag 2024  non reactive     External Rubella IGG Carl* 03/13/2024 immune     pH, Cord Clem 10/08/2024 7.318     pCO2, Cord Clem 10/08/2024 45.1 (H)     pO2, Cord Clem 10/08/2024 26.9     HCO3, Cord Clem 10/08/2024 22.6     Base Exc, Cord Clem 10/08/2024 -3.6 (L)     O2 Cont, Cord Clem 10/08/2024 13.2     O2 HGB,VENOUS CORD 10/08/2024 59.3          ASSESSMENT:  Postop Day #0 s/p RLTCS, stable.    PLAN:  Postoperative care   Hgb 10.6 g/dL --> FU am CBC   Urinary output adequate, villalpando in place   Advance diet as tolerated   Encourage ambulation   Encourage breastfeeding   Anticipate discharge     Jazmín Blanc MD  OBGYN, PGY-2  10/8/2024, 4:28 PM

## 2024-10-08 NOTE — DISCHARGE SUMMARY
Obstetrics Discharge Summary  Lorraine Klein 34 y.o. female MRN: [id number]  Unit/Bed#: -01 Encounter: [id number]    Admission Date: 10/8/2024     Discharge Date: 10/10/24    Admitting Attending: Dr. Ponce  Delivery Attending: Dr. Ponce  Discharging Attending:  Dr. Palacio    Admitting Diagnoses:   39 weeks gestation of pregnancy  History of  delivery  IVF pregnancy  AMA    Discharge Diagnoses:   Same, delivered  Gestational hypertension    Procedures: RLTCS    Anesthesia: spinal    Hospital course: Lorraine Klein is now a 34 y.o.  who was admitted for scheduled repeat low transverse  section.     Delivery Findings:  She underwent an uncomplicated repeat low transverse  section delivery on 10/8/2024 and delivered a viable female  at 0831. Placenta delivered without difficulty.   was admitted to the  nursery. Patient tolerated the procedure well and was transferred to recovery in stable condition.     Baby's Weight: 3490 g (7 lb 11.1 oz); 123.1    Apgar scores: 8  and 9  at 1 and 5 minutes, respectively  QBL: 723mL    The patient's post partum course was notable for meeting criteria for gestational hypertension not requiring medication. Her preoperative hemoglobin was 10.6g/dL, postoperative was 8.3 and she received venofer. Her postoperative pain was well controlled with oral analgesics.    Her postpartum pain was well controlled with oral analgesics. Maternal blood type is O positive so RhoGAM was not indicated.    On day of discharge, she was ambulating and able to reasonably perform all ADLs. She was voiding and had appropriate bowel function. Pain was well controlled. She was discharged home on postpartum day #2 without complications. Patient was instructed to follow up with her OBGYN as an outpatient and was given appropriate warnings to call provider if she develops signs of infection or uncontrolled pain.    Complications: none  apparent    Condition at discharge: Good    Disposition: Home    Planned Readmission: No    Discharge Medications:   Please see AVS for a complete list of discharge medications.    Discharge instructions :   -Do not place anything (no partner, tampons or douche) in your vagina for 6 weeks  -You may walk for exercise for the first 6 weeks then gradually return to your usual activities   -Please do not drive for 1 week if you have no stitches and for 2 weeks if you have stitches    -You may take baths or shower per your preference   -Please examine your breasts in the mirror daily and call your doctor for redness or tenderness or increased warmth    -Please call your doctor's office if temperature > 100.4*F or 38* C, worsening pain or a foul discharge.    Follow Up:  - Follow up in 3 weeks for postpartum visit or sooner if needed    Elidia Borja MD  OBGYN, PGY-2  10/10/24  6:58 AM

## 2024-10-08 NOTE — H&P
H & P- Obstetrics   Lorraine Klein 34 y.o. female MRN: [id number]  Unit/Bed#: LD TRIAGE 3- Encounter: [id number]    Assessment: 34 y.o.  at 39w2d admitted for scheduled RLTCS.  SVE: deferred  FHT: reactive  Clinical EFW: 7 lb by osmany  GBS status: neg   Postpartum contraception plan: undecided     Plan:   Pregnancy resulting from assisted reproductive technology in second trimester  Assessment & Plan  IVF with [facility]    History of  delivery, antepartum  Assessment & Plan  Plan for repeat LTCS    * 39 weeks gestation of pregnancy  Assessment & Plan  Anesthesia consult for regional anesthesia  Ancef 2 g IV for surgical prophylaxis  FU CBC, Syphilis screening, Blood Type & Screen  Contraception: undecided            Discussed case and plan w/ Dr. Parikh      Chief Complaint: I'm here for my C/S    HPI: Lorraine Klein is a 34 y.o.  with an NENA of 10/13/2024, by Embryo Transfer at 39w2d who is being admitted for scheduled RLTCS. She denies having uterine contractions, has no LOF, and reports no VB. She states she has felt good FM.    Patient Active Problem List   Diagnosis    History of female infertility    Nephrolithiasis    Ureteric colic    Thyroid nodule    PTSD (post-traumatic stress disorder)    Ureterolithiasis    39 weeks gestation of pregnancy    Low TSH level    Multigravida of advanced maternal age in third trimester    History of  delivery, antepartum    Family history of cardiac disorder in father    Pregnancy resulting from assisted reproductive technology in second trimester    Multiple thyroid nodules    Pharyngitis    Pregnancy resulting from in vitro fertilization in third trimester       Baby complications/comments: none    Review of Systems   Constitutional:  Negative for chills and fever.   HENT:  Negative for congestion, rhinorrhea, sneezing and sore throat.    Eyes:  Negative for photophobia and visual disturbance.   Respiratory:  Negative for cough and  shortness of breath.    Cardiovascular:  Negative for chest pain.   Gastrointestinal:  Negative for diarrhea, nausea and vomiting.   Genitourinary:  Negative for dysuria and frequency.   Neurological:  Negative for dizziness, numbness and headaches.   Psychiatric/Behavioral: Negative.         OB Hx:  OB History    Para Term  AB Living   2 1 1     1   SAB IAB Ectopic Multiple Live Births           1      # Outcome Date GA Lbr Armand/2nd Weight Sex Type Anes PTL Lv   2 Current            1 Term      CS-Unspec          Past Medical Hx:  Past Medical History:   Diagnosis Date    Allergic     Anxiety     Kidney stone     Nephrolithiasis 07/10/2018       Past Surgical hx:  Past Surgical History:   Procedure Laterality Date     SECTION  4/3/2015    MT CYSTO/URETERO W/LITHOTRIPSY &INDWELL STENT INSRT Right 2018    Procedure: CYSTOSCOPY URETEROSCOPY; RETROGRADE PYELOGRAM AND INSERTION STENT URETERAL;  Surgeon: Gerry Guillen MD;  [facility];  Service: Urology    US GUIDED THYROID BIOPSY  2021       Social Hx:  Alcohol use: denies  Tobacco use: denies  Other substance use: denies      Allergies   Allergen Reactions    Bee Pollen Other (See Comments)    Pollen Extract          Medications Prior to Admission:     cetirizine (ZyrTEC) 5 MG chewable tablet    escitalopram (LEXAPRO) 10 mg tablet    famotidine (PEPCID) 20 mg tablet    Prenatal MV-Min-Fe Fum-FA-DHA (PRENATAL 1 PO)    albuterol (Ventolin HFA) 90 mcg/act inhaler    aspirin 81 mg chewable tablet    fluticasone (FLONASE) 50 mcg/act nasal spray    MAGNESIUM GLYCINATE PO    magnesium hydroxide (MILK OF MAGNESIA) 400 mg/5 mL oral suspension    Objective:  HR:  [82] 82  BP: (116-129)/(79-82) 116/79  Resp:  [18] 18  There is no height or weight on file to calculate BMI.     Physical Exam:  Physical Exam  Constitutional:       General: She is not in acute distress.     Appearance: Normal appearance. She is not ill-appearing.    Genitourinary:      Genitourinary Comments: SVE deferred   HENT:      Head: Normocephalic and atraumatic.      Mouth/Throat:      Mouth: Mucous membranes are moist. No oral lesions.   Eyes:      General: No scleral icterus.     Extraocular Movements: Extraocular movements intact.   Cardiovascular:      Rate and Rhythm: Normal rate and regular rhythm.      Pulses: Normal pulses.   Pulmonary:      Effort: Pulmonary effort is normal. No respiratory distress.      Breath sounds: Normal breath sounds.   Abdominal:      Palpations: Abdomen is soft.      Tenderness: There is no abdominal tenderness.   Musculoskeletal:      Right lower leg: No edema.      Left lower leg: No edema.   Neurological:      General: No focal deficit present.      Mental Status: She is alert.   Skin:     General: Skin is warm and dry.   Psychiatric:         Attention and Perception: Attention normal.         Mood and Affect: Mood normal.         Speech: Speech normal.         Behavior: Behavior normal.         Thought Content: Thought content normal.         Judgment: Judgment normal.   Vitals and nursing note reviewed. Exam conducted with a chaperone present.            FHT:  125 bpm. Moderate variability, 15 x 15 accelerations, no decelerations     TOCO:   No contractions     Lab Results   Component Value Date    WBC 8.42 10/08/2024    HGB 10.6 (L) 10/08/2024    HCT 33.5 (L) 10/08/2024     10/08/2024     Lab Results   Component Value Date    K 3.9 03/22/2024     03/22/2024    CO2 27 03/22/2024    BUN 11 03/22/2024    CREATININE 0.58 (L) 03/22/2024    AST 17 04/12/2023    ALT 16 04/12/2023     Prenatal Labs: Reviewed      Blood type: O pos  Antibody: neg  GBS: neg  HIV: NR  Rubella: immune  Syphilis IgM/IgG: NR  HBsAg: NR  HCAb: NR  Chlamydia: neg  Gonorrhea: neg  Diabetes 1 hour screen: 149  3 hour glucose: wnl  Platelets: 210  Hgb: 10.6  >2 Midnights  INPATIENT     Signature/Title: Jazmín Blanc MD  Date: 10/8/2024  Time:  7:32 AM

## 2024-10-08 NOTE — ASSESSMENT & PLAN NOTE
QBL: 723mL; Hemoglobin 10.6g/dL --> 8.3, venofer ordered   Pain regimen: tania Tylenol, tania Toradol->Motrin; Alesha 5/10 PRN  Continue bowel regimen  VTE prophylaxis: Lovenox 40mg qD  Cuenca removed: ok void  Encourage ambulation and breastfeeding/pumping  Contraception: None , IVF pregnancy

## 2024-10-08 NOTE — ANESTHESIA PROCEDURE NOTES
Spinal Block    Patient location during procedure: OR  Start time: 10/8/2024 8:07 AM  Reason for block: primary anesthetic  Staffing  Performed by: Telly Nolasco DO  Authorized by: Telly Nolasco DO    Preanesthetic Checklist  Completed: patient identified, IV checked, site marked, risks and benefits discussed, surgical consent, monitors and equipment checked, pre-op evaluation and timeout performed  Spinal Block  Patient position: sitting  Prep: Betadine  Patient monitoring: cardiac monitor, continuous pulse ox, frequent blood pressure checks and heart rate  Approach: midline  Location: L3-4  Needle  Needle type: Pencan   Needle gauge: 24 G  Needle length: 3.5 in  Assessment  Sensory level: T4  Injection Assessment:  negative aspiration for heme, no paresthesia on injection and positive aspiration for clear CSF.  Post-procedure:  site cleaned

## 2024-10-08 NOTE — LACTATION NOTE
This note was copied from a baby's chart.  CONSULT - LACTATION  Baby Girl Klein (Ashley) 0 days female MRN: [id number]    [facility] NURSERY Room / Bed: (N)/(N) Encounter: [id number]    Maternal Information     MOTHER:  Lorraine Klein  Maternal Age: 34 y.o.  OB History: # 1 - Date: None, Sex: None, Weight: None, GA: None, Type: , Unspecified, Apgar1: None, Apgar5: None, Living: None, Birth Comments: None    # 2 - Date: 10/08/24, Sex: Female, Weight: 3490 g (7 lb 11.1 oz), GA: 39w2d, Type: , Low Transverse, Apgar1: 8, Apgar5: 9, Living: Living, Birth Comments: Dr. Cisneros present in OR at bedside at time of delivery.   Previouse breast reduction surgery? No    Lactation history:   Has patient previously breast fed: Yes   How long had patient previously breast fed: over 14 months   Previous breast feeding complications: None     Past Surgical History:   Procedure Laterality Date     SECTION  4/3/2015    [place] CYSTO/URETERO W/LITHOTRIPSY &INDWELL STENT INSRT Right 2018    Procedure: CYSTOSCOPY URETEROSCOPY; RETROGRADE PYELOGRAM AND INSERTION STENT URETERAL;  Surgeon: Gerry Guillen MD;  Location: [facility];  Service: Urology    US GUIDED THYROID BIOPSY  2021       Birth information:  YOB: 2026   Time of birth: 8:31 AM   Sex: female   Delivery type: , Low Transverse   Birth Weight: 3490 g (7 lb 11.1 oz)   Percent of Weight Change: 0%     Gestational Age: 39w2d   [unfilled]    Assessment     Breast and nipple assessment:  Denies need for assistance at this time     MacArthur Assessment: sleepy and held by visitors     Feeding assessment: feeding well as per experienced Mom     LATCH:  Latch: Too sleepy or reluctant, no latch achieved   Audible Swallowing: None   Type of Nipple: Everted (After stimulation)   Comfort (Breast/Nipple): Soft/non-tender   Hold (Positioning): Partial assist, teach one side, mother  does other, staff holds   LATCH Score: 5          Feeding recommendations:  breast feed on demand      In to see Experienced Family. Visitors at bedside. Ready, Set Baby and discharge breastfeeding booklets @ bedside including the feeding log. Emphasized 8 or more (12) feedings in a 24 hour period, what to expect for the number of diapers per day of life and the progression of properties of the  stooling pattern.    List of reasons to call a lactation consultant.  Feeding logs  Feeding cues  Hand expression  Baby's Second day (cluster feeding)  Breastfeeding and Your Lifestyle (Medications, Alcohol, Caffeine, Smoking, Street Drugs, Methadone)  First Two Weeks Survival Guide for Breastfeeding  Breast Changes  Physical Therapy  Storage and Handling of Breast milk  How to Keep Your Breast Pump Kit Clean  The Employed Breastfeeding Mother  Mixed feeding  Bottle feeding like breastfeeding (paced bottle feeding)  astfeeding and your lifestyle, storage and preparation of breast milk, how to keep you breast pump clean, the employed breastfeeding mother and paced bottle feeding handouts.     Booklet included Breastfeeding Resources for after discharge including access to the number for the [facility].      Encouraged parents to call for assistance, questions, and concerns about breastfeeding.  Extension provided.      Carissa Guerra RN 10/8/2024 4:48 PM

## 2024-10-08 NOTE — PLAN OF CARE
Problem: ANTEPARTUM  Goal: Maintain pregnancy as long as maternal and/or fetal condition is stable  Description: INTERVENTIONS:  - Maternal surveillance  - Fetal surveillance  - Monitor uterine activity  - Medications as ordered  - Bedrest  Outcome: Progressing     Problem: BIRTH - VAGINAL/ SECTION  Goal: Fetal and maternal status remain reassuring during the birth process  Description: INTERVENTIONS:  - Monitor vital signs  - Monitor fetal heart rate  - Monitor uterine activity  - Monitor labor progression (vaginal delivery)  - DVT prophylaxis  - Antibiotic prophylaxis  Outcome: Progressing  Goal: Emotionally satisfying birthing experience for mother/fetus  Description: Interventions:  - Assess, plan, implement and evaluate the nursing care given to the patient in labor  - Advocate the philosophy that each childbirth experience is a unique experience and support the family's chosen level of involvement and control during the labor process   - Actively participate in both the patient's and family's teaching of the birth process  - Consider cultural, Buddhist and age-specific factors and plan care for the patient in labor  Outcome: Progressing

## 2024-10-08 NOTE — ANESTHESIA POSTPROCEDURE EVALUATION
Post-Op Assessment Note    CV Status:  Stable  Pain Score: 1    Pain management: adequate       Mental Status:  Alert and awake   Hydration Status:  Euvolemic   PONV Controlled:  Controlled   Airway Patency:  Patent     Post Op Vitals Reviewed: Yes    No anethesia notable event occurred.    Staff: Anesthesiologist           Last Filed PACU Vitals:  Vitals Value Taken Time   Temp 97.5 °F (36.4 °C) 10/08/24 0930   Pulse 102 10/08/24 0950   /81 10/08/24 0950   Resp 18 10/08/24 0950   SpO2 95 % 10/08/24 0950       Modified Garrick:  Activity: 1 (10/8/2024  9:50 AM)  Respiration: 2 (10/8/2024  9:50 AM)  Circulation: 2 (10/8/2024  9:50 AM)  Consciousness: 2 (10/8/2024  9:50 AM)  Oxygen Saturation: 2 (10/8/2024  9:50 AM)  Modified Garrick Score: 9 (10/8/2024  9:50 AM)

## 2024-10-09 LAB
ERYTHROCYTE [DISTWIDTH] IN BLOOD BY AUTOMATED COUNT: 15.9 % (ref 11.6–15.1)
HCT VFR BLD AUTO: 26.7 % (ref 34.8–46.1)
HGB BLD-MCNC: 8.3 G/DL (ref 11.5–15.4)
MCH RBC QN AUTO: 25.5 PG (ref 26.8–34.3)
MCHC RBC AUTO-ENTMCNC: 31.1 G/DL (ref 31.4–37.4)
MCV RBC AUTO: 82 FL (ref 82–98)
PLATELET # BLD AUTO: 180 THOUSANDS/UL (ref 149–390)
PMV BLD AUTO: 13 FL (ref 8.9–12.7)
RBC # BLD AUTO: 3.26 MILLION/UL (ref 3.81–5.12)
WBC # BLD AUTO: 10.49 THOUSAND/UL (ref 4.31–10.16)

## 2024-10-09 PROCEDURE — NC001 PR NO CHARGE: Performed by: NURSE PRACTITIONER

## 2024-10-09 PROCEDURE — 85027 COMPLETE CBC AUTOMATED: CPT

## 2024-10-09 RX ORDER — ACETAMINOPHEN 325 MG/1
650 TABLET ORAL EVERY 6 HOURS
Status: DISCONTINUED | OUTPATIENT
Start: 2024-10-09 | End: 2024-10-10 | Stop reason: HOSPADM

## 2024-10-09 RX ORDER — IBUPROFEN 600 MG/1
600 TABLET, FILM COATED ORAL EVERY 6 HOURS PRN
Status: DISCONTINUED | OUTPATIENT
Start: 2024-10-09 | End: 2024-10-10 | Stop reason: HOSPADM

## 2024-10-09 RX ORDER — ESCITALOPRAM OXALATE 10 MG/1
5 TABLET ORAL DAILY
Status: DISCONTINUED | OUTPATIENT
Start: 2024-10-09 | End: 2024-10-09

## 2024-10-09 RX ORDER — ESCITALOPRAM OXALATE 10 MG/1
5 TABLET ORAL DAILY
Status: DISCONTINUED | OUTPATIENT
Start: 2024-10-09 | End: 2024-10-10 | Stop reason: HOSPADM

## 2024-10-09 RX ADMIN — ACETAMINOPHEN 650 MG: 325 TABLET, FILM COATED ORAL at 13:21

## 2024-10-09 RX ADMIN — ENOXAPARIN SODIUM 40 MG: 40 INJECTION SUBCUTANEOUS at 09:06

## 2024-10-09 RX ADMIN — ACETAMINOPHEN 650 MG: 325 TABLET, FILM COATED ORAL at 20:26

## 2024-10-09 RX ADMIN — KETOROLAC TROMETHAMINE 30 MG: 30 INJECTION, SOLUTION INTRAMUSCULAR; INTRAVENOUS at 02:53

## 2024-10-09 RX ADMIN — ACETAMINOPHEN 650 MG: 325 TABLET, FILM COATED ORAL at 05:33

## 2024-10-09 RX ADMIN — IBUPROFEN 600 MG: 600 TABLET, FILM COATED ORAL at 10:28

## 2024-10-09 RX ADMIN — IRON SUCROSE 200 MG: 20 INJECTION, SOLUTION INTRAVENOUS at 09:09

## 2024-10-09 RX ADMIN — ACETAMINOPHEN 650 MG: 325 TABLET, FILM COATED ORAL at 00:34

## 2024-10-09 RX ADMIN — ESCITALOPRAM OXALATE 5 MG: 10 TABLET ORAL at 09:26

## 2024-10-09 RX ADMIN — IBUPROFEN 600 MG: 600 TABLET, FILM COATED ORAL at 16:44

## 2024-10-09 RX ADMIN — IBUPROFEN 600 MG: 600 TABLET, FILM COATED ORAL at 23:27

## 2024-10-09 NOTE — PLAN OF CARE
Problem: POSTPARTUM  Goal: Experiences normal postpartum course  Description: INTERVENTIONS:  - Monitor maternal vital signs  - Assess uterine involution and lochia  Outcome: Progressing  Goal: Appropriate maternal -  bonding  Description: INTERVENTIONS:  - Identify family support  - Assess for appropriate maternal/infant bonding   -Encourage maternal/infant bonding opportunities  - Referral to  or  as needed  Outcome: Progressing  Goal: Establishment of infant feeding pattern  Description: INTERVENTIONS:  - Assess breast/bottle feeding  - Refer to lactation as needed  Outcome: Progressing  Goal: Incision(s), wounds(s) or drain site(s) healing without S/S of infection  Description: INTERVENTIONS  - Assess and document dressing, incision, wound bed, drain sites and surrounding tissue  - Provide patient and family education  - Perform skin care/dressing changes every     Outcome: Progressing     Problem: POSTPARTUM  Goal: Experiences normal postpartum course  Description: INTERVENTIONS:  - Monitor maternal vital signs  - Assess uterine involution and lochia  Outcome: Progressing  Goal: Appropriate maternal -  bonding  Description: INTERVENTIONS:  - Identify family support  - Assess for appropriate maternal/infant bonding   -Encourage maternal/infant bonding opportunities  - Referral to  or  as needed  Outcome: Progressing  Goal: Establishment of infant feeding pattern  Description: INTERVENTIONS:  - Assess breast/bottle feeding  - Refer to lactation as needed  Outcome: Progressing  Goal: Incision(s), wounds(s) or drain site(s) healing without S/S of infection  Description: INTERVENTIONS  - Assess and document dressing, incision, wound bed, drain sites and surrounding tissue  - Provide patient and family education  - Perform skin care/dressing changes every     Outcome: Progressing     Problem: POSTPARTUM  Goal: Experiences normal postpartum  course  Description: INTERVENTIONS:  - Monitor maternal vital signs  - Assess uterine involution and lochia  Outcome: Progressing  Goal: Appropriate maternal -  bonding  Description: INTERVENTIONS:  - Identify family support  - Assess for appropriate maternal/infant bonding   -Encourage maternal/infant bonding opportunities  - Referral to  or  as needed  Outcome: Progressing  Goal: Establishment of infant feeding pattern  Description: INTERVENTIONS:  - Assess breast/bottle feeding  - Refer to lactation as needed  Outcome: Progressing  Goal: Incision(s), wounds(s) or drain site(s) healing without S/S of infection  Description: INTERVENTIONS  - Assess and document dressing, incision, wound bed, drain sites and surrounding tissue  - Provide patient and family education  - Perform skin care/dressing changes every     Outcome: Progressing     Problem: POSTPARTUM  Goal: Experiences normal postpartum course  Description: INTERVENTIONS:  - Monitor maternal vital signs  - Assess uterine involution and lochia  Outcome: Progressing  Goal: Appropriate maternal -  bonding  Description: INTERVENTIONS:  - Identify family support  - Assess for appropriate maternal/infant bonding   -Encourage maternal/infant bonding opportunities  - Referral to  or  as needed  Outcome: Progressing  Goal: Establishment of infant feeding pattern  Description: INTERVENTIONS:  - Assess breast/bottle feeding  - Refer to lactation as needed  Outcome: Progressing  Goal: Incision(s), wounds(s) or drain site(s) healing without S/S of infection  Description: INTERVENTIONS  - Assess and document dressing, incision, wound bed, drain sites and surrounding tissue  - Provide patient and family education  - Perform skin care/dressing changes every         Problem: POSTPARTUM  Goal: Experiences normal postpartum course  Description: INTERVENTIONS:  - Monitor maternal vital signs  - Assess uterine  involution and lochia  Outcome: Progressing  Goal: Appropriate maternal -  bonding  Description: INTERVENTIONS:  - Identify family support  - Assess for appropriate maternal/infant bonding   -Encourage maternal/infant bonding opportunities  - Referral to  or  as needed  Outcome: Progressing  Goal: Establishment of infant feeding pattern  Description: INTERVENTIONS:  - Assess breast/bottle feeding  - Refer to lactation as needed  Outcome: Progressing  Goal: Incision(s), wounds(s) or drain site(s) healing without S/S of infection  Description: INTERVENTIONS  - Assess and document dressing, incision, wound bed, drain sites and surrounding tissue  - Provide patient and family education  - Perform skin care/dressing changes every       Outcome: Progressing     Outcome: Progressing

## 2024-10-09 NOTE — PROGRESS NOTES
Progress Note - OB/GYN   Name: Lorraine Klein 34 y.o. female I MRN: [id number]  Unit/Bed#: -01 I Date of Admission: 10/8/2024   Date of Service: 10/9/2024 I Hospital Day: 1     Assessment & Plan  Status post repeat low transverse  section  QBL: 723mL; Hemoglobin 10.6g/dL --> 8.3, venofer ordered   Pain regimen: tania Tylenol, tania Toradol->Motrin; Alesha 5/10 PRN  Continue bowel regimen  VTE prophylaxis: Lovenox 40mg qD  Cuenca removed: ok void  Encourage ambulation and breastfeeding/pumping  Contraception: None , IVF pregnancy   PTSD (post-traumatic stress disorder)    History of  delivery, antepartum  Plan for repeat LTCS  Pregnancy resulting from assisted reproductive technology in second trimester  [facility]F with [facility]      Assessment and Plan     Lorraine Klein is a patient of: [facility]. She is PPD# 1 s/p  repeat  section, low transverse incision  Recovering well and is stable       Pregnancy resulting from assisted reproductive technology in second trimester  Assessment & Plan  IVF with [facility]    History of  delivery, antepartum  Assessment & Plan  Plan for repeat LTCS    * Status post repeat low transverse  section  Assessment & Plan  QBL: 723mL; Hemoglobin 10.6g/dL --> 8.3, venofer ordered   Pain regimen: tania Tylenol, tania Toradol->Motrin; Alesha 5/10 PRN  Continue bowel regimen  VTE prophylaxis: Lovenox 40mg qD  Cuenca removed: ok void  Encourage ambulation and breastfeeding/pumping  Contraception: None , IVF pregnancy         Disposition    - Anticipate discharge home on PPD# 2-3      Subjective/Objective     Chief Complaint: Postpartum State     Subjective:    Lorraine Klein is PPD/POD# 2 s/p  repeat  section, low transverse incision. She has no current complaints.  Pain is well controlled. She is recovering well and is stable.     Breastfeeding:  yes  Tolerating PO: yes  Nausea or Vomiting: no  Voiding: yes  Flatus: yes; has had no bowel movement.   Ambulating:  yes  Chest pain: no  Shortness of breath: no  Leg pain: no  Lochia: small    Vitals:   /78 (BP Location: Right arm)   Pulse 71   Temp 97.5 °F (36.4 °C) (Oral)   Resp 18   LMP 01/08/2024 (Within Weeks)   SpO2 97%   Breastfeeding Yes       Intake/Output Summary (Last 24 hours) at 10/9/2024 0814  Last data filed at 10/9/2024 0301  Gross per 24 hour   Intake 2000 ml   Output 3723 ml   Net -1723 ml       Physical Exam:   GEN: Lorraine Klein appears well, alert and oriented x 3, pleasant and cooperative   CARDIO: RRR, no murmurs or rubs  RESP:  CTAB, no wheezes or rales  ABDOMEN: soft, no tenderness, no distention, fundus @ U, Incision C/D/I  EXTREMITIES: SCDs on, trace edema, b/l Justo's sign negative      Labs:     Hemoglobin   Date Value Ref Range Status   10/09/2024 8.3 (L) 11.5 - 15.4 g/dL Final   10/08/2024 10.6 (L) 11.5 - 15.4 g/dL Final   04/04/2015 11.4 (L) 11.5 - 15.4 g/dL Final   04/03/2015 11.9 11.5 - 15.4 g/dL Final     WBC   Date Value Ref Range Status   10/09/2024 10.49 (H) 4.31 - 10.16 Thousand/uL Final   10/08/2024 8.42 4.31 - 10.16 Thousand/uL Final   04/04/2015 11.77 (H) 4.31 - 10.16 Thousand/uL Final   04/03/2015 9.36 4.31 - 10.16 Thousand/uL Final     Platelets   Date Value Ref Range Status   10/09/2024 180 149 - 390 Thousands/uL Final   10/08/2024 210 149 - 390 Thousands/uL Final   04/04/2015 169 149 - 390 Thousand/uL Final   04/03/2015 192 149 - 390 Thousand/uL Final     Creatinine   Date Value Ref Range Status   03/22/2024 0.58 (L) 0.60 - 1.30 mg/dL Final     Comment:     Standardized to IDMS reference method   04/12/2023 0.73 0.40 - 1.10 mg/dL Final   01/23/2023 0.74 0.60 - 1.30 mg/dL Final     Comment:     Standardized to IDMS reference method   10/08/2019 0.76 0.40 - 1.10 mg/dL Final     AST   Date Value Ref Range Status   04/12/2023 17 <41 U/L Final   01/23/2023 25 13 - 39 U/L Final     Comment:     Specimen collection should occur prior to Sulfasalazine administration due to the  potential for falsely depressed results.    10/08/2019 19 <41 U/L Final     ALT   Date Value Ref Range Status   04/12/2023 16 <56 U/L Final   01/23/2023 28 7 - 52 U/L Final     Comment:     Specimen collection should occur prior to Sulfasalazine administration due to the potential for falsely depressed results.    10/08/2019 24 <56 U/L Final          LIGIA Romo  10/9/2024  8:14 AM

## 2024-10-09 NOTE — LACTATION NOTE
This note was copied from a baby's chart.    In to see experienced Family. Mom states nursing is going well. Denies need for assistance OR supplies at this time.        10/09/24 5531   Maternal Information   Has mother  before? Yes   How long did the the mother previously breastfeed? over 14 months   Previous Maternal Breastfeeding Challenges None   Infant to breast within first hour of birth? No   Breastfeeding Delayed Due to Maternal status   Exclusive Pump and Bottle Feed No   LATCH Documentation   Having latch problems? No  (as per experienced Mom; documented latch score = 9)   Breasts/Nipples   Intervention Other (comment)  (Review of support availabkle)   Breastfeeding Status Yes   Breastfeeding Progress Not yet established   Breast Pump   Pump 3  (has [employer] - Free breast pump)   Patient Follow-Up   Lactation Consult Status 2   Follow-Up Type Inpatient;Call as needed   Other OB Lactation Documentation    Additional Problem Noted Experienced Mom states nursing is going well  (has BOTH Booklets)       Encouraged parents to call for assistance, questions, and concerns about breastfeeding.  Extension provided.

## 2024-10-10 ENCOUNTER — TRANSITIONAL CARE MANAGEMENT (OUTPATIENT)
Dept: FAMILY MEDICINE CLINIC | Facility: CLINIC | Age: 34
End: 2024-10-10

## 2024-10-10 VITALS
OXYGEN SATURATION: 98 % | RESPIRATION RATE: 18 BRPM | HEART RATE: 82 BPM | SYSTOLIC BLOOD PRESSURE: 125 MMHG | TEMPERATURE: 98.4 F | DIASTOLIC BLOOD PRESSURE: 90 MMHG

## 2024-10-10 PROBLEM — O13.9 GESTATIONAL HYPERTENSION: Status: ACTIVE | Noted: 2024-10-10

## 2024-10-10 PROCEDURE — NC001 PR NO CHARGE: Performed by: OBSTETRICS & GYNECOLOGY

## 2024-10-10 RX ORDER — ACETAMINOPHEN 325 MG/1
650 TABLET ORAL EVERY 6 HOURS
Qty: 30 TABLET | Refills: 0 | Status: SHIPPED | OUTPATIENT
Start: 2024-10-10 | End: 2024-10-14

## 2024-10-10 RX ORDER — IBUPROFEN 600 MG/1
600 TABLET, FILM COATED ORAL EVERY 6 HOURS PRN
Qty: 30 TABLET | Refills: 0 | Status: SHIPPED | OUTPATIENT
Start: 2024-10-10

## 2024-10-10 RX ADMIN — ESCITALOPRAM OXALATE 5 MG: 10 TABLET ORAL at 08:07

## 2024-10-10 RX ADMIN — ENOXAPARIN SODIUM 40 MG: 40 INJECTION SUBCUTANEOUS at 08:07

## 2024-10-10 RX ADMIN — IBUPROFEN 600 MG: 600 TABLET, FILM COATED ORAL at 05:20

## 2024-10-10 RX ADMIN — ACETAMINOPHEN 650 MG: 325 TABLET, FILM COATED ORAL at 02:24

## 2024-10-10 RX ADMIN — IBUPROFEN 600 MG: 600 TABLET, FILM COATED ORAL at 11:55

## 2024-10-10 RX ADMIN — ACETAMINOPHEN 650 MG: 325 TABLET, FILM COATED ORAL at 08:07

## 2024-10-10 NOTE — ASSESSMENT & PLAN NOTE
QBL: 723mL; Hemoglobin 10.6g/dL --> 8.3, venofer ordered   Pain regimen: tania Tylenol, tania Toradol->Motrin; Alesha 5/10 PRN  Continue bowel regimen  VTE prophylaxis: Lovenox 40mg qD  Cuenca removed: ok void  Encourage ambulation and breastfeeding/pumping  Contraception: None, IVF pregnancy

## 2024-10-10 NOTE — DISCHARGE INSTR - AVS FIRST PAGE
Gestational Hypertension:    Please monitor your blood pressure twice daily following discharge to home and write them all down to bring to your doctor appointment.  Call the office for any top numbers over 150 or bottom numbers greater than 100.  Please call if you have a headache unresolved with medication, vision changes, right side abdominal pain or increased swelling.  Call and make an appointment to see your doctor within 1 week of delivery to check your blood pressures.

## 2024-10-10 NOTE — ASSESSMENT & PLAN NOTE
Patient now meets criteria for GHTN due to an elevated blood pressure of 138/95 on 10/8 and a BP of 125/90 this morning  Denies any symptoms of preeclampsia including headache, swelling, changes in vision, RUQ pain  Discussed postpartum blood pressure monitoring program with patient, states she already has a blood pressure cuff at home and checks regularly and declines joining the program. Counseled patient that if she notices her blood pressures at or above 140s/90s that she should call her doctor and may need to be started on a blood pressure medication. Symptoms of preeclampsia discussed and patient understands to call her doctor should she experience

## 2024-10-10 NOTE — PROGRESS NOTES
Progress Note - OB/GYN   Name: Lorraine Klein 34 y.o. female I MRN: [id number]  Unit/Bed#: -01 I Date of Admission: 10/8/2024   Date of Service: 10/10/2024 I Hospital Day: 2       Lorraine Klein is a patient of: [facility]. She is PPD# 2 s/p  repeat  section, low transverse incision  Recovering well and is stable   Assessment & Plan  Status post repeat low transverse  section  QBL: 723mL; Hemoglobin 10.6g/dL --> 8.3, venofer ordered   Pain regimen: tania Tylenol, tania Toradol->Motrin; Alesha 5/10 PRN  Continue bowel regimen  VTE prophylaxis: Lovenox 40mg qD  Cuenca removed: ok void  Encourage ambulation and breastfeeding/pumping  Contraception: None, IVF pregnancy   PTSD (post-traumatic stress disorder)  Continue Lexapro  History of  delivery, antepartum  Plan for repeat LTCS  Pregnancy resulting from assisted reproductive technology in second trimester  [facility] with [facility]  Gestational hypertension  Patient now meets criteria for GHTN due to an elevated blood pressure of 138/95 on 10/8 and a BP of 125/90 this morning  Denies any symptoms of preeclampsia including headache, swelling, changes in vision, RUQ pain  Discussed postpartum blood pressure monitoring program with patient, states she already has a blood pressure cuff at home and checks regularly and declines joining the program. Counseled patient that if she notices her blood pressures at or above 140s/90s that she should call her doctor and may need to be started on a blood pressure medication. Symptoms of preeclampsia discussed and patient understands to call her doctor should she experience      Disposition    - Anticipate discharge home on PPD# 2 - today      Subjective/Objective     Chief Complaint: Postpartum State     Subjective:    Lorraine Klein is POD# 2 s/p  repeat  section, low transverse incision. She has no current complaints.  Pain is well controlled. She is recovering well and is stable.     Breastfeeding:   yes  Tolerating PO: yes  Nausea or Vomiting: no  Voiding: yes  Flatus: yes; has had no bowel movement.   Ambulating: yes  Chest pain: no  Shortness of breath: no  Leg pain: no  Lochia: small    Vitals:   /90 (BP Location: Left arm)   Pulse 82   Temp 98.4 °F (36.9 °C) (Tympanic)   Resp 18   LMP 01/08/2024 (Within Weeks)   SpO2 98%   Breastfeeding Yes     No intake or output data in the 24 hours ending 10/10/24 1017      Physical Exam:   GEN: Lorraine Klein appears well, alert and oriented x 3, pleasant and cooperative   CARDIO: RRR, no murmurs or rubs  RESP:  CTAB, no wheezes or rales  ABDOMEN: soft, no tenderness, no distention, fundus @ U, Incision C/D/I  EXTREMITIES: SCDs on, trace edema, b/l Justo's sign negative      Labs:     Hemoglobin   Date Value Ref Range Status   10/09/2024 8.3 (L) 11.5 - 15.4 g/dL Final   10/08/2024 10.6 (L) 11.5 - 15.4 g/dL Final   04/04/2015 11.4 (L) 11.5 - 15.4 g/dL Final   04/03/2015 11.9 11.5 - 15.4 g/dL Final     WBC   Date Value Ref Range Status   10/09/2024 10.49 (H) 4.31 - 10.16 Thousand/uL Final   10/08/2024 8.42 4.31 - 10.16 Thousand/uL Final   04/04/2015 11.77 (H) 4.31 - 10.16 Thousand/uL Final   04/03/2015 9.36 4.31 - 10.16 Thousand/uL Final     Platelets   Date Value Ref Range Status   10/09/2024 180 149 - 390 Thousands/uL Final   10/08/2024 210 149 - 390 Thousands/uL Final   04/04/2015 169 149 - 390 Thousand/uL Final   04/03/2015 192 149 - 390 Thousand/uL Final     Creatinine   Date Value Ref Range Status   03/22/2024 0.58 (L) 0.60 - 1.30 mg/dL Final     Comment:     Standardized to IDMS reference method   04/12/2023 0.73 0.40 - 1.10 mg/dL Final   01/23/2023 0.74 0.60 - 1.30 mg/dL Final     Comment:     Standardized to IDMS reference method   10/08/2019 0.76 0.40 - 1.10 mg/dL Final     AST   Date Value Ref Range Status   04/12/2023 17 <41 U/L Final   01/23/2023 25 13 - 39 U/L Final     Comment:     Specimen collection should occur prior to Sulfasalazine  administration due to the potential for falsely depressed results.    10/08/2019 19 <41 U/L Final     ALT   Date Value Ref Range Status   04/12/2023 16 <56 U/L Final   01/23/2023 28 7 - 52 U/L Final     Comment:     Specimen collection should occur prior to Sulfasalazine administration due to the potential for falsely depressed results.    10/08/2019 24 <56 U/L Final          Elidia Borja MD  10/10/2024  10:17 AM

## 2024-10-10 NOTE — PLAN OF CARE
Problem: POSTPARTUM  Goal: Experiences normal postpartum course  Description: INTERVENTIONS:  - Monitor maternal vital signs  - Assess uterine involution and lochia  Outcome: Adequate for Discharge  Goal: Appropriate maternal -  bonding  Description: INTERVENTIONS:  - Identify family support  - Assess for appropriate maternal/infant bonding   -Encourage maternal/infant bonding opportunities  - Referral to  or  as needed  Outcome: Adequate for Discharge  Goal: Establishment of infant feeding pattern  Description: INTERVENTIONS:  - Assess breast/bottle feeding  - Refer to lactation as needed  Outcome: Adequate for Discharge  Goal: Incision(s), wounds(s) or drain site(s) healing without S/S of infection  Description: INTERVENTIONS  - Assess and document dressing, incision, wound bed, drain sites and surrounding tissue  - Provide patient and family education    Outcome: Adequate for Discharge     Problem: PAIN - ADULT  Goal: Verbalizes/displays adequate comfort level or baseline comfort level  Description: Interventions:  - Encourage patient to monitor pain and request assistance  - Assess pain using appropriate pain scale  - Administer analgesics based on type and severity of pain and evaluate response  - Implement non-pharmacological measures as appropriate and evaluate response  - Consider cultural and social influences on pain and pain management  - Notify physician/advanced practitioner if interventions unsuccessful or patient reports new pain  Outcome: Adequate for Discharge     Problem: INFECTION - ADULT  Goal: Absence or prevention of progression during hospitalization  Description: INTERVENTIONS:  - Assess and monitor for signs and symptoms of infection  - Monitor lab/diagnostic results  - Monitor all insertion sites, i.e. indwelling lines, tubes, and drains  - Monitor endotracheal if appropriate and nasal secretions for changes in amount and color  - [place] appropriate  cooling/warming therapies per order  - Administer medications as ordered  - Instruct and encourage patient and family to use good hand hygiene technique  - Identify and instruct in appropriate isolation precautions for identified infection/condition  Outcome: Adequate for Discharge  Goal: Absence of fever/infection during neutropenic period  Description: INTERVENTIONS:  - Monitor WBC    Outcome: Adequate for Discharge     Problem: SAFETY ADULT  Goal: Patient will remain free of falls  Description: INTERVENTIONS:  - Educate patient/family on patient safety including physical limitations  - Instruct patient to call for assistance with activity   - Consult OT/PT to assist with strengthening/mobility   - Keep Call bell within reach  - Keep bed low and locked with side rails adjusted as appropriate  - Keep care items and personal belongings within reach  - Initiate and maintain comfort rounds  - Make Fall Risk Sign visible to staff  - Apply yellow socks and bracelet for high fall risk patients  - Consider moving patient to room near nurses station  Outcome: Adequate for Discharge  Goal: Maintain or return to baseline ADL function  Description: INTERVENTIONS:  -  Assess patient's ability to carry out ADLs; assess patient's baseline for ADL function and identify physical deficits which impact ability to perform ADLs (bathing, care of mouth/teeth, toileting, grooming, dressing, etc.)  - Assess/evaluate cause of self-care deficits   - Assess range of motion  - Assess patient's mobility; develop plan if impaired  - Assess patient's need for assistive devices and provide as appropriate  - Encourage maximum independence but intervene and supervise when necessary  - Involve family in performance of ADLs  - Assess for home care needs following discharge   - Consider OT consult to assist with ADL evaluation and planning for discharge  - Provide patient education as appropriate  Outcome: Adequate for Discharge  Goal:  Maintains/Returns to pre admission functional level  Description: INTERVENTIONS:  - Perform AM-PAC 6 Click Basic Mobility/ Daily Activity assessment daily.  - Set and communicate daily mobility goal to care team and patient/family/caregiver.   - Collaborate with rehabilitation services on mobility goals if consulted- Out of bed for toileting  - Record patient progress and toleration of activity level   Outcome: Adequate for Discharge     Problem: Knowledge Deficit  Goal: Patient/family/caregiver demonstrates understanding of disease process, treatment plan, medications, and discharge instructions  Description: Complete learning assessment and assess knowledge base.  Interventions:  - Provide teaching at level of understanding  - Provide teaching via preferred learning methods  Outcome: Adequate for Discharge     Problem: DISCHARGE PLANNING  Goal: Discharge to home or other facility with appropriate resources  Description: INTERVENTIONS:  - Identify barriers to discharge w/patient and caregiver  - Arrange for needed discharge resources and transportation as appropriate  - Identify discharge learning needs (meds, wound care, etc.)  - Arrange for interpretive services to assist at discharge as needed  - Refer to Case Management Department for coordinating discharge planning if the patient needs post-hospital services based on physician/advanced practitioner order or complex needs related to functional status, cognitive ability, or social support system  Outcome: Adequate for Discharge

## 2024-10-10 NOTE — PLAN OF CARE
Problem: POSTPARTUM  Goal: Experiences normal postpartum course  Description: INTERVENTIONS:  - Monitor maternal vital signs  - Assess uterine involution and lochia  10/9/2024 2035 by Ivanna Machuca RN  Outcome: Progressing  10/9/2024 2035 by Ivanna Machuca RN  Outcome: Progressing  Goal: Appropriate maternal -  bonding  Description: INTERVENTIONS:  - Identify family support  - Assess for appropriate maternal/infant bonding   -Encourage maternal/infant bonding opportunities  - Referral to  or  as needed  10/9/2024 2035 by Ivanna Machuca RN  Outcome: Progressing  10/9/2024 2035 by Ivanna Machuca RN  Outcome: Progressing  Goal: Establishment of infant feeding pattern  Description: INTERVENTIONS:  - Assess breast/bottle feeding  - Refer to lactation as needed  10/9/2024 2035 by Ivanna Machuca RN  Outcome: Progressing  10/9/2024 2035 by Ivanna Machuca RN  Outcome: Progressing  Goal: Incision(s), wounds(s) or drain site(s) healing without S/S of infection  Description: INTERVENTIONS  - Assess and document dressing, incision, wound bed, drain sites and surrounding tissue  - Provide patient and family education  - Perform skin care/dressing changes every   10/9/2024 2035 by Ivanna Machuca RN  Outcome: Progressing  10/9/2024 2035 by Ivanna Machuca RN  Outcome: Progressing     Problem: PAIN - ADULT  Goal: Verbalizes/displays adequate comfort level or baseline comfort level  Description: Interventions:  - Encourage patient to monitor pain and request assistance  - Assess pain using appropriate pain scale  - Administer analgesics based on type and severity of pain and evaluate response  - Implement non-pharmacological measures as appropriate and evaluate response  - Consider cultural and social influences on pain and pain management  - Notify physician/advanced practitioner if interventions unsuccessful or patient reports new pain  Outcome: Progressing      Problem: INFECTION - ADULT  Goal: Absence or prevention of progression during hospitalization  Description: INTERVENTIONS:  - Assess and monitor for signs and symptoms of infection  - Monitor lab/diagnostic results  - Monitor all insertion sites, i.e. indwelling lines, tubes, and drains  - Monitor endotracheal if appropriate and nasal secretions for changes in amount and color  - [place] appropriate cooling/warming therapies per order  - Administer medications as ordered  - Instruct and encourage patient and family to use good hand hygiene technique  - Identify and instruct in appropriate isolation precautions for identified infection/condition  Outcome: Progressing  Goal: Absence of fever/infection during neutropenic period  Description: INTERVENTIONS:  - Monitor WBC    Outcome: Progressing     Problem: SAFETY ADULT  Goal: Patient will remain free of falls  Description: INTERVENTIONS:  - Educate patient/family on patient safety including physical limitations  - Instruct patient to call for assistance with activity   - Consult OT/PT to assist with strengthening/mobility   - Keep Call bell within reach  - Keep bed low and locked with side rails adjusted as appropriate  - Keep care items and personal belongings within reach  - Initiate and maintain comfort rounds  - Make Fall Risk Sign visible to staff  - Offer Toileting every  Hours, in advance of need  - Initiate/Maintain alarm  - Obtain necessary fall risk management equipment:   - Apply yellow socks and bracelet for high fall risk patients  - Consider moving patient to room near nurses station  Outcome: Progressing  Goal: Maintain or return to baseline ADL function  Description: INTERVENTIONS:  -  Assess patient's ability to carry out ADLs; assess patient's baseline for ADL function and identify physical deficits which impact ability to perform ADLs (bathing, care of mouth/teeth, toileting, grooming, dressing, etc.)  - Assess/evaluate cause of self-care  deficits   - Assess range of motion  - Assess patient's mobility; develop plan if impaired  - Assess patient's need for assistive devices and provide as appropriate  - Encourage maximum independence but intervene and supervise when necessary  - Involve family in performance of ADLs  - Assess for home care needs following discharge   - Consider OT consult to assist with ADL evaluation and planning for discharge  - Provide patient education as appropriate  Outcome: Progressing  Goal: Maintains/Returns to pre admission functional level  Description: INTERVENTIONS:  - Perform AM-PAC 6 Click Basic Mobility/ Daily Activity assessment daily.  - Set and communicate daily mobility goal to care team and patient/family/caregiver.   - Collaborate with rehabilitation services on mobility goals if consulted  - Perform Range of Motion times a day.  - Reposition patient every  hours.  - Dangle patient  times a day  - Stand patient  times a day  - Ambulate patient  times a day  - Out of bed to chair times a day   - Out of bed for meals  times a day  - Out of bed for toileting  - Record patient progress and toleration of activity level   Outcome: Progressing     Problem: Knowledge Deficit  Goal: Patient/family/caregiver demonstrates understanding of disease process, treatment plan, medications, and discharge instructions  Description: Complete learning assessment and assess knowledge base.  Interventions:  - Provide teaching at level of understanding  - Provide teaching via preferred learning methods  Outcome: Progressing     Problem: DISCHARGE PLANNING  Goal: Discharge to home or other facility with appropriate resources  Description: INTERVENTIONS:  - Identify barriers to discharge w/patient and caregiver  - Arrange for needed discharge resources and transportation as appropriate  - Identify discharge learning needs (meds, wound care, etc.)  - Arrange for interpretive services to assist at discharge as needed  - Refer to Case  Management Department for coordinating discharge planning if the patient needs post-hospital services based on physician/advanced practitioner order or complex needs related to functional status, cognitive ability, or social support system  Outcome: Progressing

## 2024-10-10 NOTE — LACTATION NOTE
This note was copied from a baby's chart.     10/10/24 4960   Lactation Consultation   Reason for Consult 10 minutes   Maternal Information   Has mother  before? Yes   How long did the the mother previously breastfeed? over 14 months, 9 years ago   Previous Maternal Breastfeeding Challenges None   Infant to breast within first hour of birth? No   Breastfeeding Delayed Due to Maternal status   Exclusive Pump and Bottle Feed No   Breasts/Nipples   Breastfeeding Status Yes   Breastfeeding Progress Not yet established;Breastfeeding well  (per mom)   Patient Follow-Up   Lactation Consult Status 2   Follow-Up Type Inpatient;Outpatient;Call as needed   Other OB Lactation Documentation    Additional Problem Noted Mother reports breastfeeding going well. Denies further assistance.     Consulted with patient to follow up and discuss discharge breastfeeding anticipation guidelines. Patient states that infant is latching well, states that she feels a strong but comfortable tugging sensation when infant is sucking. Reports that baby's stools are starting to transition.    Discussed the importance of ensuring that baby feeds 8-12x in 24 hours and not waiting over 3 hours to feed. Reiterated to watch the baby for hunger and fullness cues.    Encouraged family to monitor baby's outputs, ensuring baby is reaching goal diapers. Discussed that soiled diapers transition by changing color from black meconium to yellow/seedy by day 5.    Discussed initial engorgement time frame and reviewed engorgement comfort measures.    Discussed community resources for continued breastfeeding support once discharged home. Encouraged to contact with [facility].    Parents were encouraged to call for further questions that arise prior to discharge.

## 2024-10-16 LAB — PLACENTA IN STORAGE: NORMAL

## 2024-11-30 LAB
T4 FREE SERPL-MCNC: 0.71 NG/DL (ref 0.61–1.12)
T4 SERPL-MCNC: 10.69 UG/DL (ref 6.09–12.23)
TSH SERPL-ACNC: 1.03 UIU/ML (ref 0.45–5.33)

## 2024-12-03 ENCOUNTER — RESULTS FOLLOW-UP (OUTPATIENT)
Dept: ENDOCRINOLOGY | Facility: CLINIC | Age: 34
End: 2024-12-03

## 2024-12-07 ENCOUNTER — APPOINTMENT (EMERGENCY)
Dept: RADIOLOGY | Facility: HOSPITAL | Age: 34
End: 2024-12-07
Payer: COMMERCIAL

## 2024-12-07 ENCOUNTER — HOSPITAL ENCOUNTER (EMERGENCY)
Facility: HOSPITAL | Age: 34
Discharge: HOME | End: 2024-12-07
Attending: STUDENT IN AN ORGANIZED HEALTH CARE EDUCATION/TRAINING PROGRAM | Admitting: STUDENT IN AN ORGANIZED HEALTH CARE EDUCATION/TRAINING PROGRAM
Payer: COMMERCIAL

## 2024-12-07 ENCOUNTER — APPOINTMENT (EMERGENCY)
Dept: CT IMAGING | Facility: HOSPITAL | Age: 34
End: 2024-12-07
Payer: COMMERCIAL

## 2024-12-07 VITALS
RESPIRATION RATE: 18 BRPM | BODY MASS INDEX: 28.81 KG/M2 | TEMPERATURE: 98.6 F | WEIGHT: 195.11 LBS | DIASTOLIC BLOOD PRESSURE: 79 MMHG | SYSTOLIC BLOOD PRESSURE: 116 MMHG | HEART RATE: 94 BPM | OXYGEN SATURATION: 100 %

## 2024-12-07 DIAGNOSIS — T14.8XXA CONTUSION OF SOFT TISSUE: ICD-10-CM
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
## 2024-12-07 DIAGNOSIS — S52.509A DISTAL RADIUS FRACTURE: ICD-10-CM

## 2024-12-07 DIAGNOSIS — S62.231A OTHER DISPLACED FRACTURE OF BASE OF FIRST METACARPAL BONE, RIGHT HAND, INITIAL ENCOUNTER FOR CLOSED FRACTURE: ICD-10-CM

## 2024-12-07 LAB
ABO GROUP BLD: NORMAL
ANION GAP SERPL CALCULATED.3IONS-SCNC: 7 MMOL/L (ref 4–13)
BASOPHILS # BLD AUTO: 0.04 THOUSANDS/ÂΜL (ref 0–0.1)
BASOPHILS NFR BLD AUTO: 0 % (ref 0–1)
BLD GP AB SCN SERPL QL: NEGATIVE
BUN SERPL-MCNC: 13 MG/DL (ref 5–25)
CALCIUM SERPL-MCNC: 9.8 MG/DL (ref 8.4–10.2)
CHLORIDE SERPL-SCNC: 102 MMOL/L (ref 96–108)
CO2 SERPL-SCNC: 30 MMOL/L (ref 21–32)
CREAT SERPL-MCNC: 0.84 MG/DL (ref 0.6–1.3)
EOSINOPHIL # BLD AUTO: 0.03 THOUSAND/ÂΜL (ref 0–0.61)
EOSINOPHIL NFR BLD AUTO: 0 % (ref 0–6)
ERYTHROCYTE [DISTWIDTH] IN BLOOD BY AUTOMATED COUNT: 17.7 % (ref 11.6–15.1)
GFR SERPL CREATININE-BSD FRML MDRD: 89 ML/MIN/1.73SQ M
GLUCOSE SERPL-MCNC: 91 MG/DL (ref 65–140)
HCT VFR BLD AUTO: 42.1 % (ref 34.8–46.1)
HGB BLD-MCNC: 12.9 G/DL (ref 11.5–15.4)
IMM GRANULOCYTES # BLD AUTO: 0.03 THOUSAND/UL (ref 0–0.2)
IMM GRANULOCYTES NFR BLD AUTO: 0 % (ref 0–2)
LYMPHOCYTES # BLD AUTO: 1.57 THOUSANDS/ÂΜL (ref 0.6–4.47)
LYMPHOCYTES NFR BLD AUTO: 16 % (ref 14–44)
MCH RBC QN AUTO: 25.9 PG (ref 26.8–34.3)
MCHC RBC AUTO-ENTMCNC: 30.6 G/DL (ref 31.4–37.4)
MCV RBC AUTO: 85 FL (ref 82–98)
MONOCYTES # BLD AUTO: 0.54 THOUSAND/ÂΜL (ref 0.17–1.22)
MONOCYTES NFR BLD AUTO: 5 % (ref 4–12)
NEUTROPHILS # BLD AUTO: 7.93 THOUSANDS/ÂΜL (ref 1.85–7.62)
NEUTS SEG NFR BLD AUTO: 79 % (ref 43–75)
NRBC BLD AUTO-RTO: 0 /100 WBCS
PLATELET # BLD AUTO: 238 THOUSANDS/UL (ref 149–390)
PMV BLD AUTO: 10.3 FL (ref 8.9–12.7)
POTASSIUM SERPL-SCNC: 3.6 MMOL/L (ref 3.5–5.3)
RBC # BLD AUTO: 4.98 MILLION/UL (ref 3.81–5.12)
RH BLD: POSITIVE
SODIUM SERPL-SCNC: 139 MMOL/L (ref 135–147)
SPECIMEN EXPIRATION DATE: NORMAL
WBC # BLD AUTO: 10.14 THOUSAND/UL (ref 4.31–10.16)

## 2024-12-07 PROCEDURE — 86850 RBC ANTIBODY SCREEN: CPT | Performed by: STUDENT IN AN ORGANIZED HEALTH CARE EDUCATION/TRAINING PROGRAM

## 2024-12-07 PROCEDURE — 36415 COLL VENOUS BLD VENIPUNCTURE: CPT | Performed by: STUDENT IN AN ORGANIZED HEALTH CARE EDUCATION/TRAINING PROGRAM

## 2024-12-07 PROCEDURE — 86901 BLOOD TYPING SEROLOGIC RH(D): CPT | Performed by: STUDENT IN AN ORGANIZED HEALTH CARE EDUCATION/TRAINING PROGRAM

## 2024-12-07 PROCEDURE — 80048 BASIC METABOLIC PNL TOTAL CA: CPT | Performed by: STUDENT IN AN ORGANIZED HEALTH CARE EDUCATION/TRAINING PROGRAM

## 2024-12-07 PROCEDURE — 85025 COMPLETE CBC W/AUTO DIFF WBC: CPT | Performed by: STUDENT IN AN ORGANIZED HEALTH CARE EDUCATION/TRAINING PROGRAM

## 2024-12-07 PROCEDURE — 29125 APPL SHORT ARM SPLINT STATIC: CPT | Performed by: STUDENT IN AN ORGANIZED HEALTH CARE EDUCATION/TRAINING PROGRAM

## 2024-12-07 PROCEDURE — 74177 CT ABD & PELVIS W/CONTRAST: CPT

## 2024-12-07 PROCEDURE — 99284 EMERGENCY DEPT VISIT MOD MDM: CPT

## 2024-12-07 PROCEDURE — 73130 X-RAY EXAM OF HAND: CPT

## 2024-12-07 PROCEDURE — 99285 EMERGENCY DEPT VISIT HI MDM: CPT | Performed by: STUDENT IN AN ORGANIZED HEALTH CARE EDUCATION/TRAINING PROGRAM

## 2024-12-07 PROCEDURE — 71045 X-RAY EXAM CHEST 1 VIEW: CPT

## 2024-12-07 PROCEDURE — 86900 BLOOD TYPING SEROLOGIC ABO: CPT | Performed by: STUDENT IN AN ORGANIZED HEALTH CARE EDUCATION/TRAINING PROGRAM

## 2024-12-07 PROCEDURE — 73090 X-RAY EXAM OF FOREARM: CPT

## 2024-12-07 RX ORDER — ACETAMINOPHEN 325 MG/1
650 TABLET ORAL ONCE
Status: COMPLETED | OUTPATIENT
Start: 2024-12-07 | End: 2024-12-07

## 2024-12-07 RX ORDER — GINSENG 100 MG
1 CAPSULE ORAL ONCE
Status: COMPLETED | OUTPATIENT
Start: 2024-12-07 | End: 2024-12-07

## 2024-12-07 RX ADMIN — IOHEXOL 100 ML: 350 INJECTION, SOLUTION INTRAVENOUS at 14:57

## 2024-12-07 RX ADMIN — BACITRACIN ZINC 1 SMALL APPLICATION: 500 OINTMENT TOPICAL at 14:58

## 2024-12-07 RX ADMIN — ACETAMINOPHEN 650 MG: 325 TABLET, FILM COATED ORAL at 14:13

## 2024-12-07 NOTE — DISCHARGE INSTRUCTIONS
Follow-up with the orthopedic surgeon for your hand fracture.  Keep the splint in place until you are seen by them.  Return here for any new or worsening symptoms.  You can use Tylenol and ibuprofen as needed for pain control.  Increase your fluid intake.
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
## 2025-02-13 ENCOUNTER — OFFICE VISIT (OUTPATIENT)
Dept: ENDOCRINOLOGY | Facility: CLINIC | Age: 35
End: 2025-02-13
Payer: COMMERCIAL

## 2025-02-13 VITALS
WEIGHT: 187 LBS | SYSTOLIC BLOOD PRESSURE: 122 MMHG | DIASTOLIC BLOOD PRESSURE: 80 MMHG | BODY MASS INDEX: 27.62 KG/M2 | HEART RATE: 83 BPM

## 2025-02-13 DIAGNOSIS — E04.2 MULTIPLE THYROID NODULES: Primary | ICD-10-CM

## 2025-02-13 DIAGNOSIS — R42 DIZZINESS: ICD-10-CM

## 2025-02-13 DIAGNOSIS — R79.89 LOW TSH LEVEL: ICD-10-CM

## 2025-02-13 DIAGNOSIS — D64.9 ANEMIA, UNSPECIFIED TYPE: ICD-10-CM

## 2025-02-13 PROCEDURE — 99214 OFFICE O/P EST MOD 30 MIN: CPT | Performed by: INTERNAL MEDICINE

## 2025-02-13 RX ORDER — ESCITALOPRAM OXALATE 5 MG/1
5 TABLET ORAL DAILY
COMMUNITY

## 2025-02-13 NOTE — PROGRESS NOTES
Lorraine Klein 34 y.o. female MRN: [id number]    Encounter: [id number]      Assessment & Plan     Problem List Items Addressed This Visit          Endocrine    Multiple thyroid nodules - Primary    Right-sided thyroid nodule slightly increased in size-discussed with the patient that given the size she may need surgical intervention at some point in time.  She is interested in learning more about RFA  for management of thyroid nodule-discussed with her that this is not something that we do at [facility]   I have asked after this visit and it seems Dr. Nhi Lau who is an ENT at [place] is doing RFA and she may consider a consultation with her            Blood    Anemia    History of iron deficiency anemia-will repeat labs-some of her symptoms may be related to iron deficiency         Relevant Orders    CBC and differential (Completed)    Iron Panel (Includes Ferritin, Iron Sat%, Iron, and TIBC) (Completed)       Other    Dizziness    Relevant Orders    CBC and differential (Completed)    Iron Panel (Includes Ferritin, Iron Sat%, Iron, and TIBC) (Completed)    Low TSH level    Repeat thyroid function tests         Relevant Orders    T4, free (Completed)        CC:   Thyroid nodule    History of Present Illness     HPI:  She has history of thyroid nodules and underwent fine-needle aspiration biopsy of the right-sided thyroid nodule in  which was negative for malignancy-thyroid nodule has remained  fairly stable on serial imaging   No obstructive symptoms   No difficulty breathing   No voice changes     4 months post partum  - gained 15 lbs and lost 35 lbs since she delivered   No palpitations   Some dizziness     Breastfeeding     Diarrhea depending on what she eats         Review of Systems    Historical Information   Past Medical History:   Diagnosis Date    Allergic     Anxiety     Kidney stone     Nephrolithiasis 07/10/2018     Past Surgical History:   Procedure Laterality Date      SECTION  4/3/2015    NE  DELIVERY ONLY N/A 10/8/2024    Procedure:  SECTION () REPEAT;  Surgeon: Eneida Ponce DO;  Location: [facility] ;  Service: Obstetrics    NE CYSTO/URETERO W/LITHOTRIPSY &INDWELL STENT INSRT Right 2018    Procedure: CYSTOSCOPY URETEROSCOPY; RETROGRADE PYELOGRAM AND INSERTION STENT URETERAL;  Surgeon: Gerry Guillen MD;  Location: [facility];  Service: Urology    US GUIDED THYROID BIOPSY  2021     Social History   Social History     Substance and Sexual Activity   Alcohol Use Not Currently    Comment: socially     Social History     Substance and Sexual Activity   Drug Use No     Social History     Tobacco Use   Smoking Status Never   Smokeless Tobacco Never     Family History:   Family History   Problem Relation Age of Onset    No Known Problems Mother     Nephrolithiasis Father     Nephrolithiasis Sister     Nephrolithiasis Paternal Aunt     Nephrolithiasis Paternal Uncle     Diabetes Maternal Grandmother     Prostate cancer Maternal Grandfather     Diabetes Paternal Grandmother     Diabetes unspecified Paternal Grandmother     Thyroid disease unspecified Paternal Grandfather     Diabetes Paternal Grandfather        Meds/Allergies   Current Outpatient Medications   Medication Sig Dispense Refill    cetirizine (ZyrTEC) 5 MG chewable tablet Chew 5 mg as needed for allergies      escitalopram (LEXAPRO) 5 mg tablet Take 5 mg by mouth daily      Prenatal MV-Min-Fe Fum-FA-DHA (PRENATAL 1 PO) Take by mouth       No current facility-administered medications for this visit.     Allergies   Allergen Reactions    Pollen Extract        Objective   Vitals: Blood pressure 122/80, pulse 83, weight 84.8 kg (187 lb), currently breastfeeding.    Physical Exam  Vitals reviewed.   Constitutional:       General: She is not in acute distress.     Appearance: Normal appearance. She is obese. She is not ill-appearing, toxic-appearing or diaphoretic.   HENT:      Head:  Normocephalic and atraumatic.   Eyes:      General: No scleral icterus.     Extraocular Movements: Extraocular movements intact.   Neck:      Comments: Right lower pole nodule  Cardiovascular:      Rate and Rhythm: Normal rate and regular rhythm.      Heart sounds: Normal heart sounds. No murmur heard.  Pulmonary:      Effort: Pulmonary effort is normal. No respiratory distress.      Breath sounds: Normal breath sounds. No wheezing or rales.   Abdominal:      General: There is no distension.      Palpations: Abdomen is soft.      Tenderness: There is no abdominal tenderness.   Musculoskeletal:      Cervical back: Neck supple.      Right lower leg: No edema.      Left lower leg: No edema.   Lymphadenopathy:      Cervical: No cervical adenopathy.   Skin:     General: Skin is warm and dry.   Neurological:      General: No focal deficit present.      Mental Status: She is alert and oriented to person, place, and time.      Gait: Gait normal.   Psychiatric:         Mood and Affect: Mood normal.         Behavior: Behavior normal.         Thought Content: Thought content normal.         Judgment: Judgment normal.         The history was obtained from the review of the chart, patient.    Lab Results:   Lab Results   Component Value Date/Time    TSH 3RD GENERATON 0.710 02/14/2025 08:58 AM    FREE T4 0.88 03/13/2024 10:03 AM    Free T4 0.84 02/14/2025 08:58 AM    Free t4 0.71 11/30/2024 09:30 AM    Free t4 0.47 (L) 08/13/2024 09:02 AM    Free t4 0.58 (L) 07/15/2024 09:12 AM       Imaging Studies:   Results for orders placed during the hospital encounter of 03/18/21    US thyroid    Impression  The following meet current ACR criteria for recommending ultrasound guided biopsy:      The 2.6 cm right lower pole nodule. (Image number 14) (CRITERIA: TR 3, Mildly suspicious. FNA if >2.5 cm.    This nodule correlates to the area of palpable concern and is hypervascular.    Reference: ACR Thyroid Imaging, Reporting and Data System  "(TI-RADS): White Paper of the ACR TI-RADS Committee. J AM Renetta Radiol 2017;14:587-595. (additional recommendations based on American Thyroid Association 2015 guidelines.)      Workstation performed: [id number]    History: Thyroid nodules    Ultrasound of the thyroid gland was performed    Comparison Studies: 12/14/2023    Findings:    Right thyroid lobe: 6.2 x 2 x 3.1 cm  Left thyroid lobe: 4.8 x 1.2 x 1.9 cm  Thyroid gland echotexture: Homogenous  Thyroid gland vascularity on color Doppler: Normal    Nodules Right Lobe:    Nodule 1  Right mid to lower nodule measuring 3.3 x 2 x 2.7 cm  Comparison: Previously 2.8 x 1.8 x 2.4 cm  Composition: Solid  Echotexture: Heterogeneously isoechoic, with hypoechoic areas anteriorly and few  scattered small cystic areas  Margin: Regular  Echogenic foci: None  DIANE Category: Low    Nodules Left Lobe:    Nodule 1  Left lower nodule measuring 0.5 x 0.2 x 0.6 cm  Comparison: Increased from 0.3 x 0.1 x 0.3 cm on the prior exam  Composition: Partially cystic with eccentric solid area  Echotexture: Hypoechoic  Margin: Regular  Echogenic foci: None  DIANE Category: Low    Nodule 2  Left lower nodule measuring 0.8 x 0.5 x 0.5 cm  Comparison: Previously measuring 1 x 0.6 x 0.5 cm  Composition: Solid  Echotexture: Hypoechoic, considerably less apparent than on the prior exam  Margin: Ill-defined  Echogenic foci: None  DIANE Category: High given ill-defined margins      Nodules Isthmus:    None    Other findings: None  Exam End: 11/13/24  9:30 AM    Specimen Collected: 11/13/24 11:03 AM      Results Review Statement: I reviewed radiology reports from this admission including: Ultrasound(s).    Portions of the record may have been created with voice recognition software. Occasional wrong word or \"sound a like\" substitutions may have occurred due to the inherent limitations of voice recognition software. Read the chart carefully and recognize, using context, where substitutions have occurred.    "

## 2025-02-14 ENCOUNTER — APPOINTMENT (OUTPATIENT)
Dept: LAB | Facility: MEDICAL CENTER | Age: 35
End: 2025-02-14
Payer: COMMERCIAL

## 2025-02-14 DIAGNOSIS — R42 DIZZINESS: ICD-10-CM

## 2025-02-14 DIAGNOSIS — R79.89 LOW TSH LEVEL: ICD-10-CM

## 2025-02-14 DIAGNOSIS — D64.9 ANEMIA, UNSPECIFIED TYPE: ICD-10-CM

## 2025-02-14 LAB
BASOPHILS # BLD AUTO: 0.02 THOUSANDS/ΜL (ref 0–0.1)
BASOPHILS NFR BLD AUTO: 0 % (ref 0–1)
EOSINOPHIL # BLD AUTO: 0.05 THOUSAND/ΜL (ref 0–0.61)
EOSINOPHIL NFR BLD AUTO: 1 % (ref 0–6)
ERYTHROCYTE [DISTWIDTH] IN BLOOD BY AUTOMATED COUNT: 15.3 % (ref 11.6–15.1)
FERRITIN SERPL-MCNC: 16 NG/ML (ref 11–307)
HCT VFR BLD AUTO: 41.9 % (ref 34.8–46.1)
HGB BLD-MCNC: 12.8 G/DL (ref 11.5–15.4)
IMM GRANULOCYTES # BLD AUTO: 0.02 THOUSAND/UL (ref 0–0.2)
IMM GRANULOCYTES NFR BLD AUTO: 0 % (ref 0–2)
IRON SATN MFR SERPL: 12 % (ref 15–50)
IRON SERPL-MCNC: 46 UG/DL (ref 50–212)
LYMPHOCYTES # BLD AUTO: 1.85 THOUSANDS/ΜL (ref 0.6–4.47)
LYMPHOCYTES NFR BLD AUTO: 37 % (ref 14–44)
MCH RBC QN AUTO: 26.8 PG (ref 26.8–34.3)
MCHC RBC AUTO-ENTMCNC: 30.5 G/DL (ref 31.4–37.4)
MCV RBC AUTO: 88 FL (ref 82–98)
MONOCYTES # BLD AUTO: 0.38 THOUSAND/ΜL (ref 0.17–1.22)
MONOCYTES NFR BLD AUTO: 8 % (ref 4–12)
NEUTROPHILS # BLD AUTO: 2.75 THOUSANDS/ΜL (ref 1.85–7.62)
NEUTS SEG NFR BLD AUTO: 54 % (ref 43–75)
NRBC BLD AUTO-RTO: 0 /100 WBCS
PLATELET # BLD AUTO: 239 THOUSANDS/UL (ref 149–390)
PMV BLD AUTO: 11.6 FL (ref 8.9–12.7)
RBC # BLD AUTO: 4.77 MILLION/UL (ref 3.81–5.12)
T4 FREE SERPL-MCNC: 0.84 NG/DL (ref 0.61–1.12)
TIBC SERPL-MCNC: 378 UG/DL (ref 250–450)
TRANSFERRIN SERPL-MCNC: 270 MG/DL (ref 203–362)
TSH SERPL DL<=0.05 MIU/L-ACNC: 0.71 UIU/ML (ref 0.45–4.5)
UIBC SERPL-MCNC: 332 UG/DL (ref 155–355)
WBC # BLD AUTO: 5.07 THOUSAND/UL (ref 4.31–10.16)

## 2025-02-14 PROCEDURE — 84439 ASSAY OF FREE THYROXINE: CPT | Performed by: INTERNAL MEDICINE

## 2025-02-14 PROCEDURE — 83550 IRON BINDING TEST: CPT

## 2025-02-14 PROCEDURE — 83540 ASSAY OF IRON: CPT

## 2025-02-14 PROCEDURE — 85025 COMPLETE CBC W/AUTO DIFF WBC: CPT | Performed by: INTERNAL MEDICINE

## 2025-02-14 PROCEDURE — 82728 ASSAY OF FERRITIN: CPT

## 2025-02-18 ENCOUNTER — RESULTS FOLLOW-UP (OUTPATIENT)
Dept: ENDOCRINOLOGY | Facility: CLINIC | Age: 35
End: 2025-02-18

## 2025-02-19 ENCOUNTER — RESULTS FOLLOW-UP (OUTPATIENT)
Dept: LAB | Facility: MEDICAL CENTER | Age: 35
End: 2025-02-19

## 2025-02-21 NOTE — ASSESSMENT & PLAN NOTE
Right-sided thyroid nodule slightly increased in size-discussed with the patient that given the size she may need surgical intervention at some point in time.  She is interested in learning more about RFA  for management of thyroid nodule-discussed with her that this is not something that we do at [facility]   I have asked after this visit and it seems Dr. Nhi Lau who is an ENT at [place] is doing RFA and she may consider a consultation with her

## 2025-02-21 NOTE — ASSESSMENT & PLAN NOTE
History of iron deficiency anemia-will repeat labs-some of her symptoms may be related to iron deficiency

## 2025-02-23 LAB — T4 FREE SERPL DIALY-MCNC: 1 NG/DL

## 2025-02-24 ENCOUNTER — RESULTS FOLLOW-UP (OUTPATIENT)
Dept: ENDOCRINOLOGY | Facility: CLINIC | Age: 35
End: 2025-02-24

## 2025-02-26 ENCOUNTER — OFFICE VISIT (OUTPATIENT)
Dept: FAMILY MEDICINE CLINIC | Facility: CLINIC | Age: 35
End: 2025-02-26
Payer: COMMERCIAL

## 2025-02-26 ENCOUNTER — TELEPHONE (OUTPATIENT)
Age: 35
End: 2025-02-26

## 2025-02-26 VITALS
HEIGHT: 69 IN | DIASTOLIC BLOOD PRESSURE: 72 MMHG | WEIGHT: 187.6 LBS | TEMPERATURE: 98.5 F | OXYGEN SATURATION: 98 % | SYSTOLIC BLOOD PRESSURE: 110 MMHG | BODY MASS INDEX: 27.78 KG/M2 | HEART RATE: 98 BPM

## 2025-02-26 DIAGNOSIS — E61.1 IRON DEFICIENCY: ICD-10-CM

## 2025-02-26 DIAGNOSIS — Z00.00 WELL ADULT EXAM: Primary | ICD-10-CM

## 2025-02-26 DIAGNOSIS — Z12.4 SCREENING FOR CERVICAL CANCER: ICD-10-CM

## 2025-02-26 DIAGNOSIS — J45.21 MILD INTERMITTENT REACTIVE AIRWAY DISEASE WITH ACUTE EXACERBATION: ICD-10-CM

## 2025-02-26 DIAGNOSIS — K59.00 CONSTIPATION, UNSPECIFIED CONSTIPATION TYPE: ICD-10-CM

## 2025-02-26 PROCEDURE — 99395 PREV VISIT EST AGE 18-39: CPT | Performed by: FAMILY MEDICINE

## 2025-02-26 NOTE — PROGRESS NOTES
"Name: Lorraine Klein      : 1990      MRN: [id number]  Encounter Provider: Gaurav Anderson DO  Encounter Date: 2025   Encounter department: [facility]  :  Assessment & Plan  Well adult exam  Anticipatory guidance provided.  Recommend regular follow-up with gynecologist.  Recommend follow-up with hematology as well.  Recommend good diet and regular exercise.       Screening for cervical cancer         Iron deficiency    Orders:    Ambulatory Referral to Hematology / Oncology; Future    Mild intermittent reactive airway disease with acute exacerbation         Constipation, unspecified constipation type    Orders:    Ambulatory Referral to Gastroenterology; Future           History of Present Illness   Patient is here today for well check.  Generally feeling well.  She is interested in following up with hematologist for history of mild iron deficiency anemia.  Last CBC shows anemia stable.      Review of Systems   Constitutional: Negative.    HENT: Negative.     Eyes: Negative.    Respiratory: Negative.     Cardiovascular: Negative.    Gastrointestinal: Negative.    Endocrine: Negative.    Genitourinary: Negative.    Musculoskeletal: Negative.    Skin: Negative.    Allergic/Immunologic: Negative.    Neurological: Negative.    Hematological: Negative.    Psychiatric/Behavioral: Negative.         Objective   /72 (BP Location: Left arm, Patient Position: Sitting, Cuff Size: Standard)   Pulse 98   Temp 98.5 °F (36.9 °C) (Tympanic)   Ht 5' 9\" (1.753 m)   Wt 85.1 kg (187 lb 9.6 oz)   SpO2 98%   BMI 27.70 kg/m²      Physical Exam  Constitutional:       General: She is not in acute distress.     Appearance: She is well-developed. She is not diaphoretic.   HENT:      Head: Normocephalic and atraumatic.      Right Ear: External ear normal.      Left Ear: External ear normal.      Nose: Nose normal.      Mouth/Throat:      Pharynx: Oropharynx is clear.   Eyes:      General: " No scleral icterus.        Right eye: No discharge.         Left eye: No discharge.      Conjunctiva/sclera: Conjunctivae normal.      Pupils: Pupils are equal, round, and reactive to light.   Neck:      Thyroid: No thyromegaly.      Vascular: No JVD.      Trachea: No tracheal deviation.   Cardiovascular:      Rate and Rhythm: Normal rate and regular rhythm.      Heart sounds: Normal heart sounds. No murmur heard.     No friction rub. No gallop.   Pulmonary:      Effort: Pulmonary effort is normal. No respiratory distress.      Breath sounds: Normal breath sounds. No wheezing or rales.   Chest:      Chest wall: No tenderness.   Abdominal:      General: Bowel sounds are normal. There is no distension.      Palpations: Abdomen is soft. There is no mass.      Tenderness: There is no abdominal tenderness. There is no guarding or rebound.      Hernia: No hernia is present.   Musculoskeletal:         General: No tenderness or deformity. Normal range of motion.      Cervical back: Normal range of motion and neck supple.   Lymphadenopathy:      Cervical: No cervical adenopathy.   Skin:     General: Skin is warm and dry.      Capillary Refill: Capillary refill takes less than 2 seconds.      Coloration: Skin is not pale.      Findings: No erythema or rash.   Neurological:      Mental Status: She is alert and oriented to person, place, and time.      Cranial Nerves: No cranial nerve deficit.      Sensory: No sensory deficit.      Motor: No abnormal muscle tone.      Coordination: Coordination normal.      Deep Tendon Reflexes: Reflexes normal.   Psychiatric:         Mood and Affect: Mood normal.         Behavior: Behavior normal.

## 2025-02-26 NOTE — TELEPHONE ENCOUNTER
Patient states she called for her iron infusion. The soonest they could see her for just the consult would be April 7th. She is asking if there is anything they her PCP can do to help her get in sooner. [facility] offices were scheduling out into April.

## 2025-02-26 NOTE — TELEPHONE ENCOUNTER
I know the specialty groups have started doing a wait list for cancellations from prior patient's.  She could consider getting onto their cancellation list.

## 2025-05-30 ENCOUNTER — CONSULT (OUTPATIENT)
Dept: GASTROENTEROLOGY | Facility: CLINIC | Age: 35
End: 2025-05-30
Payer: COMMERCIAL

## 2025-05-30 VITALS
SYSTOLIC BLOOD PRESSURE: 118 MMHG | HEIGHT: 69 IN | BODY MASS INDEX: 28.44 KG/M2 | DIASTOLIC BLOOD PRESSURE: 76 MMHG | TEMPERATURE: 99.5 F | WEIGHT: 192 LBS

## 2025-05-30 DIAGNOSIS — K62.5 RECTAL BLEEDING: Primary | ICD-10-CM

## 2025-05-30 DIAGNOSIS — K58.1 IRRITABLE BOWEL SYNDROME WITH CONSTIPATION: ICD-10-CM

## 2025-05-30 PROCEDURE — 99204 OFFICE O/P NEW MOD 45 MIN: CPT | Performed by: INTERNAL MEDICINE

## 2025-05-30 RX ORDER — SODIUM CHLORIDE, SODIUM LACTATE, POTASSIUM CHLORIDE, CALCIUM CHLORIDE 600; 310; 30; 20 MG/100ML; MG/100ML; MG/100ML; MG/100ML
125 INJECTION, SOLUTION INTRAVENOUS CONTINUOUS
OUTPATIENT
Start: 2025-05-30

## 2025-05-30 RX ORDER — DICYCLOMINE HCL 20 MG
20 TABLET ORAL 3 TIMES DAILY PRN
Qty: 30 TABLET | Refills: 3 | Status: SHIPPED | OUTPATIENT
Start: 2025-05-30

## 2025-05-30 NOTE — PROGRESS NOTES
Name: Lorraine Klein      : 1990      MRN: [id number]  Encounter Provider: Estrada Rock MD  Encounter Date: 2025   Encounter department: [facility] [place]  :  Assessment & Plan  Rectal bleeding    Orders:    Colonoscopy; Future    Irritable bowel syndrome with constipation    Orders:    Celiac Disease Comprehensive Panel; Future    dicyclomine (BENTYL) 20 mg tablet; Take 1 tablet (20 mg total) by mouth 3 (three) times a day as needed (abdominal pain)      Assessment & Plan  1.  Irritable bowel syndrome with constipation  2.  Intermittent rectal bleeding from hemorrhoids  - Increase dietary fiber intake to 20-25 g daily.  - Provided information on a high-fiber diet.  - Recommended Benefiber and MiraLAX.  - Prescribed Bentyl for abdominal spasms, to be taken as needed.  - Scheduled colonoscopy for 2025.  - Sent prescription for GoLYTELY bowel prep to pharmacy.  - Ordered blood test for celiac disease.    2. Thyroid nodule:  - Scheduled total thyroidectomy on 2025.    Follow-up: 2025.      History of Present Illness   Lorraine Klein is a 34 y.o. female who presents for evaluation of change in bowel habit and abdominal pain  History of Present Illness  The patient presents for evaluation of gastrointestinal symptoms.    She reports experiencing vomiting episodes, which she attributes to severe abdominal discomfort. These symptoms began approximately 5 to 6 months ago, following her  delivery in 10/2024. She experiences severe abdominal pain once a week, which is accompanied by diarrhea. She also reports frequent blood in her stool, which she believes is related to her hemorrhoids. She has a history of severe hemorrhoids, which have been present for an extended period. She recalls a previous treatment involving rubber band ligation of one of the hemorrhoids, which she found extremely painful. She reports bowel movements every other  day, which are typically large and result in a feeling of complete emptying. She experiences cramping and abdominal pain during these episodes, but they do not significantly impact her quality of life. She has attempted to identify potential food triggers for her symptoms but has not found any correlation. She occasionally experiences reflux or heartburn, but these symptoms are not consistent. She experienced similar symptoms during her pregnancy.    She is having her thyroid removed due to a 75% chance of it being cancerous. She has a golf ball-sized solid nodule in her thyroid that has been monitored for the past 4 years. Pregnancy made it grow slightly. Her last ultrasound in the fall showed growth from pregnancy but nothing alarming. She considered RFA ablation and met with Dr. Lemon, who advised another biopsy before scheduling surgery. The second biopsy came back indeterminate, and further genetic testing revealed a 75% chance of cancer, leading to the decision to remove the thyroid. Her thyroid level and TSH are normal.    PAST SURGICAL HISTORY:   delivery in 10/2024  Rubber band ligation of hemorrhoid    FAMILY HISTORY  - Mother: Inflammatory bowel disease (specific condition not recalled)  - Negative for genetic conditions    Review of Systems   Constitutional:  Negative for chills and fever.   HENT:  Negative for ear pain and sore throat.    Eyes:  Negative for pain and visual disturbance.   Respiratory:  Negative for cough and shortness of breath.    Cardiovascular:  Negative for chest pain and palpitations.   Gastrointestinal:  Negative for abdominal pain and vomiting.   Genitourinary:  Negative for dysuria and hematuria.   Musculoskeletal:  Negative for arthralgias and back pain.   Skin:  Negative for color change and rash.   Neurological:  Negative for seizures and syncope.   All other systems reviewed and are negative.   A complete review of systems is negative other than that noted above in  "the HPI.         Objective   /76   Temp 99.5 °F (37.5 °C) (Oral)   Ht 5' 9\" (1.753 m)   Wt 87.1 kg (192 lb)   BMI 28.35 kg/m²     Physical Exam  Vitals and nursing note reviewed.   Constitutional:       General: She is not in acute distress.     Appearance: She is well-developed.   HENT:      Head: Normocephalic and atraumatic.     Eyes:      Conjunctiva/sclera: Conjunctivae normal.       Cardiovascular:      Rate and Rhythm: Normal rate and regular rhythm.      Heart sounds: No murmur heard.  Pulmonary:      Effort: Pulmonary effort is normal. No respiratory distress.      Breath sounds: Normal breath sounds.   Abdominal:      Palpations: Abdomen is soft.      Tenderness: There is no abdominal tenderness.     Musculoskeletal:         General: No swelling.      Cervical back: Neck supple.     Skin:     General: Skin is warm and dry.      Capillary Refill: Capillary refill takes less than 2 seconds.     Neurological:      Mental Status: She is alert.     Psychiatric:         Mood and Affect: Mood normal.        Physical Exam  Abdomen: Bowel sounds are normal.    Results  Labs   - Thyroid level: Normal   - TSH: Normal  Lab Results: I personally reviewed relevant lab results.             "

## 2025-05-30 NOTE — PATIENT INSTRUCTIONS
Scheduled date of colonoscopy (as of today): 09/18/2025  Physician performing colonoscopy: Dr. Rock   Location of colonoscopy: [facility]  Bowel prep reviewed with patient: Golytely   Instructions reviewed with patient by: Dariana REYES   Clearances:  N/A

## 2025-06-09 DIAGNOSIS — E04.1 THYROID NODULE: Primary | ICD-10-CM

## 2025-06-09 RX ORDER — LEVOTHYROXINE SODIUM 150 UG/1
150 TABLET ORAL DAILY
Qty: 30 TABLET | Refills: 3 | Status: SHIPPED | OUTPATIENT
Start: 2025-06-09

## 2025-07-03 DIAGNOSIS — E04.2 MULTIPLE THYROID NODULES: Primary | ICD-10-CM

## 2025-07-03 RX ORDER — LEVOTHYROXINE SODIUM 137 UG/1
137 TABLET ORAL DAILY
Qty: 30 TABLET | Refills: 1 | Status: SHIPPED | OUTPATIENT
Start: 2025-07-03

## 2025-07-21 ENCOUNTER — OFFICE VISIT (OUTPATIENT)
Dept: FAMILY MEDICINE CLINIC | Facility: CLINIC | Age: 35
End: 2025-07-21
Payer: COMMERCIAL

## 2025-07-21 VITALS
DIASTOLIC BLOOD PRESSURE: 78 MMHG | HEIGHT: 69 IN | BODY MASS INDEX: 28.82 KG/M2 | WEIGHT: 194.6 LBS | HEART RATE: 97 BPM | SYSTOLIC BLOOD PRESSURE: 100 MMHG | TEMPERATURE: 98.1 F | OXYGEN SATURATION: 97 %

## 2025-07-21 DIAGNOSIS — J02.9 PHARYNGITIS, UNSPECIFIED ETIOLOGY: Primary | ICD-10-CM

## 2025-07-21 PROBLEM — O13.9 GESTATIONAL HYPERTENSION: Status: RESOLVED | Noted: 2024-10-10 | Resolved: 2025-07-21

## 2025-07-21 LAB — S PYO AG THROAT QL: NEGATIVE

## 2025-07-21 PROCEDURE — 87880 STREP A ASSAY W/OPTIC: CPT | Performed by: FAMILY MEDICINE

## 2025-07-21 PROCEDURE — 99213 OFFICE O/P EST LOW 20 MIN: CPT | Performed by: FAMILY MEDICINE

## 2025-07-21 RX ORDER — AMOXICILLIN 500 MG/1
500 CAPSULE ORAL EVERY 8 HOURS SCHEDULED
Qty: 15 CAPSULE | Refills: 0 | Status: SHIPPED | OUTPATIENT
Start: 2025-07-21 | End: 2025-07-21

## 2025-07-21 RX ORDER — AMOXICILLIN 500 MG/1
500 CAPSULE ORAL EVERY 8 HOURS SCHEDULED
Qty: 21 CAPSULE | Refills: 0 | Status: SHIPPED | OUTPATIENT
Start: 2025-07-21 | End: 2025-07-28

## 2025-07-21 NOTE — PROGRESS NOTES
Name: Lorraine Klein      : 1990      MRN: [id number]  Encounter Provider: Laura Mcdonough DO  Encounter Date: 2025   Encounter department: [facility]  :  Assessment & Plan  Pharyngitis, unspecified etiology    Patient with signs and symptoms consistent with persistent pharyngitis.  Rapid strep test is negative at today's visit.  Recommend treatment course on amoxicillin for 7 days given persistent symptoms.  Recommend follow-up in 1 to 2 weeks or sooner as needed should symptoms persist or worsen.    Orders:  •  POCT rapid ANTIGEN strepA  •  amoxicillin (AMOXIL) 500 mg capsule; Take 1 capsule (500 mg total) by mouth every 8 (eight) hours for 7 days           History of Present Illness   Earache   Associated symptoms include a sore throat. Pertinent negatives include no abdominal pain, coughing, diarrhea, ear discharge, headaches, neck pain or vomiting.   Sore Throat   This is a new problem. The current episode started in the past 7 days. The problem has been gradually worsening. The pain is worse on the right side. There has been no fever. The pain is at a severity of 5/10. The pain is moderate. Associated symptoms include ear pain. Pertinent negatives include no abdominal pain, congestion, coughing, diarrhea, drooling, ear discharge, headaches, hoarse voice, plugged ear sensation, neck pain, shortness of breath, stridor, swollen glands, trouble swallowing or vomiting.   Sinus Problem  Associated symptoms include ear pain and a sore throat. Pertinent negatives include no congestion, coughing, headaches, hoarse voice, neck pain, shortness of breath or swollen glands.     Review of Systems   HENT:  Positive for ear pain and sore throat. Negative for congestion, drooling, ear discharge, hoarse voice and trouble swallowing.    Respiratory:  Negative for cough, shortness of breath and stridor.    Gastrointestinal:  Negative for abdominal pain, diarrhea and vomiting.  "  Musculoskeletal:  Negative for neck pain.   Neurological:  Negative for headaches.       Objective   /78 (BP Location: Left arm, Patient Position: Sitting, Cuff Size: Large)   Pulse 97   Temp 98.1 °F (36.7 °C) (Tympanic)   Ht 5' 9\" (1.753 m)   Wt 88.3 kg (194 lb 9.6 oz)   SpO2 97%   Breastfeeding No   BMI 28.74 kg/m²      Physical Exam  Vitals reviewed.   Constitutional:       General: She is not in acute distress.     Appearance: She is well-developed.   HENT:      Head: Normocephalic and atraumatic.      Right Ear: Tympanic membrane, ear canal and external ear normal.      Left Ear: Tympanic membrane, ear canal and external ear normal.      Nose: Nose normal.      Mouth/Throat:      Mouth: Mucous membranes are moist.      Pharynx: Oropharynx is clear. Posterior oropharyngeal erythema present.     Eyes:      Conjunctiva/sclera: Conjunctivae normal.      Pupils: Pupils are equal, round, and reactive to light.       Cardiovascular:      Rate and Rhythm: Normal rate and regular rhythm.      Heart sounds: No murmur heard.  Pulmonary:      Effort: Pulmonary effort is normal. No respiratory distress.      Breath sounds: Normal breath sounds.   Abdominal:      Palpations: Abdomen is soft.      Tenderness: There is no abdominal tenderness.     Musculoskeletal:      Cervical back: Neck supple.      Right lower leg: No edema.      Left lower leg: No edema.   Lymphadenopathy:      Cervical: No cervical adenopathy.     Skin:     General: Skin is warm and dry.     Neurological:      General: No focal deficit present.      Mental Status: She is alert and oriented to person, place, and time.     Psychiatric:         Mood and Affect: Mood normal.         Behavior: Behavior normal.         "

## 2025-08-11 LAB
T4 FREE SERPL-MCNC: 1.31 NG/DL (ref 0.61–1.12)
TSH SERPL-ACNC: 0.37 UIU/ML (ref 0.45–5.33)

## 2025-08-20 ENCOUNTER — OFFICE VISIT (OUTPATIENT)
Dept: ENDOCRINOLOGY | Facility: CLINIC | Age: 35
End: 2025-08-20
Payer: COMMERCIAL

## 2025-08-20 VITALS
DIASTOLIC BLOOD PRESSURE: 80 MMHG | HEIGHT: 69 IN | SYSTOLIC BLOOD PRESSURE: 100 MMHG | OXYGEN SATURATION: 100 % | HEART RATE: 75 BPM | WEIGHT: 194 LBS | BODY MASS INDEX: 28.73 KG/M2

## 2025-08-20 DIAGNOSIS — E89.0 POSTSURGICAL HYPOTHYROIDISM: Primary | ICD-10-CM

## 2025-08-20 DIAGNOSIS — E04.2 MULTIPLE THYROID NODULES: ICD-10-CM

## 2025-08-20 PROCEDURE — 99214 OFFICE O/P EST MOD 30 MIN: CPT | Performed by: INTERNAL MEDICINE

## 2025-08-20 RX ORDER — LEVOTHYROXINE SODIUM 125 MCG
TABLET ORAL
Qty: 60 TABLET | Refills: 0 | Status: SHIPPED | COMMUNITY
Start: 2025-08-20

## (undated) DEVICE — SUT PLAIN 3-0 CT-1 27 IN 842H

## (undated) DEVICE — ABG MICROSTICKS SAFETY

## (undated) DEVICE — CATH URETERAL 5FR X 70 CM FLEX TIP POLYUR BARD

## (undated) DEVICE — GLOVE SRG BIOGEL 8

## (undated) DEVICE — GLOVE SRG BIOGEL ECLIPSE 7

## (undated) DEVICE — SUT VICRYL 0 CT-1 36 IN J946H

## (undated) DEVICE — PACK TUR

## (undated) DEVICE — SUT MONOCRYL 4-0 PS-2 27 IN Y426H

## (undated) DEVICE — SCD SEQUENTIAL COMPRESSION COMFORT SLEEVE MEDIUM KNEE LENGTH: Brand: KENDALL SCD

## (undated) DEVICE — SUT VICRYL 0 CTX 36 IN J978H

## (undated) DEVICE — BASKET STONE RTRVL 4 WIRE HELICAL

## (undated) DEVICE — STENT URETERAL 6FR 24CML INLAY OPTIMA: Type: IMPLANTABLE DEVICE | Status: NON-FUNCTIONAL

## (undated) DEVICE — SPECIMEN CONTAINER STERILE PEEL PACK

## (undated) DEVICE — URETERAL DUAL LUMEN CATH

## (undated) DEVICE — INVIEW CLEAR LEGGINGS: Brand: CONVERTORS

## (undated) DEVICE — CHLORAPREP HI-LITE 26ML ORANGE

## (undated) DEVICE — GLOVE INDICATOR PI UNDERGLOVE SZ 7 BLUE

## (undated) DEVICE — PACK C-SECTION PBDS

## (undated) DEVICE — GUIDEWIRE STRGHT TIP 0.035 IN  SOLO PLUS